# Patient Record
Sex: MALE | Race: ASIAN | NOT HISPANIC OR LATINO | Employment: UNEMPLOYED | URBAN - METROPOLITAN AREA
[De-identification: names, ages, dates, MRNs, and addresses within clinical notes are randomized per-mention and may not be internally consistent; named-entity substitution may affect disease eponyms.]

---

## 2017-03-25 ENCOUNTER — COMMUNICATION - HEALTHEAST (OUTPATIENT)
Dept: FAMILY MEDICINE | Facility: CLINIC | Age: 32
End: 2017-03-25

## 2017-03-25 DIAGNOSIS — E78.5 HYPERLIPIDEMIA: ICD-10-CM

## 2017-03-25 DIAGNOSIS — I10 ESSENTIAL HYPERTENSION WITH GOAL BLOOD PRESSURE LESS THAN 140/90: ICD-10-CM

## 2017-03-25 DIAGNOSIS — Z79.4 TYPE 2 DIABETES MELLITUS WITH HYPERGLYCEMIA, WITH LONG-TERM CURRENT USE OF INSULIN (H): ICD-10-CM

## 2017-03-25 DIAGNOSIS — G47.00 INSOMNIA: ICD-10-CM

## 2017-03-25 DIAGNOSIS — I63.9 CEREBROVASCULAR ACCIDENT (CVA), UNSPECIFIED MECHANISM (H): ICD-10-CM

## 2017-03-25 DIAGNOSIS — M54.16 LUMBAR RADICULOPATHY: ICD-10-CM

## 2017-03-25 DIAGNOSIS — E11.65 TYPE 2 DIABETES MELLITUS WITH HYPERGLYCEMIA, WITH LONG-TERM CURRENT USE OF INSULIN (H): ICD-10-CM

## 2017-03-25 DIAGNOSIS — F33.2 SEVERE EPISODE OF RECURRENT MAJOR DEPRESSIVE DISORDER (H): ICD-10-CM

## 2017-06-19 ENCOUNTER — OFFICE VISIT - HEALTHEAST (OUTPATIENT)
Dept: NURSING | Facility: CLINIC | Age: 32
End: 2017-06-19

## 2017-06-19 ENCOUNTER — OFFICE VISIT - HEALTHEAST (OUTPATIENT)
Dept: FAMILY MEDICINE | Facility: CLINIC | Age: 32
End: 2017-06-19

## 2017-06-19 ENCOUNTER — AMBULATORY - HEALTHEAST (OUTPATIENT)
Dept: FAMILY MEDICINE | Facility: CLINIC | Age: 32
End: 2017-06-19

## 2017-06-19 DIAGNOSIS — E11.65 TYPE 2 DIABETES MELLITUS WITH HYPERGLYCEMIA, WITH LONG-TERM CURRENT USE OF INSULIN (H): ICD-10-CM

## 2017-06-19 DIAGNOSIS — E78.5 HYPERLIPIDEMIA, UNSPECIFIED HYPERLIPIDEMIA TYPE: ICD-10-CM

## 2017-06-19 DIAGNOSIS — G47.00 INSOMNIA, UNSPECIFIED TYPE: ICD-10-CM

## 2017-06-19 DIAGNOSIS — R80.9 TYPE 2 DIABETES MELLITUS WITH MICROALBUMINURIA, WITH LONG-TERM CURRENT USE OF INSULIN (H): ICD-10-CM

## 2017-06-19 DIAGNOSIS — I10 ESSENTIAL HYPERTENSION WITH GOAL BLOOD PRESSURE LESS THAN 140/90: ICD-10-CM

## 2017-06-19 DIAGNOSIS — R80.9 TYPE 2 DIABETES MELLITUS WITH MICROALBUMINURIA (H): ICD-10-CM

## 2017-06-19 DIAGNOSIS — I67.89 ACUTE, BUT ILL-DEFINED, CEREBROVASCULAR DISEASE: ICD-10-CM

## 2017-06-19 DIAGNOSIS — R80.9 PROTEINURIA: ICD-10-CM

## 2017-06-19 DIAGNOSIS — E55.9 VITAMIN D DEFICIENCY: ICD-10-CM

## 2017-06-19 DIAGNOSIS — E11.29 TYPE 2 DIABETES MELLITUS WITH MICROALBUMINURIA, WITH LONG-TERM CURRENT USE OF INSULIN (H): ICD-10-CM

## 2017-06-19 DIAGNOSIS — F33.2 SEVERE EPISODE OF RECURRENT MAJOR DEPRESSIVE DISORDER, WITHOUT PSYCHOTIC FEATURES (H): ICD-10-CM

## 2017-06-19 DIAGNOSIS — E78.5 HYPERLIPIDEMIA: ICD-10-CM

## 2017-06-19 DIAGNOSIS — Z79.4 TYPE 2 DIABETES MELLITUS WITH HYPERGLYCEMIA, WITH LONG-TERM CURRENT USE OF INSULIN (H): ICD-10-CM

## 2017-06-19 DIAGNOSIS — I61.9 STROKE DUE TO INTRACEREBRAL HEMORRHAGE (H): ICD-10-CM

## 2017-06-19 DIAGNOSIS — E78.1 HYPERTRIGLYCERIDEMIA: ICD-10-CM

## 2017-06-19 DIAGNOSIS — I63.9 CEREBROVASCULAR ACCIDENT (CVA), UNSPECIFIED MECHANISM (H): ICD-10-CM

## 2017-06-19 DIAGNOSIS — F33.2 SEVERE EPISODE OF RECURRENT MAJOR DEPRESSIVE DISORDER (H): ICD-10-CM

## 2017-06-19 DIAGNOSIS — Z79.4 TYPE 2 DIABETES MELLITUS WITH MICROALBUMINURIA, WITH LONG-TERM CURRENT USE OF INSULIN (H): ICD-10-CM

## 2017-06-19 DIAGNOSIS — E11.29 TYPE 2 DIABETES MELLITUS WITH MICROALBUMINURIA (H): ICD-10-CM

## 2017-06-19 DIAGNOSIS — B36.0 TINEA VERSICOLOR: ICD-10-CM

## 2017-06-19 DIAGNOSIS — E11.9 DIABETES (H): ICD-10-CM

## 2017-06-19 DIAGNOSIS — G81.90 HEMIPLEGIA (H): ICD-10-CM

## 2017-06-19 DIAGNOSIS — M54.16 LUMBAR RADICULOPATHY: ICD-10-CM

## 2017-06-19 LAB
CHOLEST SERPL-MCNC: 240 MG/DL
FASTING STATUS PATIENT QL REPORTED: NO
HBA1C MFR BLD: 10 % (ref 3.5–6)
HDLC SERPL-MCNC: 36 MG/DL
LDLC SERPL CALC-MCNC: 127 MG/DL
LDLC SERPL CALC-MCNC: ABNORMAL MG/DL
TRIGL SERPL-MCNC: 506 MG/DL

## 2017-06-19 ASSESSMENT — MIFFLIN-ST. JEOR: SCORE: 1495.08

## 2017-06-20 ENCOUNTER — COMMUNICATION - HEALTHEAST (OUTPATIENT)
Dept: FAMILY MEDICINE | Facility: CLINIC | Age: 32
End: 2017-06-20

## 2017-06-21 ENCOUNTER — COMMUNICATION - HEALTHEAST (OUTPATIENT)
Dept: FAMILY MEDICINE | Facility: CLINIC | Age: 32
End: 2017-06-21

## 2017-07-12 ENCOUNTER — OFFICE VISIT - HEALTHEAST (OUTPATIENT)
Dept: NURSING | Facility: CLINIC | Age: 32
End: 2017-07-12

## 2017-07-12 DIAGNOSIS — E78.5 HYPERLIPIDEMIA, UNSPECIFIED HYPERLIPIDEMIA TYPE: ICD-10-CM

## 2017-07-12 DIAGNOSIS — Z79.4 TYPE 2 DIABETES MELLITUS WITH HYPERGLYCEMIA, WITH LONG-TERM CURRENT USE OF INSULIN (H): ICD-10-CM

## 2017-07-12 DIAGNOSIS — I67.89 ACUTE, BUT ILL-DEFINED, CEREBROVASCULAR DISEASE: ICD-10-CM

## 2017-07-12 DIAGNOSIS — M54.2 NECK PAIN: ICD-10-CM

## 2017-07-12 DIAGNOSIS — F33.2 SEVERE EPISODE OF RECURRENT MAJOR DEPRESSIVE DISORDER, WITHOUT PSYCHOTIC FEATURES (H): ICD-10-CM

## 2017-07-12 DIAGNOSIS — E11.65 TYPE 2 DIABETES MELLITUS WITH HYPERGLYCEMIA, WITH LONG-TERM CURRENT USE OF INSULIN (H): ICD-10-CM

## 2017-07-26 ENCOUNTER — COMMUNICATION - HEALTHEAST (OUTPATIENT)
Dept: FAMILY MEDICINE | Facility: CLINIC | Age: 32
End: 2017-07-26

## 2017-07-26 ENCOUNTER — COMMUNICATION - HEALTHEAST (OUTPATIENT)
Dept: NURSING | Facility: CLINIC | Age: 32
End: 2017-07-26

## 2017-07-26 DIAGNOSIS — I10 ESSENTIAL HYPERTENSION WITH GOAL BLOOD PRESSURE LESS THAN 140/90: ICD-10-CM

## 2017-07-26 DIAGNOSIS — M54.16 LUMBAR RADICULOPATHY: ICD-10-CM

## 2017-07-26 DIAGNOSIS — I63.9 CEREBROVASCULAR ACCIDENT (CVA), UNSPECIFIED MECHANISM (H): ICD-10-CM

## 2017-07-31 ENCOUNTER — OFFICE VISIT - HEALTHEAST (OUTPATIENT)
Dept: NURSING | Facility: CLINIC | Age: 32
End: 2017-07-31

## 2017-07-31 DIAGNOSIS — F33.2 SEVERE EPISODE OF RECURRENT MAJOR DEPRESSIVE DISORDER, WITHOUT PSYCHOTIC FEATURES (H): ICD-10-CM

## 2017-07-31 DIAGNOSIS — E78.5 HYPERLIPIDEMIA, UNSPECIFIED HYPERLIPIDEMIA TYPE: ICD-10-CM

## 2017-07-31 DIAGNOSIS — F33.2 SEVERE EPISODE OF RECURRENT MAJOR DEPRESSIVE DISORDER (H): ICD-10-CM

## 2017-07-31 DIAGNOSIS — E11.65 TYPE 2 DIABETES MELLITUS WITH HYPERGLYCEMIA, WITH LONG-TERM CURRENT USE OF INSULIN (H): ICD-10-CM

## 2017-07-31 DIAGNOSIS — E55.9 VITAMIN D DEFICIENCY: ICD-10-CM

## 2017-07-31 DIAGNOSIS — E11.29 TYPE 2 DIABETES MELLITUS WITH MICROALBUMINURIA, WITH LONG-TERM CURRENT USE OF INSULIN (H): ICD-10-CM

## 2017-07-31 DIAGNOSIS — R80.9 TYPE 2 DIABETES MELLITUS WITH MICROALBUMINURIA, WITH LONG-TERM CURRENT USE OF INSULIN (H): ICD-10-CM

## 2017-07-31 DIAGNOSIS — Z79.4 TYPE 2 DIABETES MELLITUS WITH MICROALBUMINURIA, WITH LONG-TERM CURRENT USE OF INSULIN (H): ICD-10-CM

## 2017-07-31 DIAGNOSIS — Z79.4 TYPE 2 DIABETES MELLITUS WITH HYPERGLYCEMIA, WITH LONG-TERM CURRENT USE OF INSULIN (H): ICD-10-CM

## 2017-07-31 DIAGNOSIS — M54.16 LUMBAR RADICULOPATHY: ICD-10-CM

## 2017-08-09 ENCOUNTER — COMMUNICATION - HEALTHEAST (OUTPATIENT)
Dept: FAMILY MEDICINE | Facility: CLINIC | Age: 32
End: 2017-08-09

## 2017-08-11 ENCOUNTER — COMMUNICATION - HEALTHEAST (OUTPATIENT)
Dept: FAMILY MEDICINE | Facility: CLINIC | Age: 32
End: 2017-08-11

## 2017-08-13 ENCOUNTER — RECORDS - HEALTHEAST (OUTPATIENT)
Dept: ADMINISTRATIVE | Facility: OTHER | Age: 32
End: 2017-08-13

## 2017-08-28 ENCOUNTER — OFFICE VISIT - HEALTHEAST (OUTPATIENT)
Dept: NURSING | Facility: CLINIC | Age: 32
End: 2017-08-28

## 2017-08-28 DIAGNOSIS — G51.0 RIGHT-SIDED BELL'S PALSY: ICD-10-CM

## 2017-08-28 DIAGNOSIS — R80.9 TYPE 2 DIABETES MELLITUS WITH MICROALBUMINURIA, WITH LONG-TERM CURRENT USE OF INSULIN (H): ICD-10-CM

## 2017-08-28 DIAGNOSIS — Z79.4 TYPE 2 DIABETES MELLITUS WITH HYPERGLYCEMIA, WITH LONG-TERM CURRENT USE OF INSULIN (H): ICD-10-CM

## 2017-08-28 DIAGNOSIS — E11.29 TYPE 2 DIABETES MELLITUS WITH MICROALBUMINURIA (H): ICD-10-CM

## 2017-08-28 DIAGNOSIS — E11.29 TYPE 2 DIABETES MELLITUS WITH MICROALBUMINURIA, WITH LONG-TERM CURRENT USE OF INSULIN (H): ICD-10-CM

## 2017-08-28 DIAGNOSIS — E11.9 DIABETES (H): ICD-10-CM

## 2017-08-28 DIAGNOSIS — E11.65 TYPE 2 DIABETES MELLITUS WITH HYPERGLYCEMIA, WITH LONG-TERM CURRENT USE OF INSULIN (H): ICD-10-CM

## 2017-08-28 DIAGNOSIS — Z79.4 TYPE 2 DIABETES MELLITUS WITH MICROALBUMINURIA, WITH LONG-TERM CURRENT USE OF INSULIN (H): ICD-10-CM

## 2017-08-28 DIAGNOSIS — G51.0 BELL'S PALSY: ICD-10-CM

## 2017-08-28 DIAGNOSIS — R80.9 TYPE 2 DIABETES MELLITUS WITH MICROALBUMINURIA (H): ICD-10-CM

## 2017-08-30 ENCOUNTER — COMMUNICATION - HEALTHEAST (OUTPATIENT)
Dept: FAMILY MEDICINE | Facility: CLINIC | Age: 32
End: 2017-08-30

## 2017-08-31 ENCOUNTER — COMMUNICATION - HEALTHEAST (OUTPATIENT)
Dept: FAMILY MEDICINE | Facility: CLINIC | Age: 32
End: 2017-08-31

## 2017-08-31 ENCOUNTER — OFFICE VISIT - HEALTHEAST (OUTPATIENT)
Dept: FAMILY MEDICINE | Facility: CLINIC | Age: 32
End: 2017-08-31

## 2017-08-31 DIAGNOSIS — G51.0 RIGHT-SIDED BELL'S PALSY: ICD-10-CM

## 2017-08-31 ASSESSMENT — MIFFLIN-ST. JEOR: SCORE: 1502.79

## 2018-01-22 ENCOUNTER — OFFICE VISIT - HEALTHEAST (OUTPATIENT)
Dept: FAMILY MEDICINE | Facility: CLINIC | Age: 33
End: 2018-01-22

## 2018-01-22 ENCOUNTER — AMBULATORY - HEALTHEAST (OUTPATIENT)
Dept: FAMILY MEDICINE | Facility: CLINIC | Age: 33
End: 2018-01-22

## 2018-01-22 DIAGNOSIS — E55.9 VITAMIN D DEFICIENCY: ICD-10-CM

## 2018-01-22 DIAGNOSIS — Z79.4 TYPE 2 DIABETES MELLITUS WITH HYPERGLYCEMIA, WITH LONG-TERM CURRENT USE OF INSULIN (H): ICD-10-CM

## 2018-01-22 DIAGNOSIS — E11.65 TYPE 2 DIABETES MELLITUS WITH HYPERGLYCEMIA, WITH LONG-TERM CURRENT USE OF INSULIN (H): ICD-10-CM

## 2018-01-22 DIAGNOSIS — I65.21 STENOSIS OF RIGHT CAROTID ARTERY: ICD-10-CM

## 2018-01-22 DIAGNOSIS — I10 ESSENTIAL HYPERTENSION WITH GOAL BLOOD PRESSURE LESS THAN 140/90: ICD-10-CM

## 2018-01-22 DIAGNOSIS — E78.1 HYPERTRIGLYCERIDEMIA: ICD-10-CM

## 2018-01-22 DIAGNOSIS — E66.3 OVERWEIGHT: ICD-10-CM

## 2018-01-22 DIAGNOSIS — E78.5 HYPERLIPIDEMIA, UNSPECIFIED HYPERLIPIDEMIA TYPE: ICD-10-CM

## 2018-01-22 DIAGNOSIS — Z91.199 NON COMPLIANCE WITH MEDICAL TREATMENT: ICD-10-CM

## 2018-01-22 DIAGNOSIS — F33.2 SEVERE EPISODE OF RECURRENT MAJOR DEPRESSIVE DISORDER, WITHOUT PSYCHOTIC FEATURES (H): ICD-10-CM

## 2018-01-22 LAB
ALBUMIN UR-MCNC: NEGATIVE MG/DL
APPEARANCE UR: CLEAR
BASOPHILS # BLD AUTO: 0 THOU/UL (ref 0–0.2)
BASOPHILS NFR BLD AUTO: 1 % (ref 0–2)
BILIRUB UR QL STRIP: NEGATIVE
COLOR UR AUTO: YELLOW
EOSINOPHIL # BLD AUTO: 0.2 THOU/UL (ref 0–0.4)
EOSINOPHIL NFR BLD AUTO: 2 % (ref 0–6)
ERYTHROCYTE [DISTWIDTH] IN BLOOD BY AUTOMATED COUNT: 11.3 % (ref 11–14.5)
GLUCOSE UR STRIP-MCNC: ABNORMAL MG/DL
HBA1C MFR BLD: 9.2 % (ref 3.5–6)
HCT VFR BLD AUTO: 42.4 % (ref 40–54)
HGB BLD-MCNC: 14.4 G/DL (ref 14–18)
HGB UR QL STRIP: NEGATIVE
KETONES UR STRIP-MCNC: NEGATIVE MG/DL
LEUKOCYTE ESTERASE UR QL STRIP: NEGATIVE
LYMPHOCYTES # BLD AUTO: 2.2 THOU/UL (ref 0.8–4.4)
LYMPHOCYTES NFR BLD AUTO: 31 % (ref 20–40)
MCH RBC QN AUTO: 30.3 PG (ref 27–34)
MCHC RBC AUTO-ENTMCNC: 33.9 G/DL (ref 32–36)
MCV RBC AUTO: 89 FL (ref 80–100)
MONOCYTES # BLD AUTO: 0.4 THOU/UL (ref 0–0.9)
MONOCYTES NFR BLD AUTO: 6 % (ref 2–10)
NEUTROPHILS # BLD AUTO: 4.2 THOU/UL (ref 2–7.7)
NEUTROPHILS NFR BLD AUTO: 60 % (ref 50–70)
NITRATE UR QL: NEGATIVE
PH UR STRIP: 7 [PH] (ref 5–8)
PLATELET # BLD AUTO: 274 THOU/UL (ref 140–440)
PMV BLD AUTO: 6.8 FL (ref 7–10)
RBC # BLD AUTO: 4.74 MILL/UL (ref 4.4–6.2)
SP GR UR STRIP: 1.01 (ref 1–1.03)
UROBILINOGEN UR STRIP-ACNC: ABNORMAL
WBC: 7 THOU/UL (ref 4–11)

## 2018-01-22 ASSESSMENT — MIFFLIN-ST. JEOR: SCORE: 1484.65

## 2018-01-23 LAB
ALBUMIN SERPL-MCNC: 3.8 G/DL (ref 3.5–5)
ALP SERPL-CCNC: 72 U/L (ref 45–120)
ALT SERPL W P-5'-P-CCNC: 18 U/L (ref 0–45)
ANION GAP SERPL CALCULATED.3IONS-SCNC: 9 MMOL/L (ref 5–18)
AST SERPL W P-5'-P-CCNC: 10 U/L (ref 0–40)
BILIRUB SERPL-MCNC: 0.4 MG/DL (ref 0–1)
BUN SERPL-MCNC: 10 MG/DL (ref 8–22)
CALCIUM SERPL-MCNC: 9.3 MG/DL (ref 8.5–10.5)
CHLORIDE BLD-SCNC: 104 MMOL/L (ref 98–107)
CHOLEST SERPL-MCNC: 223 MG/DL
CO2 SERPL-SCNC: 30 MMOL/L (ref 22–31)
CREAT SERPL-MCNC: 0.89 MG/DL (ref 0.7–1.3)
CREAT UR-MCNC: 73.2 MG/DL
FASTING STATUS PATIENT QL REPORTED: NO
GFR SERPL CREATININE-BSD FRML MDRD: >60 ML/MIN/1.73M2
GLUCOSE BLD-MCNC: 292 MG/DL (ref 70–125)
HDLC SERPL-MCNC: 37 MG/DL
LDLC SERPL CALC-MCNC: 119 MG/DL
MICROALBUMIN UR-MCNC: 3.33 MG/DL (ref 0–1.99)
MICROALBUMIN/CREAT UR: 45.5 MG/G
POTASSIUM BLD-SCNC: 4 MMOL/L (ref 3.5–5)
PROT SERPL-MCNC: 7 G/DL (ref 6–8)
SODIUM SERPL-SCNC: 143 MMOL/L (ref 136–145)
TRIGL SERPL-MCNC: 336 MG/DL
TSH SERPL DL<=0.005 MIU/L-ACNC: 0.82 UIU/ML (ref 0.3–5)

## 2018-01-24 ENCOUNTER — COMMUNICATION - HEALTHEAST (OUTPATIENT)
Dept: FAMILY MEDICINE | Facility: CLINIC | Age: 33
End: 2018-01-24

## 2018-01-24 LAB — 25(OH)D3 SERPL-MCNC: 7.7 NG/ML (ref 30–80)

## 2018-01-25 ENCOUNTER — COMMUNICATION - HEALTHEAST (OUTPATIENT)
Dept: FAMILY MEDICINE | Facility: CLINIC | Age: 33
End: 2018-01-25

## 2018-02-08 ENCOUNTER — AMBULATORY - HEALTHEAST (OUTPATIENT)
Dept: NURSING | Facility: CLINIC | Age: 33
End: 2018-02-08

## 2018-02-08 DIAGNOSIS — Z71.89 COMPLEX CARE COORDINATION: ICD-10-CM

## 2018-02-12 ENCOUNTER — OFFICE VISIT - HEALTHEAST (OUTPATIENT)
Dept: NURSING | Facility: CLINIC | Age: 33
End: 2018-02-12

## 2018-02-12 ENCOUNTER — COMMUNICATION - HEALTHEAST (OUTPATIENT)
Dept: NURSING | Facility: CLINIC | Age: 33
End: 2018-02-12

## 2018-02-12 DIAGNOSIS — E55.9 VITAMIN D DEFICIENCY: ICD-10-CM

## 2018-02-12 DIAGNOSIS — Z79.4 TYPE 2 DIABETES MELLITUS WITH HYPERGLYCEMIA, WITH LONG-TERM CURRENT USE OF INSULIN (H): ICD-10-CM

## 2018-02-12 DIAGNOSIS — E11.65 TYPE 2 DIABETES MELLITUS WITH HYPERGLYCEMIA, WITH LONG-TERM CURRENT USE OF INSULIN (H): ICD-10-CM

## 2018-02-12 DIAGNOSIS — Z86.19 HISTORY OF HEPATITIS B: ICD-10-CM

## 2018-02-14 ENCOUNTER — RECORDS - HEALTHEAST (OUTPATIENT)
Dept: ADMINISTRATIVE | Facility: OTHER | Age: 33
End: 2018-02-14

## 2018-02-16 ENCOUNTER — RECORDS - HEALTHEAST (OUTPATIENT)
Dept: ADMINISTRATIVE | Facility: OTHER | Age: 33
End: 2018-02-16

## 2018-02-19 ENCOUNTER — COMMUNICATION - HEALTHEAST (OUTPATIENT)
Dept: NURSING | Facility: CLINIC | Age: 33
End: 2018-02-19

## 2018-03-02 ENCOUNTER — RECORDS - HEALTHEAST (OUTPATIENT)
Dept: ADMINISTRATIVE | Facility: OTHER | Age: 33
End: 2018-03-02

## 2018-03-13 ENCOUNTER — OFFICE VISIT - HEALTHEAST (OUTPATIENT)
Dept: NURSING | Facility: CLINIC | Age: 33
End: 2018-03-13

## 2018-03-13 DIAGNOSIS — E11.65 TYPE 2 DIABETES MELLITUS WITH HYPERGLYCEMIA, WITH LONG-TERM CURRENT USE OF INSULIN (H): ICD-10-CM

## 2018-03-13 DIAGNOSIS — Z79.4 TYPE 2 DIABETES MELLITUS WITH HYPERGLYCEMIA, WITH LONG-TERM CURRENT USE OF INSULIN (H): ICD-10-CM

## 2018-03-13 DIAGNOSIS — E55.9 VITAMIN D DEFICIENCY: ICD-10-CM

## 2018-04-24 ENCOUNTER — OFFICE VISIT - HEALTHEAST (OUTPATIENT)
Dept: PHARMACY | Facility: CLINIC | Age: 33
End: 2018-04-24

## 2018-04-24 ENCOUNTER — AMBULATORY - HEALTHEAST (OUTPATIENT)
Dept: LAB | Facility: CLINIC | Age: 33
End: 2018-04-24

## 2018-04-24 ENCOUNTER — AMBULATORY - HEALTHEAST (OUTPATIENT)
Dept: PHARMACY | Facility: CLINIC | Age: 33
End: 2018-04-24

## 2018-04-24 DIAGNOSIS — E11.65 TYPE 2 DIABETES MELLITUS WITH HYPERGLYCEMIA, WITH LONG-TERM CURRENT USE OF INSULIN (H): ICD-10-CM

## 2018-04-24 DIAGNOSIS — E78.1 HYPERTRIGLYCERIDEMIA: ICD-10-CM

## 2018-04-24 DIAGNOSIS — Z79.4 TYPE 2 DIABETES MELLITUS WITH HYPERGLYCEMIA, WITH LONG-TERM CURRENT USE OF INSULIN (H): ICD-10-CM

## 2018-04-24 DIAGNOSIS — E55.9 VITAMIN D DEFICIENCY DISEASE: ICD-10-CM

## 2018-04-24 DIAGNOSIS — F33.2 SEVERE EPISODE OF RECURRENT MAJOR DEPRESSIVE DISORDER, WITHOUT PSYCHOTIC FEATURES (H): ICD-10-CM

## 2018-04-24 DIAGNOSIS — E78.2 MIXED HYPERLIPIDEMIA: ICD-10-CM

## 2018-04-24 DIAGNOSIS — B19.10 HEPATITIS B: ICD-10-CM

## 2018-04-24 DIAGNOSIS — I61.9 STROKE DUE TO INTRACEREBRAL HEMORRHAGE (H): ICD-10-CM

## 2018-04-24 DIAGNOSIS — I10 ESSENTIAL HYPERTENSION WITH GOAL BLOOD PRESSURE LESS THAN 140/90: ICD-10-CM

## 2018-04-24 DIAGNOSIS — M54.16 LUMBAR RADICULOPATHY: ICD-10-CM

## 2018-04-24 LAB
ALBUMIN SERPL-MCNC: 4 G/DL (ref 3.5–5)
ALP SERPL-CCNC: 84 U/L (ref 45–120)
ALT SERPL W P-5'-P-CCNC: 20 U/L (ref 0–45)
ANION GAP SERPL CALCULATED.3IONS-SCNC: 12 MMOL/L (ref 5–18)
AST SERPL W P-5'-P-CCNC: 13 U/L (ref 0–40)
BILIRUB SERPL-MCNC: 1.1 MG/DL (ref 0–1)
BUN SERPL-MCNC: 12 MG/DL (ref 8–22)
CALCIUM SERPL-MCNC: 9.9 MG/DL (ref 8.5–10.5)
CHLORIDE BLD-SCNC: 103 MMOL/L (ref 98–107)
CHOLEST SERPL-MCNC: 261 MG/DL
CO2 SERPL-SCNC: 25 MMOL/L (ref 22–31)
CREAT SERPL-MCNC: 0.85 MG/DL (ref 0.7–1.3)
CREAT UR-MCNC: 378.4 MG/DL
FASTING STATUS PATIENT QL REPORTED: NO
GFR SERPL CREATININE-BSD FRML MDRD: >60 ML/MIN/1.73M2
GLUCOSE BLD-MCNC: 205 MG/DL (ref 70–125)
HBA1C MFR BLD: 10 % (ref 3.5–6)
HDLC SERPL-MCNC: 37 MG/DL
LDLC SERPL CALC-MCNC: 146 MG/DL
MICROALBUMIN UR-MCNC: 37.8 MG/DL (ref 0–1.99)
MICROALBUMIN/CREAT UR: 99.9 MG/G
POTASSIUM BLD-SCNC: 4.6 MMOL/L (ref 3.5–5)
PROT SERPL-MCNC: 7.6 G/DL (ref 6–8)
SODIUM SERPL-SCNC: 140 MMOL/L (ref 136–145)
TRIGL SERPL-MCNC: 389 MG/DL

## 2018-04-25 LAB
25(OH)D3 SERPL-MCNC: 17 NG/ML (ref 30–80)
HBV SURFACE AG SERPL QL IA: NEGATIVE
HEPATITIS B SURFACE ANTIBODY LHE- HISTORICAL: POSITIVE

## 2018-05-30 ENCOUNTER — OFFICE VISIT - HEALTHEAST (OUTPATIENT)
Dept: PHARMACY | Facility: CLINIC | Age: 33
End: 2018-05-30

## 2018-05-30 DIAGNOSIS — M54.16 LUMBAR RADICULOPATHY: ICD-10-CM

## 2018-05-30 DIAGNOSIS — Z79.4 TYPE 2 DIABETES MELLITUS WITH HYPERGLYCEMIA, WITH LONG-TERM CURRENT USE OF INSULIN (H): ICD-10-CM

## 2018-05-30 DIAGNOSIS — E55.9 VITAMIN D DEFICIENCY: ICD-10-CM

## 2018-05-30 DIAGNOSIS — E11.65 TYPE 2 DIABETES MELLITUS WITH HYPERGLYCEMIA, WITH LONG-TERM CURRENT USE OF INSULIN (H): ICD-10-CM

## 2018-05-30 DIAGNOSIS — I61.9 STROKE DUE TO INTRACEREBRAL HEMORRHAGE (H): ICD-10-CM

## 2018-05-30 DIAGNOSIS — F33.2 SEVERE EPISODE OF RECURRENT MAJOR DEPRESSIVE DISORDER, WITHOUT PSYCHOTIC FEATURES (H): ICD-10-CM

## 2018-05-30 DIAGNOSIS — B36.0 TINEA VERSICOLOR: ICD-10-CM

## 2018-06-27 ENCOUNTER — OFFICE VISIT - HEALTHEAST (OUTPATIENT)
Dept: FAMILY MEDICINE | Facility: CLINIC | Age: 33
End: 2018-06-27

## 2018-06-27 DIAGNOSIS — G81.90 HEMIPLEGIA (H): ICD-10-CM

## 2018-06-27 DIAGNOSIS — E11.65 TYPE 2 DIABETES MELLITUS WITH HYPERGLYCEMIA, WITH LONG-TERM CURRENT USE OF INSULIN (H): ICD-10-CM

## 2018-06-27 DIAGNOSIS — E78.5 HYPERLIPIDEMIA, UNSPECIFIED HYPERLIPIDEMIA TYPE: ICD-10-CM

## 2018-06-27 DIAGNOSIS — I10 ESSENTIAL HYPERTENSION WITH GOAL BLOOD PRESSURE LESS THAN 140/90: ICD-10-CM

## 2018-06-27 DIAGNOSIS — Z79.4 TYPE 2 DIABETES MELLITUS WITH HYPERGLYCEMIA, WITH LONG-TERM CURRENT USE OF INSULIN (H): ICD-10-CM

## 2018-06-27 ASSESSMENT — MIFFLIN-ST. JEOR: SCORE: 1466.5

## 2018-06-28 ENCOUNTER — COMMUNICATION - HEALTHEAST (OUTPATIENT)
Dept: FAMILY MEDICINE | Facility: CLINIC | Age: 33
End: 2018-06-28

## 2018-07-02 ENCOUNTER — OFFICE VISIT - HEALTHEAST (OUTPATIENT)
Dept: PHARMACY | Facility: CLINIC | Age: 33
End: 2018-07-02

## 2018-07-02 DIAGNOSIS — E11.65 TYPE 2 DIABETES MELLITUS WITH HYPERGLYCEMIA, WITH LONG-TERM CURRENT USE OF INSULIN (H): ICD-10-CM

## 2018-07-02 DIAGNOSIS — F33.2 SEVERE EPISODE OF RECURRENT MAJOR DEPRESSIVE DISORDER, WITHOUT PSYCHOTIC FEATURES (H): ICD-10-CM

## 2018-07-02 DIAGNOSIS — Z79.4 TYPE 2 DIABETES MELLITUS WITH HYPERGLYCEMIA, WITH LONG-TERM CURRENT USE OF INSULIN (H): ICD-10-CM

## 2018-08-02 ENCOUNTER — AMBULATORY - HEALTHEAST (OUTPATIENT)
Dept: PHARMACY | Facility: CLINIC | Age: 33
End: 2018-08-02

## 2018-08-02 DIAGNOSIS — Z79.4 TYPE 2 DIABETES MELLITUS WITH HYPERGLYCEMIA, WITH LONG-TERM CURRENT USE OF INSULIN (H): ICD-10-CM

## 2018-08-02 DIAGNOSIS — E11.65 TYPE 2 DIABETES MELLITUS WITH HYPERGLYCEMIA, WITH LONG-TERM CURRENT USE OF INSULIN (H): ICD-10-CM

## 2018-08-02 DIAGNOSIS — E55.9 VITAMIN D DEFICIENCY DISEASE: ICD-10-CM

## 2018-08-03 ENCOUNTER — AMBULATORY - HEALTHEAST (OUTPATIENT)
Dept: LAB | Facility: CLINIC | Age: 33
End: 2018-08-03

## 2018-08-03 ENCOUNTER — OFFICE VISIT - HEALTHEAST (OUTPATIENT)
Dept: PHARMACY | Facility: CLINIC | Age: 33
End: 2018-08-03

## 2018-08-03 DIAGNOSIS — Z79.4 TYPE 2 DIABETES MELLITUS WITH HYPERGLYCEMIA, WITH LONG-TERM CURRENT USE OF INSULIN (H): ICD-10-CM

## 2018-08-03 DIAGNOSIS — E55.9 VITAMIN D DEFICIENCY DISEASE: ICD-10-CM

## 2018-08-03 DIAGNOSIS — E11.65 TYPE 2 DIABETES MELLITUS WITH HYPERGLYCEMIA, WITH LONG-TERM CURRENT USE OF INSULIN (H): ICD-10-CM

## 2018-08-03 DIAGNOSIS — E78.1 HYPERTRIGLYCERIDEMIA: ICD-10-CM

## 2018-08-03 DIAGNOSIS — I10 ESSENTIAL HYPERTENSION WITH GOAL BLOOD PRESSURE LESS THAN 140/90: ICD-10-CM

## 2018-08-03 DIAGNOSIS — F33.2 SEVERE EPISODE OF RECURRENT MAJOR DEPRESSIVE DISORDER, WITHOUT PSYCHOTIC FEATURES (H): ICD-10-CM

## 2018-08-03 LAB
ALBUMIN SERPL-MCNC: 4.6 G/DL (ref 3.5–5)
ALP SERPL-CCNC: 86 U/L (ref 45–120)
ALT SERPL W P-5'-P-CCNC: 30 U/L (ref 0–45)
ANION GAP SERPL CALCULATED.3IONS-SCNC: 12 MMOL/L (ref 5–18)
AST SERPL W P-5'-P-CCNC: 15 U/L (ref 0–40)
BILIRUB SERPL-MCNC: 1.2 MG/DL (ref 0–1)
BUN SERPL-MCNC: 11 MG/DL (ref 8–22)
CALCIUM SERPL-MCNC: 10.3 MG/DL (ref 8.5–10.5)
CHLORIDE BLD-SCNC: 100 MMOL/L (ref 98–107)
CHOLEST SERPL-MCNC: 269 MG/DL
CO2 SERPL-SCNC: 27 MMOL/L (ref 22–31)
CREAT SERPL-MCNC: 0.83 MG/DL (ref 0.7–1.3)
CREAT UR-MCNC: 108 MG/DL
FASTING STATUS PATIENT QL REPORTED: NO
GFR SERPL CREATININE-BSD FRML MDRD: >60 ML/MIN/1.73M2
GLUCOSE BLD-MCNC: 152 MG/DL (ref 70–125)
HBA1C MFR BLD: 8 % (ref 3.5–6)
HDLC SERPL-MCNC: 42 MG/DL
LDLC SERPL CALC-MCNC: 155 MG/DL
LDLC SERPL CALC-MCNC: ABNORMAL MG/DL
MICROALBUMIN UR-MCNC: 79.94 MG/DL (ref 0–1.99)
MICROALBUMIN/CREAT UR: 740.2 MG/G
POTASSIUM BLD-SCNC: 4.7 MMOL/L (ref 3.5–5)
PROT SERPL-MCNC: 8.3 G/DL (ref 6–8)
SODIUM SERPL-SCNC: 139 MMOL/L (ref 136–145)
TRIGL SERPL-MCNC: 428 MG/DL

## 2018-08-06 LAB — 25(OH)D3 SERPL-MCNC: 21.2 NG/ML (ref 30–80)

## 2018-08-07 ENCOUNTER — AMBULATORY - HEALTHEAST (OUTPATIENT)
Dept: PHARMACY | Facility: CLINIC | Age: 33
End: 2018-08-07

## 2018-08-07 DIAGNOSIS — E55.9 VITAMIN D DEFICIENCY: ICD-10-CM

## 2018-09-15 ENCOUNTER — COMMUNICATION - HEALTHEAST (OUTPATIENT)
Dept: NURSING | Facility: CLINIC | Age: 33
End: 2018-09-15

## 2018-09-15 DIAGNOSIS — I10 ESSENTIAL HYPERTENSION WITH GOAL BLOOD PRESSURE LESS THAN 140/90: ICD-10-CM

## 2018-09-21 ENCOUNTER — OFFICE VISIT - HEALTHEAST (OUTPATIENT)
Dept: PHARMACY | Facility: CLINIC | Age: 33
End: 2018-09-21

## 2018-09-21 DIAGNOSIS — E55.9 VITAMIN D DEFICIENCY: ICD-10-CM

## 2018-09-21 DIAGNOSIS — E78.1 HYPERTRIGLYCERIDEMIA: ICD-10-CM

## 2018-09-21 DIAGNOSIS — E11.65 TYPE 2 DIABETES MELLITUS WITH HYPERGLYCEMIA, WITH LONG-TERM CURRENT USE OF INSULIN (H): ICD-10-CM

## 2018-09-21 DIAGNOSIS — I10 ESSENTIAL HYPERTENSION WITH GOAL BLOOD PRESSURE LESS THAN 140/90: ICD-10-CM

## 2018-09-21 DIAGNOSIS — F33.2 SEVERE EPISODE OF RECURRENT MAJOR DEPRESSIVE DISORDER, WITHOUT PSYCHOTIC FEATURES (H): ICD-10-CM

## 2018-09-21 DIAGNOSIS — Z79.4 TYPE 2 DIABETES MELLITUS WITH HYPERGLYCEMIA, WITH LONG-TERM CURRENT USE OF INSULIN (H): ICD-10-CM

## 2018-10-19 ENCOUNTER — OFFICE VISIT - HEALTHEAST (OUTPATIENT)
Dept: PHARMACY | Facility: CLINIC | Age: 33
End: 2018-10-19

## 2018-10-19 ENCOUNTER — AMBULATORY - HEALTHEAST (OUTPATIENT)
Dept: LAB | Facility: CLINIC | Age: 33
End: 2018-10-19

## 2018-10-19 DIAGNOSIS — Z79.4 TYPE 2 DIABETES MELLITUS WITH HYPERGLYCEMIA, WITH LONG-TERM CURRENT USE OF INSULIN (H): ICD-10-CM

## 2018-10-19 DIAGNOSIS — E11.65 TYPE 2 DIABETES MELLITUS WITH HYPERGLYCEMIA, WITH LONG-TERM CURRENT USE OF INSULIN (H): ICD-10-CM

## 2018-10-19 DIAGNOSIS — E55.9 VITAMIN D DEFICIENCY: ICD-10-CM

## 2018-10-19 DIAGNOSIS — E78.2 MIXED HYPERLIPIDEMIA: ICD-10-CM

## 2018-10-19 LAB
ANION GAP SERPL CALCULATED.3IONS-SCNC: 13 MMOL/L (ref 5–18)
BUN SERPL-MCNC: 13 MG/DL (ref 8–22)
CALCIUM SERPL-MCNC: 10.4 MG/DL (ref 8.5–10.5)
CHLORIDE BLD-SCNC: 102 MMOL/L (ref 98–107)
CO2 SERPL-SCNC: 27 MMOL/L (ref 22–31)
CREAT SERPL-MCNC: 0.91 MG/DL (ref 0.7–1.3)
GFR SERPL CREATININE-BSD FRML MDRD: >60 ML/MIN/1.73M2
GLUCOSE BLD-MCNC: 163 MG/DL (ref 70–125)
POTASSIUM BLD-SCNC: 4.4 MMOL/L (ref 3.5–5)
SODIUM SERPL-SCNC: 142 MMOL/L (ref 136–145)

## 2018-11-04 ENCOUNTER — COMMUNICATION - HEALTHEAST (OUTPATIENT)
Dept: FAMILY MEDICINE | Facility: CLINIC | Age: 33
End: 2018-11-04

## 2018-11-04 DIAGNOSIS — E11.65 TYPE 2 DIABETES MELLITUS WITH HYPERGLYCEMIA, WITH LONG-TERM CURRENT USE OF INSULIN (H): ICD-10-CM

## 2018-11-04 DIAGNOSIS — Z79.4 TYPE 2 DIABETES MELLITUS WITH HYPERGLYCEMIA, WITH LONG-TERM CURRENT USE OF INSULIN (H): ICD-10-CM

## 2018-11-27 ENCOUNTER — AMBULATORY - HEALTHEAST (OUTPATIENT)
Dept: PHARMACY | Facility: CLINIC | Age: 33
End: 2018-11-27

## 2018-11-27 DIAGNOSIS — E55.9 VITAMIN D DEFICIENCY DISEASE: ICD-10-CM

## 2018-11-27 DIAGNOSIS — Z79.4 TYPE 2 DIABETES MELLITUS WITH HYPERGLYCEMIA, WITH LONG-TERM CURRENT USE OF INSULIN (H): ICD-10-CM

## 2018-11-27 DIAGNOSIS — E78.1 HYPERTRIGLYCERIDEMIA: ICD-10-CM

## 2018-11-27 DIAGNOSIS — E11.65 TYPE 2 DIABETES MELLITUS WITH HYPERGLYCEMIA, WITH LONG-TERM CURRENT USE OF INSULIN (H): ICD-10-CM

## 2018-11-28 ENCOUNTER — AMBULATORY - HEALTHEAST (OUTPATIENT)
Dept: LAB | Facility: CLINIC | Age: 33
End: 2018-11-28

## 2018-11-28 ENCOUNTER — RECORDS - HEALTHEAST (OUTPATIENT)
Dept: ADMINISTRATIVE | Facility: OTHER | Age: 33
End: 2018-11-28

## 2018-11-28 ENCOUNTER — COMMUNICATION - HEALTHEAST (OUTPATIENT)
Dept: PHARMACY | Facility: CLINIC | Age: 33
End: 2018-11-28

## 2018-11-28 ENCOUNTER — OFFICE VISIT - HEALTHEAST (OUTPATIENT)
Dept: PHARMACY | Facility: CLINIC | Age: 33
End: 2018-11-28

## 2018-11-28 DIAGNOSIS — M54.16 LUMBAR RADICULOPATHY: ICD-10-CM

## 2018-11-28 DIAGNOSIS — E11.9 DIABETES (H): ICD-10-CM

## 2018-11-28 DIAGNOSIS — E11.65 TYPE 2 DIABETES MELLITUS WITH HYPERGLYCEMIA, WITH LONG-TERM CURRENT USE OF INSULIN (H): ICD-10-CM

## 2018-11-28 DIAGNOSIS — E55.9 VITAMIN D DEFICIENCY DISEASE: ICD-10-CM

## 2018-11-28 DIAGNOSIS — F33.2 SEVERE EPISODE OF RECURRENT MAJOR DEPRESSIVE DISORDER, WITHOUT PSYCHOTIC FEATURES (H): ICD-10-CM

## 2018-11-28 DIAGNOSIS — Z79.4 TYPE 2 DIABETES MELLITUS WITH HYPERGLYCEMIA, WITH LONG-TERM CURRENT USE OF INSULIN (H): ICD-10-CM

## 2018-11-28 DIAGNOSIS — I10 ESSENTIAL HYPERTENSION WITH GOAL BLOOD PRESSURE LESS THAN 140/90: ICD-10-CM

## 2018-11-28 LAB
ALBUMIN SERPL-MCNC: 4.5 G/DL (ref 3.5–5)
ALP SERPL-CCNC: 61 U/L (ref 45–120)
ALT SERPL W P-5'-P-CCNC: 27 U/L (ref 0–45)
ANION GAP SERPL CALCULATED.3IONS-SCNC: 9 MMOL/L (ref 5–18)
AST SERPL W P-5'-P-CCNC: 19 U/L (ref 0–40)
BILIRUB SERPL-MCNC: 0.5 MG/DL (ref 0–1)
BUN SERPL-MCNC: 18 MG/DL (ref 8–22)
CALCIUM SERPL-MCNC: 10.6 MG/DL (ref 8.5–10.5)
CHLORIDE BLD-SCNC: 102 MMOL/L (ref 98–107)
CO2 SERPL-SCNC: 30 MMOL/L (ref 22–31)
CREAT SERPL-MCNC: 0.91 MG/DL (ref 0.7–1.3)
GFR SERPL CREATININE-BSD FRML MDRD: >60 ML/MIN/1.73M2
GLUCOSE BLD-MCNC: 153 MG/DL (ref 70–125)
HBA1C MFR BLD: 9.1 % (ref 3.5–6)
POTASSIUM BLD-SCNC: 4.6 MMOL/L (ref 3.5–5)
PROT SERPL-MCNC: 7.9 G/DL (ref 6–8)
SODIUM SERPL-SCNC: 141 MMOL/L (ref 136–145)

## 2018-11-29 ENCOUNTER — COMMUNICATION - HEALTHEAST (OUTPATIENT)
Dept: FAMILY MEDICINE | Facility: CLINIC | Age: 33
End: 2018-11-29

## 2018-11-29 DIAGNOSIS — E55.9 VITAMIN D DEFICIENCY: ICD-10-CM

## 2018-11-29 LAB — 25(OH)D3 SERPL-MCNC: 19.4 NG/ML (ref 30–80)

## 2018-12-28 ENCOUNTER — OFFICE VISIT - HEALTHEAST (OUTPATIENT)
Dept: PHARMACY | Facility: CLINIC | Age: 33
End: 2018-12-28

## 2018-12-28 DIAGNOSIS — Z79.4 TYPE 2 DIABETES MELLITUS WITH HYPERGLYCEMIA, WITH LONG-TERM CURRENT USE OF INSULIN (H): ICD-10-CM

## 2018-12-28 DIAGNOSIS — E55.9 VITAMIN D DEFICIENCY: ICD-10-CM

## 2018-12-28 DIAGNOSIS — F33.2 SEVERE EPISODE OF RECURRENT MAJOR DEPRESSIVE DISORDER, WITHOUT PSYCHOTIC FEATURES (H): ICD-10-CM

## 2018-12-28 DIAGNOSIS — E78.2 MIXED HYPERLIPIDEMIA: ICD-10-CM

## 2018-12-28 DIAGNOSIS — E11.65 TYPE 2 DIABETES MELLITUS WITH HYPERGLYCEMIA, WITH LONG-TERM CURRENT USE OF INSULIN (H): ICD-10-CM

## 2018-12-28 DIAGNOSIS — M54.16 LUMBAR RADICULOPATHY: ICD-10-CM

## 2019-01-30 ENCOUNTER — COMMUNICATION - HEALTHEAST (OUTPATIENT)
Dept: FAMILY MEDICINE | Facility: CLINIC | Age: 34
End: 2019-01-30

## 2019-01-30 DIAGNOSIS — E78.1 HYPERTRIGLYCERIDEMIA: ICD-10-CM

## 2019-03-04 ENCOUNTER — AMBULATORY - HEALTHEAST (OUTPATIENT)
Dept: PHARMACY | Facility: CLINIC | Age: 34
End: 2019-03-04

## 2019-03-04 DIAGNOSIS — E55.9 VITAMIN D DEFICIENCY: ICD-10-CM

## 2019-03-04 DIAGNOSIS — Z79.4 TYPE 2 DIABETES MELLITUS WITH HYPERGLYCEMIA, WITH LONG-TERM CURRENT USE OF INSULIN (H): ICD-10-CM

## 2019-03-04 DIAGNOSIS — E11.65 TYPE 2 DIABETES MELLITUS WITH HYPERGLYCEMIA, WITH LONG-TERM CURRENT USE OF INSULIN (H): ICD-10-CM

## 2019-03-05 ENCOUNTER — AMBULATORY - HEALTHEAST (OUTPATIENT)
Dept: LAB | Facility: CLINIC | Age: 34
End: 2019-03-05

## 2019-03-05 ENCOUNTER — OFFICE VISIT - HEALTHEAST (OUTPATIENT)
Dept: PHARMACY | Facility: CLINIC | Age: 34
End: 2019-03-05

## 2019-03-05 DIAGNOSIS — E11.65 TYPE 2 DIABETES MELLITUS WITH HYPERGLYCEMIA, WITH LONG-TERM CURRENT USE OF INSULIN (H): ICD-10-CM

## 2019-03-05 DIAGNOSIS — E55.9 VITAMIN D DEFICIENCY: ICD-10-CM

## 2019-03-05 DIAGNOSIS — Z79.4 TYPE 2 DIABETES MELLITUS WITH HYPERGLYCEMIA, WITH LONG-TERM CURRENT USE OF INSULIN (H): ICD-10-CM

## 2019-03-05 DIAGNOSIS — M54.16 LUMBAR RADICULOPATHY: ICD-10-CM

## 2019-03-05 DIAGNOSIS — Z86.73 HISTORY OF ISCHEMIC STROKE WITHOUT RESIDUAL DEFICITS: ICD-10-CM

## 2019-03-05 DIAGNOSIS — F33.2 SEVERE EPISODE OF RECURRENT MAJOR DEPRESSIVE DISORDER, WITHOUT PSYCHOTIC FEATURES (H): ICD-10-CM

## 2019-03-05 DIAGNOSIS — E78.1 HYPERTRIGLYCERIDEMIA: ICD-10-CM

## 2019-03-05 LAB — HBA1C MFR BLD: 6.9 % (ref 3.5–6)

## 2019-03-06 ENCOUNTER — COMMUNICATION - HEALTHEAST (OUTPATIENT)
Dept: FAMILY MEDICINE | Facility: CLINIC | Age: 34
End: 2019-03-06

## 2019-03-06 LAB — 25(OH)D3 SERPL-MCNC: 33.9 NG/ML (ref 30–80)

## 2019-03-08 ENCOUNTER — COMMUNICATION - HEALTHEAST (OUTPATIENT)
Dept: FAMILY MEDICINE | Facility: CLINIC | Age: 34
End: 2019-03-08

## 2019-04-02 ENCOUNTER — COMMUNICATION - HEALTHEAST (OUTPATIENT)
Dept: FAMILY MEDICINE | Facility: CLINIC | Age: 34
End: 2019-04-02

## 2019-04-02 DIAGNOSIS — Z79.4 TYPE 2 DIABETES MELLITUS WITH HYPERGLYCEMIA, WITH LONG-TERM CURRENT USE OF INSULIN (H): ICD-10-CM

## 2019-04-02 DIAGNOSIS — E11.65 TYPE 2 DIABETES MELLITUS WITH HYPERGLYCEMIA, WITH LONG-TERM CURRENT USE OF INSULIN (H): ICD-10-CM

## 2019-06-03 ENCOUNTER — RECORDS - HEALTHEAST (OUTPATIENT)
Dept: HEALTH INFORMATION MANAGEMENT | Facility: CLINIC | Age: 34
End: 2019-06-03

## 2019-06-12 ENCOUNTER — COMMUNICATION - HEALTHEAST (OUTPATIENT)
Dept: FAMILY MEDICINE | Facility: CLINIC | Age: 34
End: 2019-06-12

## 2019-07-18 ENCOUNTER — COMMUNICATION - HEALTHEAST (OUTPATIENT)
Dept: FAMILY MEDICINE | Facility: CLINIC | Age: 34
End: 2019-07-18

## 2019-07-18 DIAGNOSIS — E11.65 TYPE 2 DIABETES MELLITUS WITH HYPERGLYCEMIA, WITH LONG-TERM CURRENT USE OF INSULIN (H): ICD-10-CM

## 2019-07-18 DIAGNOSIS — E78.1 HYPERTRIGLYCERIDEMIA: ICD-10-CM

## 2019-07-18 DIAGNOSIS — Z79.4 TYPE 2 DIABETES MELLITUS WITH HYPERGLYCEMIA, WITH LONG-TERM CURRENT USE OF INSULIN (H): ICD-10-CM

## 2019-07-29 ENCOUNTER — AMBULATORY - HEALTHEAST (OUTPATIENT)
Dept: FAMILY MEDICINE | Facility: CLINIC | Age: 34
End: 2019-07-29

## 2019-07-29 ENCOUNTER — OFFICE VISIT - HEALTHEAST (OUTPATIENT)
Dept: FAMILY MEDICINE | Facility: CLINIC | Age: 34
End: 2019-07-29

## 2019-07-29 ENCOUNTER — HOSPITAL ENCOUNTER (OUTPATIENT)
Dept: LAB | Age: 34
Setting detail: SPECIMEN
Discharge: HOME | End: 2019-07-29

## 2019-07-29 DIAGNOSIS — Z79.4 TYPE 2 DIABETES MELLITUS WITH HYPERGLYCEMIA, WITH LONG-TERM CURRENT USE OF INSULIN (H): ICD-10-CM

## 2019-07-29 DIAGNOSIS — M54.16 LUMBAR RADICULOPATHY: ICD-10-CM

## 2019-07-29 DIAGNOSIS — E11.65 TYPE 2 DIABETES MELLITUS WITH HYPERGLYCEMIA, WITH LONG-TERM CURRENT USE OF INSULIN (H): ICD-10-CM

## 2019-07-29 DIAGNOSIS — I10 ESSENTIAL HYPERTENSION WITH GOAL BLOOD PRESSURE LESS THAN 140/90: ICD-10-CM

## 2019-07-29 DIAGNOSIS — B36.0 TINEA VERSICOLOR: ICD-10-CM

## 2019-07-29 DIAGNOSIS — I69.352: ICD-10-CM

## 2019-07-29 DIAGNOSIS — I61.9 STROKE DUE TO INTRACEREBRAL HEMORRHAGE (H): ICD-10-CM

## 2019-07-29 DIAGNOSIS — E78.1 HYPERTRIGLYCERIDEMIA: ICD-10-CM

## 2019-07-29 DIAGNOSIS — E78.5 HYPERLIPIDEMIA, UNSPECIFIED HYPERLIPIDEMIA TYPE: ICD-10-CM

## 2019-07-29 DIAGNOSIS — E55.9 VITAMIN D DEFICIENCY: ICD-10-CM

## 2019-07-29 LAB
ALBUMIN SERPL-MCNC: 4.3 G/DL (ref 3.5–5)
ALP SERPL-CCNC: 65 U/L (ref 45–120)
ALT SERPL W P-5'-P-CCNC: 29 U/L (ref 0–45)
ANION GAP SERPL CALCULATED.3IONS-SCNC: 9 MMOL/L (ref 5–18)
AST SERPL W P-5'-P-CCNC: 17 U/L (ref 0–40)
BILIRUB SERPL-MCNC: 0.7 MG/DL (ref 0–1)
BUN SERPL-MCNC: 14 MG/DL (ref 8–22)
CALCIUM SERPL-MCNC: 10 MG/DL (ref 8.5–10.5)
CHLORIDE BLD-SCNC: 104 MMOL/L (ref 98–107)
CHOLEST SERPL-MCNC: 263 MG/DL
CO2 SERPL-SCNC: 27 MMOL/L (ref 22–31)
CREAT SERPL-MCNC: 0.84 MG/DL (ref 0.7–1.3)
FASTING STATUS PATIENT QL REPORTED: ABNORMAL
GFR SERPL CREATININE-BSD FRML MDRD: >60 ML/MIN/1.73M2
GLUCOSE BLD-MCNC: 205 MG/DL (ref 70–125)
HBA1C MFR BLD: 8.9 % (ref 3.5–6)
HDLC SERPL-MCNC: 36 MG/DL
LDLC SERPL CALC-MCNC: 142 MG/DL
LDLC SERPL CALC-MCNC: ABNORMAL MG/DL
POTASSIUM BLD-SCNC: 4.1 MMOL/L (ref 3.5–5)
PROT SERPL-MCNC: 7.3 G/DL (ref 6–8)
SODIUM SERPL-SCNC: 140 MMOL/L (ref 136–145)
TRIGL SERPL-MCNC: 461 MG/DL

## 2019-07-29 ASSESSMENT — MIFFLIN-ST. JEOR: SCORE: 1516.4

## 2019-08-05 ENCOUNTER — COMMUNICATION - HEALTHEAST (OUTPATIENT)
Dept: FAMILY MEDICINE | Facility: CLINIC | Age: 34
End: 2019-08-05

## 2019-08-29 ENCOUNTER — COMMUNICATION - HEALTHEAST (OUTPATIENT)
Dept: FAMILY MEDICINE | Facility: CLINIC | Age: 34
End: 2019-08-29

## 2019-08-29 DIAGNOSIS — E11.65 TYPE 2 DIABETES MELLITUS WITH HYPERGLYCEMIA, WITH LONG-TERM CURRENT USE OF INSULIN (H): ICD-10-CM

## 2019-08-29 DIAGNOSIS — Z79.4 TYPE 2 DIABETES MELLITUS WITH HYPERGLYCEMIA, WITH LONG-TERM CURRENT USE OF INSULIN (H): ICD-10-CM

## 2019-10-04 ENCOUNTER — OFFICE VISIT - HEALTHEAST (OUTPATIENT)
Dept: PHARMACY | Facility: CLINIC | Age: 34
End: 2019-10-04

## 2019-10-04 DIAGNOSIS — Z86.73 HISTORY OF ISCHEMIC STROKE WITHOUT RESIDUAL DEFICITS: ICD-10-CM

## 2019-10-04 DIAGNOSIS — E55.9 VITAMIN D DEFICIENCY DISEASE: ICD-10-CM

## 2019-10-04 DIAGNOSIS — I61.9 STROKE DUE TO INTRACEREBRAL HEMORRHAGE (H): ICD-10-CM

## 2019-10-04 DIAGNOSIS — E11.65 TYPE 2 DIABETES MELLITUS WITH HYPERGLYCEMIA, WITH LONG-TERM CURRENT USE OF INSULIN (H): ICD-10-CM

## 2019-10-04 DIAGNOSIS — I10 ESSENTIAL HYPERTENSION WITH GOAL BLOOD PRESSURE LESS THAN 140/90: ICD-10-CM

## 2019-10-04 DIAGNOSIS — E55.9 VITAMIN D DEFICIENCY: ICD-10-CM

## 2019-10-04 DIAGNOSIS — F33.2 SEVERE EPISODE OF RECURRENT MAJOR DEPRESSIVE DISORDER, WITHOUT PSYCHOTIC FEATURES (H): ICD-10-CM

## 2019-10-04 DIAGNOSIS — G47.00 INSOMNIA, UNSPECIFIED TYPE: ICD-10-CM

## 2019-10-04 DIAGNOSIS — Z79.4 TYPE 2 DIABETES MELLITUS WITH HYPERGLYCEMIA, WITH LONG-TERM CURRENT USE OF INSULIN (H): ICD-10-CM

## 2019-10-04 DIAGNOSIS — E78.1 HYPERTRIGLYCERIDEMIA: ICD-10-CM

## 2019-10-31 ENCOUNTER — AMBULATORY - HEALTHEAST (OUTPATIENT)
Dept: PHARMACY | Facility: CLINIC | Age: 34
End: 2019-10-31

## 2019-10-31 DIAGNOSIS — E11.65 TYPE 2 DIABETES MELLITUS WITH HYPERGLYCEMIA, WITH LONG-TERM CURRENT USE OF INSULIN (H): ICD-10-CM

## 2019-10-31 DIAGNOSIS — Z79.4 TYPE 2 DIABETES MELLITUS WITH HYPERGLYCEMIA, WITH LONG-TERM CURRENT USE OF INSULIN (H): ICD-10-CM

## 2019-11-01 ENCOUNTER — OFFICE VISIT - HEALTHEAST (OUTPATIENT)
Dept: PHARMACY | Facility: CLINIC | Age: 34
End: 2019-11-01

## 2019-11-01 ENCOUNTER — AMBULATORY - HEALTHEAST (OUTPATIENT)
Dept: LAB | Facility: CLINIC | Age: 34
End: 2019-11-01

## 2019-11-01 DIAGNOSIS — E78.1 HYPERTRIGLYCERIDEMIA: ICD-10-CM

## 2019-11-01 DIAGNOSIS — Z00.00 HEALTHCARE MAINTENANCE: ICD-10-CM

## 2019-11-01 DIAGNOSIS — E11.65 TYPE 2 DIABETES MELLITUS WITH HYPERGLYCEMIA, WITH LONG-TERM CURRENT USE OF INSULIN (H): ICD-10-CM

## 2019-11-01 DIAGNOSIS — E55.9 VITAMIN D DEFICIENCY: ICD-10-CM

## 2019-11-01 DIAGNOSIS — Z00.00 ROUTINE GENERAL MEDICAL EXAMINATION AT A HEALTH CARE FACILITY: ICD-10-CM

## 2019-11-01 DIAGNOSIS — G47.00 INSOMNIA, UNSPECIFIED TYPE: ICD-10-CM

## 2019-11-01 DIAGNOSIS — I10 ESSENTIAL HYPERTENSION WITH GOAL BLOOD PRESSURE LESS THAN 140/90: ICD-10-CM

## 2019-11-01 DIAGNOSIS — Z79.4 TYPE 2 DIABETES MELLITUS WITH HYPERGLYCEMIA, WITH LONG-TERM CURRENT USE OF INSULIN (H): ICD-10-CM

## 2019-11-01 DIAGNOSIS — F33.2 SEVERE EPISODE OF RECURRENT MAJOR DEPRESSIVE DISORDER, WITHOUT PSYCHOTIC FEATURES (H): ICD-10-CM

## 2019-11-01 DIAGNOSIS — I61.9 STROKE DUE TO INTRACEREBRAL HEMORRHAGE (H): ICD-10-CM

## 2019-11-01 LAB
CREAT UR-MCNC: 61.5 MG/DL
HBA1C MFR BLD: 8.9 % (ref 3.5–6)
MICROALBUMIN UR-MCNC: 13.82 MG/DL (ref 0–1.99)
MICROALBUMIN/CREAT UR: 224.7 MG/G

## 2020-01-07 ENCOUNTER — COMMUNICATION - HEALTHEAST (OUTPATIENT)
Dept: PHARMACY | Facility: CLINIC | Age: 35
End: 2020-01-07

## 2020-01-07 DIAGNOSIS — Z79.4 TYPE 2 DIABETES MELLITUS WITH HYPERGLYCEMIA, WITH LONG-TERM CURRENT USE OF INSULIN (H): ICD-10-CM

## 2020-01-07 DIAGNOSIS — E11.65 TYPE 2 DIABETES MELLITUS WITH HYPERGLYCEMIA, WITH LONG-TERM CURRENT USE OF INSULIN (H): ICD-10-CM

## 2020-01-17 ENCOUNTER — COMMUNICATION - HEALTHEAST (OUTPATIENT)
Dept: FAMILY MEDICINE | Facility: CLINIC | Age: 35
End: 2020-01-17

## 2020-01-17 DIAGNOSIS — E11.65 TYPE 2 DIABETES MELLITUS WITH HYPERGLYCEMIA, WITH LONG-TERM CURRENT USE OF INSULIN (H): ICD-10-CM

## 2020-01-17 DIAGNOSIS — Z79.4 TYPE 2 DIABETES MELLITUS WITH HYPERGLYCEMIA, WITH LONG-TERM CURRENT USE OF INSULIN (H): ICD-10-CM

## 2020-02-07 ENCOUNTER — AMBULATORY - HEALTHEAST (OUTPATIENT)
Dept: PHARMACY | Facility: CLINIC | Age: 35
End: 2020-02-07

## 2020-02-07 ENCOUNTER — OFFICE VISIT - HEALTHEAST (OUTPATIENT)
Dept: PHARMACY | Facility: CLINIC | Age: 35
End: 2020-02-07

## 2020-02-07 ENCOUNTER — AMBULATORY - HEALTHEAST (OUTPATIENT)
Dept: LAB | Facility: CLINIC | Age: 35
End: 2020-02-07

## 2020-02-07 DIAGNOSIS — E11.65 TYPE 2 DIABETES MELLITUS WITH HYPERGLYCEMIA, WITH LONG-TERM CURRENT USE OF INSULIN (H): ICD-10-CM

## 2020-02-07 DIAGNOSIS — F33.2 SEVERE EPISODE OF RECURRENT MAJOR DEPRESSIVE DISORDER, WITHOUT PSYCHOTIC FEATURES (H): ICD-10-CM

## 2020-02-07 DIAGNOSIS — I61.9 STROKE DUE TO INTRACEREBRAL HEMORRHAGE (H): ICD-10-CM

## 2020-02-07 DIAGNOSIS — Z79.4 TYPE 2 DIABETES MELLITUS WITH HYPERGLYCEMIA, WITH LONG-TERM CURRENT USE OF INSULIN (H): ICD-10-CM

## 2020-02-07 DIAGNOSIS — E78.1 HYPERTRIGLYCERIDEMIA: ICD-10-CM

## 2020-02-07 DIAGNOSIS — B36.0 TINEA VERSICOLOR: ICD-10-CM

## 2020-02-07 DIAGNOSIS — I10 ESSENTIAL HYPERTENSION WITH GOAL BLOOD PRESSURE LESS THAN 140/90: ICD-10-CM

## 2020-02-07 DIAGNOSIS — E55.9 VITAMIN D DEFICIENCY: ICD-10-CM

## 2020-02-07 LAB — HBA1C MFR BLD: 8.2 % (ref 3.5–6)

## 2020-02-07 RX ORDER — ERGOCALCIFEROL 1.25 MG/1
50000 CAPSULE ORAL WEEKLY
Qty: 12 CAPSULE | Refills: 0 | Status: SHIPPED | OUTPATIENT
Start: 2020-02-07 | End: 2024-08-29

## 2020-02-28 ENCOUNTER — COMMUNICATION - HEALTHEAST (OUTPATIENT)
Dept: FAMILY MEDICINE | Facility: CLINIC | Age: 35
End: 2020-02-28

## 2020-03-06 ENCOUNTER — COMMUNICATION - HEALTHEAST (OUTPATIENT)
Dept: FAMILY MEDICINE | Facility: CLINIC | Age: 35
End: 2020-03-06

## 2020-03-06 DIAGNOSIS — I61.9 STROKE DUE TO INTRACEREBRAL HEMORRHAGE (H): ICD-10-CM

## 2020-03-06 DIAGNOSIS — Z79.4 TYPE 2 DIABETES MELLITUS WITH HYPERGLYCEMIA, WITH LONG-TERM CURRENT USE OF INSULIN (H): ICD-10-CM

## 2020-03-06 DIAGNOSIS — E11.65 TYPE 2 DIABETES MELLITUS WITH HYPERGLYCEMIA, WITH LONG-TERM CURRENT USE OF INSULIN (H): ICD-10-CM

## 2020-06-04 ENCOUNTER — COMMUNICATION - HEALTHEAST (OUTPATIENT)
Dept: FAMILY MEDICINE | Facility: CLINIC | Age: 35
End: 2020-06-04

## 2020-06-04 DIAGNOSIS — E11.65 TYPE 2 DIABETES MELLITUS WITH HYPERGLYCEMIA, WITH LONG-TERM CURRENT USE OF INSULIN (H): ICD-10-CM

## 2020-06-04 DIAGNOSIS — Z79.4 TYPE 2 DIABETES MELLITUS WITH HYPERGLYCEMIA, WITH LONG-TERM CURRENT USE OF INSULIN (H): ICD-10-CM

## 2020-06-24 ENCOUNTER — COMMUNICATION - HEALTHEAST (OUTPATIENT)
Dept: FAMILY MEDICINE | Facility: CLINIC | Age: 35
End: 2020-06-24

## 2020-06-24 DIAGNOSIS — Z79.4 TYPE 2 DIABETES MELLITUS WITH HYPERGLYCEMIA, WITH LONG-TERM CURRENT USE OF INSULIN (H): ICD-10-CM

## 2020-06-24 DIAGNOSIS — F33.2 SEVERE EPISODE OF RECURRENT MAJOR DEPRESSIVE DISORDER, WITHOUT PSYCHOTIC FEATURES (H): ICD-10-CM

## 2020-06-24 DIAGNOSIS — I10 ESSENTIAL HYPERTENSION WITH GOAL BLOOD PRESSURE LESS THAN 140/90: ICD-10-CM

## 2020-06-24 DIAGNOSIS — E11.65 TYPE 2 DIABETES MELLITUS WITH HYPERGLYCEMIA, WITH LONG-TERM CURRENT USE OF INSULIN (H): ICD-10-CM

## 2020-07-10 ENCOUNTER — COMMUNICATION - HEALTHEAST (OUTPATIENT)
Dept: FAMILY MEDICINE | Facility: CLINIC | Age: 35
End: 2020-07-10

## 2020-09-23 ENCOUNTER — OFFICE VISIT - HEALTHEAST (OUTPATIENT)
Dept: FAMILY MEDICINE | Facility: CLINIC | Age: 35
End: 2020-09-23

## 2020-09-23 ENCOUNTER — COMMUNICATION - HEALTHEAST (OUTPATIENT)
Dept: FAMILY MEDICINE | Facility: CLINIC | Age: 35
End: 2020-09-23

## 2020-09-23 DIAGNOSIS — Z79.4 TYPE 2 DIABETES MELLITUS WITH HYPERGLYCEMIA, WITH LONG-TERM CURRENT USE OF INSULIN (H): ICD-10-CM

## 2020-09-23 DIAGNOSIS — E78.2 MIXED HYPERLIPIDEMIA: ICD-10-CM

## 2020-09-23 DIAGNOSIS — F33.2 SEVERE EPISODE OF RECURRENT MAJOR DEPRESSIVE DISORDER, WITHOUT PSYCHOTIC FEATURES (H): ICD-10-CM

## 2020-09-23 DIAGNOSIS — E11.65 TYPE 2 DIABETES MELLITUS WITH HYPERGLYCEMIA, WITH LONG-TERM CURRENT USE OF INSULIN (H): ICD-10-CM

## 2020-09-23 DIAGNOSIS — I10 ESSENTIAL HYPERTENSION WITH GOAL BLOOD PRESSURE LESS THAN 140/90: ICD-10-CM

## 2020-09-23 LAB
ALBUMIN SERPL-MCNC: 4.4 G/DL (ref 3.5–5)
ALP SERPL-CCNC: 69 U/L (ref 45–120)
ALT SERPL W P-5'-P-CCNC: 31 U/L (ref 0–45)
ANION GAP SERPL CALCULATED.3IONS-SCNC: 11 MMOL/L (ref 5–18)
AST SERPL W P-5'-P-CCNC: 14 U/L (ref 0–40)
BILIRUB SERPL-MCNC: 0.4 MG/DL (ref 0–1)
BUN SERPL-MCNC: 21 MG/DL (ref 8–22)
CALCIUM SERPL-MCNC: 10 MG/DL (ref 8.5–10.5)
CHLORIDE BLD-SCNC: 102 MMOL/L (ref 98–107)
CHOLEST SERPL-MCNC: 319 MG/DL
CO2 SERPL-SCNC: 26 MMOL/L (ref 22–31)
CREAT SERPL-MCNC: 1.15 MG/DL (ref 0.7–1.3)
CREAT UR-MCNC: 134.8 MG/DL
FASTING STATUS PATIENT QL REPORTED: NO
GFR SERPL CREATININE-BSD FRML MDRD: >60 ML/MIN/1.73M2
GLUCOSE BLD-MCNC: 268 MG/DL (ref 70–125)
HBA1C MFR BLD: 11 %
HDLC SERPL-MCNC: 39 MG/DL
LDLC SERPL CALC-MCNC: 142 MG/DL
LDLC SERPL CALC-MCNC: ABNORMAL MG/DL
MICROALBUMIN UR-MCNC: 35.24 MG/DL (ref 0–1.99)
MICROALBUMIN/CREAT UR: 261.4 MG/G
POTASSIUM BLD-SCNC: 4.2 MMOL/L (ref 3.5–5)
PROT SERPL-MCNC: 7.9 G/DL (ref 6–8)
SODIUM SERPL-SCNC: 139 MMOL/L (ref 136–145)
TRIGL SERPL-MCNC: 916 MG/DL

## 2020-09-23 RX ORDER — DULAGLUTIDE 1.5 MG/.5ML
1.5 INJECTION, SOLUTION SUBCUTANEOUS
Qty: 6 ML | Refills: 6 | Status: SHIPPED | OUTPATIENT
Start: 2020-09-23

## 2020-09-23 RX ORDER — AMMONIUM LACTATE 12 G/100G
LOTION TOPICAL
Status: SHIPPED | COMMUNITY
Start: 2020-02-14 | End: 2024-07-09

## 2020-09-23 RX ORDER — INSULIN GLARGINE 100 [IU]/ML
37 INJECTION, SOLUTION SUBCUTANEOUS DAILY
Qty: 45 ML | Refills: 3 | Status: SHIPPED | OUTPATIENT
Start: 2020-09-23 | End: 2024-08-29

## 2020-09-23 ASSESSMENT — PATIENT HEALTH QUESTIONNAIRE - PHQ9: SUM OF ALL RESPONSES TO PHQ QUESTIONS 1-9: 3

## 2020-09-25 ENCOUNTER — COMMUNICATION - HEALTHEAST (OUTPATIENT)
Dept: FAMILY MEDICINE | Facility: CLINIC | Age: 35
End: 2020-09-25

## 2020-10-26 ENCOUNTER — OFFICE VISIT - HEALTHEAST (OUTPATIENT)
Dept: FAMILY MEDICINE | Facility: CLINIC | Age: 35
End: 2020-10-26

## 2020-10-26 DIAGNOSIS — Z79.4 TYPE 2 DIABETES MELLITUS WITH HYPERGLYCEMIA, WITH LONG-TERM CURRENT USE OF INSULIN (H): ICD-10-CM

## 2020-10-26 DIAGNOSIS — E11.65 TYPE 2 DIABETES MELLITUS WITH HYPERGLYCEMIA, WITH LONG-TERM CURRENT USE OF INSULIN (H): ICD-10-CM

## 2020-10-26 DIAGNOSIS — B36.0 TINEA VERSICOLOR: ICD-10-CM

## 2020-10-26 RX ORDER — SELENIUM SULFIDE 2.5 MG/100ML
LOTION TOPICAL
Qty: 120 ML | Refills: 1 | Status: SHIPPED | OUTPATIENT
Start: 2020-10-26 | End: 2024-07-09

## 2020-10-26 ASSESSMENT — MIFFLIN-ST. JEOR: SCORE: 1512.99

## 2020-10-29 ENCOUNTER — COMMUNICATION - HEALTHEAST (OUTPATIENT)
Dept: PHARMACY | Facility: CLINIC | Age: 35
End: 2020-10-29

## 2020-10-29 DIAGNOSIS — Z79.4 TYPE 2 DIABETES MELLITUS WITH HYPERGLYCEMIA, WITH LONG-TERM CURRENT USE OF INSULIN (H): ICD-10-CM

## 2020-10-29 DIAGNOSIS — E11.65 TYPE 2 DIABETES MELLITUS WITH HYPERGLYCEMIA, WITH LONG-TERM CURRENT USE OF INSULIN (H): ICD-10-CM

## 2020-11-10 ENCOUNTER — OFFICE VISIT - HEALTHEAST (OUTPATIENT)
Dept: FAMILY MEDICINE | Facility: CLINIC | Age: 35
End: 2020-11-10

## 2020-11-10 DIAGNOSIS — Z79.4 TYPE 2 DIABETES MELLITUS WITH HYPERGLYCEMIA, WITH LONG-TERM CURRENT USE OF INSULIN (H): ICD-10-CM

## 2020-11-10 DIAGNOSIS — E11.65 TYPE 2 DIABETES MELLITUS WITH HYPERGLYCEMIA, WITH LONG-TERM CURRENT USE OF INSULIN (H): ICD-10-CM

## 2020-12-08 ENCOUNTER — OFFICE VISIT - HEALTHEAST (OUTPATIENT)
Dept: FAMILY MEDICINE | Facility: CLINIC | Age: 35
End: 2020-12-08

## 2020-12-08 DIAGNOSIS — Z79.4 TYPE 2 DIABETES MELLITUS WITH HYPERGLYCEMIA, WITH LONG-TERM CURRENT USE OF INSULIN (H): ICD-10-CM

## 2020-12-08 DIAGNOSIS — Z11.4 SCREENING FOR HIV WITHOUT PRESENCE OF RISK FACTORS: ICD-10-CM

## 2020-12-08 DIAGNOSIS — E11.65 TYPE 2 DIABETES MELLITUS WITH HYPERGLYCEMIA, WITH LONG-TERM CURRENT USE OF INSULIN (H): ICD-10-CM

## 2020-12-08 DIAGNOSIS — Z11.59 ENCOUNTER FOR HCV SCREENING TEST FOR LOW RISK PATIENT: ICD-10-CM

## 2020-12-08 LAB
HBA1C MFR BLD: 7.2 %
HIV 1+2 AB+HIV1 P24 AG SERPL QL IA: NEGATIVE

## 2020-12-08 ASSESSMENT — MIFFLIN-ST. JEOR: SCORE: 1502.79

## 2020-12-09 LAB — HCV AB SERPL QL IA: NEGATIVE

## 2021-04-12 ENCOUNTER — OFFICE VISIT - HEALTHEAST (OUTPATIENT)
Dept: FAMILY MEDICINE | Facility: CLINIC | Age: 36
End: 2021-04-12

## 2021-04-12 DIAGNOSIS — I10 ESSENTIAL HYPERTENSION WITH GOAL BLOOD PRESSURE LESS THAN 140/90: ICD-10-CM

## 2021-04-12 DIAGNOSIS — Z79.4 TYPE 2 DIABETES MELLITUS WITH HYPERGLYCEMIA, WITH LONG-TERM CURRENT USE OF INSULIN (H): ICD-10-CM

## 2021-04-12 DIAGNOSIS — S00.521D BLISTER OF LIP WITH INFECTION, SUBSEQUENT ENCOUNTER: ICD-10-CM

## 2021-04-12 DIAGNOSIS — E11.65 TYPE 2 DIABETES MELLITUS WITH HYPERGLYCEMIA, WITH LONG-TERM CURRENT USE OF INSULIN (H): ICD-10-CM

## 2021-04-12 DIAGNOSIS — L08.9 BLISTER OF LIP WITH INFECTION, SUBSEQUENT ENCOUNTER: ICD-10-CM

## 2021-04-12 DIAGNOSIS — I61.9 STROKE DUE TO INTRACEREBRAL HEMORRHAGE (H): ICD-10-CM

## 2021-04-12 LAB — HBA1C MFR BLD: 8.1 %

## 2021-04-12 RX ORDER — GLUCOSAMINE HCL/CHONDROITIN SU 500-400 MG
CAPSULE ORAL
Qty: 70 STRIP | Refills: 12 | Status: SHIPPED | OUTPATIENT
Start: 2021-04-12 | End: 2024-07-09

## 2021-04-12 RX ORDER — ATORVASTATIN CALCIUM 80 MG/1
TABLET, FILM COATED ORAL
Qty: 30 TABLET | Refills: 6 | Status: SHIPPED | OUTPATIENT
Start: 2021-04-12 | End: 2024-07-09

## 2021-04-12 RX ORDER — LISINOPRIL 20 MG/1
TABLET ORAL
Qty: 30 TABLET | Refills: 6 | Status: SHIPPED | OUTPATIENT
Start: 2021-04-12 | End: 2024-07-09

## 2021-04-12 ASSESSMENT — PATIENT HEALTH QUESTIONNAIRE - PHQ9: SUM OF ALL RESPONSES TO PHQ QUESTIONS 1-9: 3

## 2021-04-13 LAB
ALBUMIN SERPL-MCNC: 4.6 G/DL (ref 3.5–5)
ALP SERPL-CCNC: 68 U/L (ref 45–120)
ALT SERPL W P-5'-P-CCNC: 28 U/L (ref 0–45)
ANION GAP SERPL CALCULATED.3IONS-SCNC: 11 MMOL/L (ref 5–18)
AST SERPL W P-5'-P-CCNC: 15 U/L (ref 0–40)
BILIRUB SERPL-MCNC: 0.5 MG/DL (ref 0–1)
BUN SERPL-MCNC: 18 MG/DL (ref 8–22)
CALCIUM SERPL-MCNC: 9.7 MG/DL (ref 8.5–10.5)
CHLORIDE BLD-SCNC: 103 MMOL/L (ref 98–107)
CO2 SERPL-SCNC: 25 MMOL/L (ref 22–31)
CREAT SERPL-MCNC: 0.85 MG/DL (ref 0.7–1.3)
GFR SERPL CREATININE-BSD FRML MDRD: >60 ML/MIN/1.73M2
GLUCOSE BLD-MCNC: 168 MG/DL (ref 70–125)
POTASSIUM BLD-SCNC: 4 MMOL/L (ref 3.5–5)
PROT SERPL-MCNC: 8.4 G/DL (ref 6–8)
SODIUM SERPL-SCNC: 139 MMOL/L (ref 136–145)

## 2021-04-14 ENCOUNTER — COMMUNICATION - HEALTHEAST (OUTPATIENT)
Dept: FAMILY MEDICINE | Facility: CLINIC | Age: 36
End: 2021-04-14

## 2021-05-27 ASSESSMENT — PATIENT HEALTH QUESTIONNAIRE - PHQ9
SUM OF ALL RESPONSES TO PHQ QUESTIONS 1-9: 3
SUM OF ALL RESPONSES TO PHQ QUESTIONS 1-9: 3

## 2021-05-27 NOTE — TELEPHONE ENCOUNTER
RN cannot approve Refill Request    RN can NOT refill this medication Protocol failed and NO refill given.       Paula Chowdary, [facility] Triage/Med Refill 4/3/2019    Requested Prescriptions   Pending Prescriptions Disp Refills     TRULICITY 1.5 mg/0.5 mL PnIj [Pharmacy Med Name: TRULICITY 1.5 MG/0.5 ML PEN] 2 Syringe 2     Sig: INJECT 1.5 MG UNDER THE SKIN EVERY 7 DAYS.    Insulin/GLP-1 Refill Protocol Failed - 4/2/2019  9:12 AM       Failed - Visit with PCP or prescribing provider visit in last 6 months    Last office visit with prescriber/PCP: Visit date not found OR same dept: 6/27/2018 Lisseth Ho MD OR same specialty: 6/27/2018 Lisseth Ho MD Last physical: Visit date not found Last MTM visit: Visit date not found     Next appt within 3 mo: Visit date not found  Next physical within 3 mo: Visit date not found  Prescriber OR PCP: Lisseth Ho MD  Last diagnosis associated with med order: 1. Type 2 diabetes mellitus with hyperglycemia, with long-term current use of insulin (H)  - TRULICITY 1.5 mg/0.5 mL PnIj [Pharmacy Med Name: TRULICITY 1.5 MG/0.5 ML PEN]; INJECT 1.5 MG UNDER THE SKIN EVERY 7 DAYS.  Dispense: 2 Syringe; Refill: 2    If protocol passes may refill for 6 months if within 3 months of last provider visit (or a total of 9 months).             Passed - A1C in last 6 months    Hemoglobin A1c   Date Value Ref Range Status   03/05/2019 6.9 (H) 3.5 - 6.0 % Final              Passed - Microalbumin in last year    Microalbumin, Random Urine   Date Value Ref Range Status   08/03/2018 79.94 (H) 0.00 - 1.99 mg/dL Final                 Passed - Blood pressure in last year    BP Readings from Last 1 Encounters:   09/21/18 110/80            Passed - Creatinine done in last year    Creatinine   Date Value Ref Range Status   11/28/2018 0.91 0.70 - 1.30 mg/dL Final

## 2021-05-29 NOTE — TELEPHONE ENCOUNTER
Spoke w/patient through  Services, relayed message per Dr. Ho. Told patient form will be at  for signatures and to follow up with Dr. Ho for diabetic check. Patient verbalized understanding.

## 2021-05-29 NOTE — TELEPHONE ENCOUNTER
Patient dropped off form for Vic to complete. It is a  evaluation and he wants this form completed by tomorrow. Patient will pick it up when it is completed. You will need language line- please arabella him at [phone].

## 2021-05-30 NOTE — TELEPHONE ENCOUNTER
RN cannot approve Refill Request    RN can NOT refill this medication Protocol failed and NO refill given. Last office visit: 6/27/2018 Lisseth Ho MD Last Physical: Visit date not found Last MTM visit: Visit date not found Last visit same specialty: 6/27/2018 Lisseth Ho MD.  Next visit within 3 mo: Visit date not found  Next physical within 3 mo: Visit date not found      Trice Dale, [facility] Triage/Med Refill 7/18/2019    Requested Prescriptions   Pending Prescriptions Disp Refills     fenofibrate (TRIGLIDE) 160 MG tablet [Pharmacy Med Name: FENOFIBRATE 160 MG TABLET] 30 tablet 2     Sig: TAKE 1 TABLET BY MOUTH EVERY DAY       Fenofibrate Refill Protocol Failed - 7/18/2019  1:44 AM        Failed - Renal status in last 6 months     Creatinine   Date Value Ref Range Status   11/28/2018 0.91 0.70 - 1.30 mg/dL Final             Failed - PCP or prescribing provider visit in past 12 months       Last office visit with prescriber/PCP: 6/27/2018 Lisseth Ho MD OR same dept: Visit date not found OR same specialty: 6/27/2018 Lisseth Ho MD  Last physical: Visit date not found Last MTM visit: Visit date not found   Next visit within 3 mo: Visit date not found  Next physical within 3 mo: Visit date not found  Prescriber OR PCP: Lisseth Ho MD  Last diagnosis associated with med order: 1. Hypertriglyceridemia  - fenofibrate (TRIGLIDE) 160 MG tablet [Pharmacy Med Name: FENOFIBRATE 160 MG TABLET]; TAKE 1 TABLET BY MOUTH EVERY DAY  Dispense: 30 tablet; Refill: 2    2. Type 2 diabetes mellitus with hyperglycemia, with long-term current use of insulin (H)  - metFORMIN (GLUCOPHAGE-XR) 500 MG 24 hr tablet [Pharmacy Med Name: METFORMIN  MG TABLET]; Take 4 tablets (2,000 mg total) by mouth daily.  Dispense: 120 tablet; Refill: 2    If protocol passes may refill for 12 months if within 3 months of last provider visit (or a total of 15 months).              Passed - Fasting lipid cascade in last 12 months     Cholesterol   Date Value Ref Range Status   08/03/2018 269 (H) <=199 mg/dL Final     Triglycerides   Date Value Ref Range Status   08/03/2018 428 (H) <=149 mg/dL Final     HDL Cholesterol   Date Value Ref Range Status   08/03/2018 42 >=40 mg/dL Final     LDL Calculated   Date Value Ref Range Status   08/03/2018  <=129 mg/dL Final     Comment:     Invalid, Triglycerides >400     Patient Fasting > 8hrs?   Date Value Ref Range Status   08/03/2018 No  Final             Passed - AST or ALT in last 12 months     AST   Date Value Ref Range Status   11/28/2018 19 0 - 40 U/L Final     ALT   Date Value Ref Range Status   11/28/2018 27 0 - 45 U/L Final               metFORMIN (GLUCOPHAGE-XR) 500 MG 24 hr tablet [Pharmacy Med Name: METFORMIN  MG TABLET] 120 tablet 2     Sig: TAKE 4 TABLETS (2,000 MG TOTAL) BY MOUTH DAILY.       Metformin Refill Protocol Failed - 7/18/2019  1:44 AM        Failed - Visit with PCP or prescribing provider visit in last 6 months or next 3 months     Last office visit with prescriber/PCP: Visit date not found OR same dept: Visit date not found OR same specialty: 6/27/2018 Lisseth Ho MD Last physical: Visit date not found Last MT visit: Visit date not found         Next appt within 3 mo: Visit date not found  Next physical within 3 mo: Visit date not found  Prescriber OR PCP: Lisseth Ho MD  Last diagnosis associated with med order: 1. Hypertriglyceridemia  - fenofibrate (TRIGLIDE) 160 MG tablet [Pharmacy Med Name: FENOFIBRATE 160 MG TABLET]; TAKE 1 TABLET BY MOUTH EVERY DAY  Dispense: 30 tablet; Refill: 2    2. Type 2 diabetes mellitus with hyperglycemia, with long-term current use of insulin (H)  - metFORMIN (GLUCOPHAGE-XR) 500 MG 24 hr tablet [Pharmacy Med Name: METFORMIN  MG TABLET]; Take 4 tablets (2,000 mg total) by mouth daily.  Dispense: 120 tablet; Refill: 2     If protocol passes may  refill for 12 months if within 3 months of last provider visit (or a total of 15 months).           Passed - Blood pressure in last 12 months     BP Readings from Last 1 Encounters:   09/21/18 110/80             Passed - LFT or AST or ALT in last 12 months     Albumin   Date Value Ref Range Status   11/28/2018 4.5 3.5 - 5.0 g/dL Final     Bilirubin, Total   Date Value Ref Range Status   11/28/2018 0.5 0.0 - 1.0 mg/dL Final     Bilirubin, Direct   Date Value Ref Range Status   08/04/2016 0.2 <=0.5 mg/dL Final     Alkaline Phosphatase   Date Value Ref Range Status   11/28/2018 61 45 - 120 U/L Final     AST   Date Value Ref Range Status   11/28/2018 19 0 - 40 U/L Final     ALT   Date Value Ref Range Status   11/28/2018 27 0 - 45 U/L Final     Protein, Total   Date Value Ref Range Status   11/28/2018 7.9 6.0 - 8.0 g/dL Final                Passed - GFR or Serum Creatinine in last 6 months     GFR MDRD Non Af Amer   Date Value Ref Range Status   11/28/2018 >60 >60 mL/min/1.73m2 Final     GFR MDRD Af Amer   Date Value Ref Range Status   11/28/2018 >60 >60 mL/min/1.73m2 Final             Passed - A1C in last 6 months     Hemoglobin A1c   Date Value Ref Range Status   03/05/2019 6.9 (H) 3.5 - 6.0 % Final               Passed - Microalbumin in last year      Microalbumin, Random Urine   Date Value Ref Range Status   08/03/2018 79.94 (H) 0.00 - 1.99 mg/dL Final

## 2021-05-30 NOTE — PROGRESS NOTES
"OFFICE VISIT - FAMILY MEDICINE     ASSESSMENT AND PLAN     1. Type 2 diabetes mellitus with hyperglycemia, with long-term current use of insulin (H)  Glycosylated Hemoglobin A1c    Comprehensive Metabolic Panel    Lipid Cascade    aspirin 81 MG EC tablet    metFORMIN (GLUCOPHAGE-XR) 500 MG 24 hr tablet    dulaglutide (TRULICITY) 1.5 mg/0.5 mL PnIj    atorvastatin (LIPITOR) 80 MG tablet    blood glucose test strips    generic lancets (FINGERSTIX LANCETS)    pen needle, diabetic (BD ULTRA-FINE NEHAL PEN NEEDLE) 32 gauge x 5/32\" Ndle    Custom LDL Cholesterol, Direct   2. Stroke due to intracerebral hemorrhage, secondary to carotid artery dissection  aspirin 81 MG EC tablet   3. Lumbar radiculopathy  diclofenac sodium (VOLTAREN) 1 % Gel   4. Vitamin D deficiency  ergocalciferol (VITAMIN D2) 50,000 unit capsule   5. Essential hypertension with goal blood pressure less than 140/90  lisinopril (PRINIVIL,ZESTRIL) 20 MG tablet   6. Tinea versicolor  selenium sulfide (SELSUN) 2.5 % lotion   7. Hyperlipidemia, unspecified hyperlipidemia type     8. Hemiplegia of left dominant side as late effect of cerebral infarction, unspecified hemiplegia type (H)     Diabetes type 2, hypertension, hyperlipidemia, currently uncontrolled, but patient was not taking his medication for the past 2 months because of lack of insurance, he does get a new insurance and new prescription was sent to his pharmacy, will have him follow-up with in-house pharmacist for a recheck in about a month.  Adjustment could be made at that time.  Stressed the importance of compliance with therapy, bring medication for reconciliation at the next visit.    CHIEF COMPLAINT   Follow-up (medication - diabetes)    MORIAH Muñoz is a 34 y.o. male.  Established at [facility] 9/2014.  Previously at [facility].  Past history of stroke with left hemiparesis, type 2 diabetes mellitus, hypertension, obesity and severe recurrent major depression followed by " "counselor.  Follow-up on chronic medical issues, unfortunately did not have insurance for the past 3 months, was not taking his medication.  He does get a new insurance and would like to resume diabetes type 2 not adequately controlled, A1c went up from 6.9% last March to 8.9% today, but again patient was not taking his medication for the past 2 months.  Denies any excessive polyuria polydipsia.  Is taking Lantus 35 units daily,  Unfortunately did not bring his medication to reconcile today, he is not sure if he is taking Lipitor or fenofibrate or both.  He does have hyperlipidemia, hypertriglyceridemia.  He does follow with a psychiatrist for depression and insomnia and anxiety.        Review of Systems As per HPI, otherwise negative.    OBJECTIVE   /80 (Patient Site: Left Arm, Patient Position: Sitting, Cuff Size: Adult Regular)   Pulse 64   Ht 5' 2\" (1.575 m)   Wt 157 lb (71.2 kg)   SpO2 98%   BMI 28.72 kg/m    Physical Exam   Constitutional: He is oriented to person, place, and time. He appears well-developed and well-nourished.   HENT:   Head: Normocephalic and atraumatic.   Neck: Normal range of motion. Neck supple. No JVD present. No tracheal deviation present. No thyromegaly present.   Cardiovascular: Normal rate, regular rhythm, normal heart sounds and intact distal pulses. Exam reveals no gallop and no friction rub.   No murmur heard.  Pulmonary/Chest: Effort normal and breath sounds normal. No respiratory distress. He has no wheezes. He has no rales.   Musculoskeletal: He exhibits no edema or tenderness.   Lymphadenopathy:     He has no cervical adenopathy.   Neurological: He is alert and oriented to person, place, and time. Coordination normal.   Chronic left hemiparesis.   Psychiatric: He has a normal mood and affect. Judgment and thought content normal.       PFSH     Family History   Problem Relation Age of Onset     No Medical Problems Mother      No Medical Problems Father      No Medical " Problems Sister      No Medical Problems Brother      No Medical Problems Maternal Grandmother      No Medical Problems Maternal Grandfather      No Medical Problems Paternal Grandmother      No Medical Problems Paternal Grandfather      Social History     Socioeconomic History     Marital status: Single     Spouse name: Not on file     Number of children: Not on file     Years of education: Not on file     Highest education level: Not on file   Occupational History     Not on file   Social Needs     Financial resource strain: Not on file     Food insecurity:     Worry: Not on file     Inability: Not on file     Transportation needs:     Medical: Not on file     Non-medical: Not on file   Tobacco Use     Smoking status: Never Smoker     Smokeless tobacco: Never Used   Substance and Sexual Activity     Alcohol use: No     Drug use: No     Sexual activity: Not on file   Lifestyle     Physical activity:     Days per week: Not on file     Minutes per session: Not on file     Stress: Not on file   Relationships     Social connections:     Talks on phone: Not on file     Gets together: Not on file     Attends Holiness service: Not on file     Active member of club or organization: Not on file     Attends meetings of clubs or organizations: Not on file     Relationship status: Not on file     Intimate partner violence:     Fear of current or ex partner: Not on file     Emotionally abused: Not on file     Physically abused: Not on file     Forced sexual activity: Not on file   Other Topics Concern     Not on file   Social History Narrative     Not on file     Relevant history was reviewed with the patient today, unless noted in HPI, nothing is pertinent for this visit.  [facility]     Patient Active Problem List    Diagnosis Date Noted     Hemiplegia of left dominant side as late effect of cerebral infarction, unspecified hemiplegia type (H) 07/29/2019     Non compliance with medical treatment 01/23/2018     Stenosis of right  carotid artery 08/26/2017     History of right carotid artery dissection 08/26/2017     Vitamin D deficiency disease 08/06/2016     Hypertriglyceridemia 08/06/2016     Failure to attend appointment 08/10/2015     Overview Note:     8/10/2015 (Dr. Grijalva)       Insomnia 04/27/2015     Lumbar radiculopathy 04/27/2015     Low back pain 04/27/2015     Neck pain 04/27/2015     Tinea versicolor 04/27/2015     Stroke due to intracerebral hemorrhage, secondary to carotid artery dissection 04/27/2015     Overview Note:     Right cerebrovascular accident secondary to a right internal carotid artery dissection.  Surgical procedure recommended by Neurologist declined.       Mixed hyperlipidemia      Severe episode of recurrent major depressive disorder, without psychotic features (H)      Overview Note:     Replacement Utility updated for latest IMO load       Blurry Vision      Essential hypertension with goal blood pressure less than 140/90      Overview Note:     Replacement Utility updated for latest IMO load       Fatigue      Hemiparesis Of Left Side      Overview Note:     Replacement Utility updated for latest IMO load       History of ischemic stroke without residual deficits      Type 2 diabetes mellitus with hyperglycemia, with long-term current use of insulin (H)      Overview Note:     Diagnosed at time of stroke, 2013.       Vitamin D Deficiency      Overview Note:     Replacement Utility updated for latest IMO load       No past surgical history on file.    RESULTS/CONSULTS (Lab/Rad)     Recent Results (from the past 168 hour(s))   Glycosylated Hemoglobin A1c   Result Value Ref Range    Hemoglobin A1c 8.9 (H) 3.5 - 6.0 %   Comprehensive Metabolic Panel   Result Value Ref Range    Sodium 140 136 - 145 mmol/L    Potassium 4.1 3.5 - 5.0 mmol/L    Chloride 104 98 - 107 mmol/L    CO2 27 22 - 31 mmol/L    Anion Gap, Calculation 9 5 - 18 mmol/L    Glucose 205 (H) 70 - 125 mg/dL    BUN 14 8 - 22 mg/dL    Creatinine  0.84 0.70 - 1.30 mg/dL    GFR MDRD Af Amer >60 >60 mL/min/1.73m2    GFR MDRD Non Af Amer >60 >60 mL/min/1.73m2    Bilirubin, Total 0.7 0.0 - 1.0 mg/dL    Calcium 10.0 8.5 - 10.5 mg/dL    Protein, Total 7.3 6.0 - 8.0 g/dL    Albumin 4.3 3.5 - 5.0 g/dL    Alkaline Phosphatase 65 45 - 120 U/L    AST 17 0 - 40 U/L    ALT 29 0 - 45 U/L   Lipid Cascade   Result Value Ref Range    Cholesterol 263 (H) <=199 mg/dL    Triglycerides 461 (H) <=149 mg/dL    HDL Cholesterol 36 (L) >=40 mg/dL    LDL Calculated  <=129 mg/dL    Patient Fasting > 8hrs? Unknown    Custom LDL Cholesterol, Direct   Result Value Ref Range    Direct  (H) <=129 mg/dl     No results found.  MEDICATIONS     Current Outpatient Medications on File Prior to Visit   Medication Sig Dispense Refill     blood glucose meter (ONETOUCH ULTRA2) Use to check blood sugars twice daily. OneTouch Ultra machine to go with OneTouch Ultra strips. 1 each 0     fenofibrate (TRIGLIDE) 160 MG tablet TAKE 1 TABLET BY MOUTH EVERY DAY 30 tablet 7     insulin glargine (LANTUS; BASAGLAR) 100 unit/mL (3 mL) pen Inject 37 Units under the skin at bedtime. 15 mL 5     sertraline (ZOLOFT) 50 MG tablet Take 1 tablet (50 mg total) by mouth daily. 30 tablet 2     traZODone (DESYREL) 50 MG tablet TAKE 1 TABLET BY MOUTH EVERY DAY AT BEDTIME AS NEEDED  4     No current facility-administered medications on file prior to visit.        HEALTH MAINTENANCE / SCREENING   PHQ-2 Total Score: 4 (7/29/2019 10:00 AM)  , PHQ-9 Total Score: 10 (7/29/2019 10:00 AM)  ,HAI 7 Total Score: 5 (7/29/2019 10:00 AM)    Immunization History   Administered Date(s) Administered     Influenza,seasonal quad, PF 09/23/2014     Tdap 10/05/2016     Health Maintenance   Topic     PREVENTIVE CARE VISIT      HIV SCREENING      DIABETES FOOT EXAM      DIABETES OPHTHALMOLOGY EXAM      DIABETES URINE MICROALBUMIN      DEPRESSION FOLLOW UP      DIABETES HEMOGLOBIN A1C      DIABETES FOLLOW-UP      ADVANCE CARE PLANNING       TDAP ADULT ONE TIME DOSE      TD 18+ HE      INFLUENZA VACCINE RULE BASED      I have had an Advance Directives discussion with the patient.  The following are part of a depression follow up plan for the patient:  coping support assessment, coping support management, emotional support assessment and emotional support education  The following high BMI interventions were performed this visit: encouragement to exercise and weight loss from baseline weight  Lisseth Ho MD  Family Medicine, [facility], [place]     This note was dictated using a voice recognition software.  Any grammatical or context distortion are unintentional and inherent to the software.

## 2021-05-30 NOTE — TELEPHONE ENCOUNTER
Spoke with patient through  Services. Relayed message per MD. Patient asked us to call his  Key through [employer] to setup the appointment, it depends on her schedule @[phone].

## 2021-05-30 NOTE — TELEPHONE ENCOUNTER
Spoke with Key w/[employer]- Services, relayed message below. Appointment scheduled 7/29/19 @9:40am, Key will call the patient with appointment date/time.

## 2021-05-31 VITALS — BODY MASS INDEX: 28.16 KG/M2 | HEIGHT: 62 IN | WEIGHT: 153 LBS

## 2021-05-31 VITALS — WEIGHT: 150 LBS | HEIGHT: 62 IN | BODY MASS INDEX: 27.6 KG/M2

## 2021-05-31 VITALS — HEIGHT: 62 IN | BODY MASS INDEX: 28.34 KG/M2 | WEIGHT: 154 LBS

## 2021-05-31 NOTE — TELEPHONE ENCOUNTER
RN cannot approve Refill Request    RN can NOT refill this medication Protocol failed and NO refill given.    Paula Chowdary, [facility] Triage/Med Refill 8/30/2019    Requested Prescriptions   Pending Prescriptions Disp Refills     BASAGLAR KWIKPEN U-100 INSULIN 100 unit/mL (3 mL) pen [Pharmacy Med Name: BASAGLAR 100 UNIT/ML KWIKPEN]  5     Sig: INJECT 40 UNITS UNDER THE SKIN AT BEDTIME       Insulin/GLP-1 Refill Protocol Failed - 8/29/2019 10:34 AM        Failed - Microalbumin in last year     Microalbumin, Random Urine   Date Value Ref Range Status   08/03/2018 79.94 (H) 0.00 - 1.99 mg/dL Final                  Passed - Visit with PCP or prescribing provider visit in last 6 months     Last office visit with prescriber/PCP: 7/29/2019 OR same dept: 7/29/2019 Lisseth Ho MD OR same specialty: 7/29/2019 Lisseth Ho MD Last physical: Visit date not found Last MTM visit: Visit date not found     Next appt within 3 mo: Visit date not found  Next physical within 3 mo: Visit date not found  Prescriber OR PCP: Lisseth Ho MD  Last diagnosis associated with med order: 1. Type 2 diabetes mellitus with hyperglycemia, with long-term current use of insulin (H)  - BASAGLAR KWIKPEN U-100 INSULIN 100 unit/mL (3 mL) pen [Pharmacy Med Name: BASAGLAR 100 UNIT/ML KWIKPEN]; INJECT 40 UNITS UNDER THE SKIN AT BEDTIME; Refill: 5    If protocol passes may refill for 6 months if within 3 months of last provider visit (or a total of 9 months).              Passed - A1C in last 6 months     Hemoglobin A1c   Date Value Ref Range Status   07/29/2019 8.9 (H) 3.5 - 6.0 % Final               Passed - Blood pressure in last year     BP Readings from Last 1 Encounters:   07/29/19 120/80             Passed - Creatinine done in last year     Creatinine   Date Value Ref Range Status   07/29/2019 0.84 0.70 - 1.30 mg/dL Final

## 2021-06-01 VITALS — WEIGHT: 140 LBS | BODY MASS INDEX: 25.61 KG/M2

## 2021-06-01 VITALS — WEIGHT: 146 LBS | BODY MASS INDEX: 26.87 KG/M2 | HEIGHT: 62 IN

## 2021-06-01 VITALS — WEIGHT: 146 LBS | BODY MASS INDEX: 26.7 KG/M2

## 2021-06-01 VITALS — BODY MASS INDEX: 26.34 KG/M2 | WEIGHT: 144 LBS

## 2021-06-01 VITALS — BODY MASS INDEX: 26.89 KG/M2 | WEIGHT: 147 LBS

## 2021-06-02 VITALS — WEIGHT: 147 LBS | BODY MASS INDEX: 26.89 KG/M2

## 2021-06-02 VITALS — WEIGHT: 152 LBS | BODY MASS INDEX: 27.8 KG/M2

## 2021-06-02 VITALS — BODY MASS INDEX: 27.62 KG/M2 | WEIGHT: 151 LBS

## 2021-06-02 NOTE — PROGRESS NOTES
MTM Follow Up Encounter  Assessment & Plan                                                     Medication Adherence/Access: Very poor adherence. Reviewed the consequences of not taking his medications. He cannot read English, therefore suggested switching to a different pharmacy that can write directions in Hmong. Will have them write indications as well. I wrote and conor on the labels so he has an understanding of what he is taking.     Diabetes:  Patient is not taking. Reviewed the risks of uncontrolled diabetes. Unable to assess, but likely not controlled. Will have him restart medications at previous doses, but recommended starting metformin at 500 mg and increasing by 500 mg weekly until at 2000 mg. He is interested in CGM therefore will send prescription for FreeStyle Jazmyne. Recommended restarting lisinopril since he has a history of albuminuria.   PLAN:   1. Restart metformin, Trulicity, Lantus, and lisinopril   2. Rx for FreeStyle Jazmyne sent     Hyperlipidemia: Patient is not taking. Patient's cholesterol is very high. Will have him start with taking atorvastatin and if trigs remain >500 mg/dL despite adherence, will then restart fenofibrate.   PLAN:   1. Restart atorvastatin 80 mg daily     Stroke History: Patient is not taking. Reviewed the importance of statin and aspirin since he has had a stroke.   PLAN  1. Restart atorvastatin 80 mg and aspirin 81 mg daily     Mood/Insomnia: Patient is not taking. Recommended restarting sertraline per psych recommendation. He reports sleeping ok, but will use trazodone PRN.   PLAN:   1. Restart sertraline 50 mg daily  2. Use trazodone 50 mg HS PRN     Vitamin D Deficiency: Patient is not taking. Reviewed that he has had low Vitamin D levels in the past. Will have him restart at once weekly and recheck a Vitamin D level in the future.   PLAN:   1. Restart Vitamin D 50,000 IU at once weekly.     Follow Up  1 month    Subjective & Objective                                                        Sanchez Muñoz is a 34 y.o. male coming in for a follow up visit for Medication Therapy Management. He was referred to me from Lisseth Ho MD. A [facility]  joins us today.     Chief Complaint: No issues, feels fine.     Medication Adherence/Access: Did not have insurance for 2 months -- does not remember when he lost his insurance, did not have medications at the time and continues to not take them. Reports he feels normal. Brings his medications with him today but they are ful bottles filled from July.    No transportation, so  picks up his medications for him. She usually gets a text or call when ready. He does not read English.      Diabetes:  Pt is not taking any medication. Should be taking metformin 2 gram daily, Trulicity 1.5 mg weekly, Lantus 37 units at bedtime. Reports he would like to see what happens without the medications.   Denies hyperglycemia symptoms.   Last A1c = 8.9% 7/29/19, previously 6.9% on 3/5/19  SMBG: did not bring glucometer today. Reports he lost his glucometer. Would be interested in CGM.   ACEi/ARB:  Not taking Lisinopril 20 mg daily. Last microalbuminuria 8/3/18  Has been going to the gym a lot.      Hyperlipidemia: Pt is not taking any medication. Should be taking Atorvastatin 80 mg daily + Fenofibrate 160 mg daily.   Last lipids checked 7/29/19     Stroke History: Pt is not taking any medication. Brings aspirin 81 mg daily and atorvastatin with him today. Hx stroke 2013. Residual hemiparesis.     Mood/Insomnia: Pt is not taking any medication. Brings sertraline 50 mg daily and trazodone 50 mg HS. Follows with [facility]. Reports he was seen last week and he told NP that he was not taking sertraline -- was told to restart. Reports he is sleeping ok and he would like to take trazodone PRN. Therapist every 3 months.      Vitamin D Deficiency: Not taking Vitamin D 50,000 twice weekly. Brings with him today.   Vitamin D, Total (25-Hydroxy)    Date Value Ref Range Status   03/05/2019 33.9 30.0 - 80.0 ng/mL Final       PMH: reviewed in EPIC   Allergies/ADRs: reviewed in EPIC   Alcohol: reviewed in EPIC  Tobacco:   Social History     Tobacco Use   Smoking Status Never Smoker   Smokeless Tobacco Never Used     Today's Vitals:   Vitals:    10/04/19 1356   BP: 112/80   Weight: 156 lb (70.8 kg)     ----------------    Much or all of the text in this note was generated through the use of Dragon Dictate voice-to-text software. Errors in spelling or words which seem out of context are unintentional. Sound alike errors, in particular, may have escaped editing.    The patient declined an after visit summary    I spent 30 minutes with this patient today;   All changes were made via collaborative practice agreement with Lisseth Ho MD. A copy of the visit note was provided to the patient's provider.     Gabby Gonsalez, Pharm.D., BCACP  Medication Therapy Management Pharmacist  [facility]     Current Outpatient Medications   Medication Sig Dispense Refill     aspirin 81 MG EC tablet Take 1 tablet (81 mg total) by mouth daily. 90 tablet 3     atorvastatin (LIPITOR) 80 MG tablet Take 1 tablet (80 mg total) by mouth daily. 90 tablet 3     BASAGLAR KWIKPEN U-100 INSULIN 100 unit/mL (3 mL) pen INJECT 40 UNITS UNDER THE SKIN AT BEDTIME 15 adj dose pen 5     blood glucose meter (ONETOUCH ULTRA2) Use to check blood sugars twice daily. OneTouch Ultra machine to go with OneTouch Ultra strips. 1 each 0     blood glucose test strips Use 1 each As Directed as needed. Dispense brand per patient's insurance at pharmacy discretion. 200 strip prn     diclofenac sodium (VOLTAREN) 1 % Gel Use dosing card to apply 2 grams topically four times daily as needed 100 g 2     dulaglutide (TRULICITY) 1.5 mg/0.5 mL PnIj Inject 1.5 mg under the skin every 7 days. 2 Syringe 2     ergocalciferol (VITAMIN D2) 50,000 unit capsule Take 1 capsule (50,000 Units total)  "by mouth 2 (two) times a week. 8 capsule 11     fenofibrate (TRIGLIDE) 160 MG tablet Take 1 tablet (160 mg total) by mouth daily. 30 tablet 7     generic lancets (FINGERSTIX LANCETS) Dispense brand per patient's insurance at pharmacy discretion. 200 each prn     insulin glargine (LANTUS; BASAGLAR) 100 unit/mL (3 mL) pen Inject 37 Units under the skin at bedtime. 15 mL 5     lisinopril (PRINIVIL,ZESTRIL) 20 MG tablet Take 1 tablet (20 mg total) by mouth daily. 90 tablet 3     metFORMIN (GLUCOPHAGE-XR) 500 MG 24 hr tablet Take 4 tablets (2,000 mg total) by mouth daily. 360 tablet 2     pen needle, diabetic (BD ULTRA-FINE NEHAL PEN NEEDLE) 32 gauge x 5/32\" Ndle Use one pen needle daily to inject Lantus (insulin) and Victoza 100 each 3     selenium sulfide (SELSUN) 2.5 % lotion Leave on trunk and arms 10 min, then wash off.  Repeat 3-5 times per wek. 120 mL 1     sertraline (ZOLOFT) 50 MG tablet Take 1 tablet (50 mg total) by mouth daily. 30 tablet 2     traZODone (DESYREL) 50 MG tablet TAKE 1 TABLET BY MOUTH EVERY DAY AT BEDTIME AS NEEDED  4     No current facility-administered medications for this visit.                            "

## 2021-06-02 NOTE — PROGRESS NOTES
MTM Follow Up Encounter  Assessment & Plan                                                       Medication Adherence/Access: Patient still has not started the medications we restarted last month. Recommended that he not wait for [facility] to call him -- go to pharmacy on Monday (not open on weekends) and  medications. I called [facility] and told them to get everything ready. Resent for 90 days since he will be in [place]. Will have Xavi add indication on bottle and directions in [facility].   PLAN:   1.  all medications on Monday and restart     Type 2 Diabetes:  control uncertain. A1C was not goal of <7% and similar to three months ago. FBG unknown if at goal  mg/dL. Encouraged him to  and restart medications, including FreeStyle Jazmyne. He will have someone at home who can read English put it on him. Encouraged his continued exercise. Checking microalbuminuria today as well since >1 year.   PLAN:   1. A1c and microalbuminuria today     Hyperlipidemia: See stroke hx.       Stroke History: Patient is not currently taking. Would be indicated due to stroke hx. Will have him take atorvastatin regularly and if trigs >500, then will restart fenofibrate. Encouraged him to restart both.      Mood/Insomnia: Is currently not taking. Encouraged him to restart and will monitor changes in mood and sleep at follow up.      Vitamin D Deficiency: Last Vitamin D level was normal, but then patient was off Vitamin D weekly again. He has been off since he did not  his medications. Encouraged him to restart twice weekly and will monitor level when he is regularly taking.     Follow Up  2 months (patient will be on [place] for 1 month)    Subjective & Objective                                                       Sanchez Muñoz is a 34 y.o. male coming in for a follow up visit for Medication Therapy Management. He was referred to me from Lisseth Ho MD. A [facility]  joins us today, [facility].     Chief Complaint:  Is going to be in [facility] from 11/20-12/20.     Medication Adherence/Access: Did not have insurance for 2 months -- does not remember when he lost his insurance, did not have medications at the time and continues to take the old medications. Feels normal. Brings his medications with him today but they are from July.  At last MTM appt, we switched his pharmacy to [facility] but since he did not receive a phone call from them, he did not  his medications. No transportation. He does not read English but reads Hmong, which is why it was sent to [facility].      Type 2 Diabetes:  Should be taking metformin 2 gram daily, Trulicity 1.5 mg weekly, Lantus 37 units at bedtime. He claims to be taking Lantus.   Denies hyperglycemia symptoms.   Last A1c = 8.9% today, previously 8.9% 7/29/19  SMBG: did not bring glucometer today.   Would be interested in CGM but he did not  Jazmyne that was prescribed at last MTM appt.   ACEi/ARB:  Not taking Lisinopril 20 mg daily. Last microalbuminuria 8/3/18  Has been going to the gym a lot.      Hyperlipidemia: Pt should be taking Atorvastatin 80 mg daily. At last MTM appt, held Fenofibrate 160 mg daily and will restart in the future if trigs >500 in the future despite well controlled BG.   Last lipids checked 7/29/19     Stroke History: Pt brings aspirin 81 mg daily and atorvastatin with him today. Hx stroke 2013. Residual hemiparesis.     Mood/Insomnia: Pt brings sertraline 50 mg daily and trazodone 50 mg HS. Follows with Bong Bauer. Therapist every 3 months.      Vitamin D Deficiency: Pt brings Vitamin D 50,000 twice weekly with him today.         Vitamin D, Total (25-Hydroxy)   Date Value Ref Range Status   03/05/2019 33.9 30.0 - 80.0 ng/mL Final       PMH: reviewed in EPIC   Allergies/ADRs: reviewed in EPIC   Alcohol: reviewed in EPIC  Tobacco:   Social History     Tobacco Use   Smoking Status Never Smoker   Smokeless Tobacco Never Used     Today's Vitals: There were no vitals filed for  this visit.  ----------------    The patient declined an after visit summary    I spent 30 minutes with this patient today;   All changes were made via collaborative practice agreement with Lisseth Ho MD. A copy of the visit note was provided to the patient's provider.     Gabby Gonsalez Pharm.D., [facility]  Medication Therapy Management Pharmacist  [facility] and [facility]     Current Outpatient Medications   Medication Sig Dispense Refill     aspirin 81 MG EC tablet Take 1 tablet (81 mg total) by mouth daily. To prevent stroke 30 tablet 0     atorvastatin (LIPITOR) 80 MG tablet Take 1 tablet (80 mg total) by mouth daily. For cholesterol 30 tablet 0     blood glucose meter (ONETOUCH ULTRA2) Use to check blood sugars twice daily. OneTouch Ultra machine to go with OneTouch Ultra strips. 1 each 0     blood glucose test strips Use 1 each As Directed as needed. Dispense brand per patient's insurance at pharmacy discretion. 200 strip prn     diclofenac sodium (VOLTAREN) 1 % Gel Use dosing card to apply 2 grams topically four times daily as needed 100 g 2     dulaglutide (TRULICITY) 1.5 mg/0.5 mL PnIj Inject 1.5 mg under the skin every 7 days. For blood sugars 2 mL 0     ergocalciferol (VITAMIN D2) 50,000 unit capsule Take 1 capsule (50,000 Units total) by mouth once a week. For low Vitamin D 8 capsule 11     flash glucose scanning reader (FREESTYLE HENRIQUE 14 DAY READER) Misc Use 1 each As Directed continuous as needed. 1 each 0     flash glucose sensor (FREESTYLE HENRIQUE 14 DAY SENSOR) Kit Use 1 each As Directed continuous as needed. 21 kit 11     generic lancets (FINGERSTIX LANCETS) Dispense brand per patient's insurance at pharmacy discretion. 200 each prn     insulin glargine (BASAGLAR KWIKPEN) 100 unit/mL (3 mL) pen Inject 37 Units under the skin daily. Please write directions in Hmong 15 mL 0     lisinopril (PRINIVIL,ZESTRIL) 20 MG tablet Take 1 tablet (20 mg total) by mouth daily. For kidneys 30  "tablet 0     metFORMIN (GLUCOPHAGE-XR) 500 MG 24 hr tablet Take 4 tablets (2,000 mg total) by mouth daily. For diabetes 120 tablet 0     pen needle, diabetic (BD ULTRA-FINE NEHAL PEN NEEDLE) 32 gauge x 5/32\" Ndle Use one pen needle daily to inject Lantus (insulin) and Victoza 100 each 3     selenium sulfide (SELSUN) 2.5 % lotion Leave on trunk and arms 10 min, then wash off.  Repeat 3-5 times per wek. 120 mL 1     sertraline (ZOLOFT) 50 MG tablet Take 1 tablet (50 mg total) by mouth daily. For mood 30 tablet 0     traZODone (DESYREL) 50 MG tablet Take 1 tablet (50 mg total) by mouth at bedtime as needed for sleep. Please write directions in Hmong 30 tablet 0     No current facility-administered medications for this visit.                              "

## 2021-06-03 VITALS — HEIGHT: 62 IN | WEIGHT: 157 LBS | BODY MASS INDEX: 28.89 KG/M2

## 2021-06-03 VITALS — BODY MASS INDEX: 28.53 KG/M2 | WEIGHT: 156 LBS | SYSTOLIC BLOOD PRESSURE: 112 MMHG | DIASTOLIC BLOOD PRESSURE: 80 MMHG

## 2021-06-04 VITALS — BODY MASS INDEX: 27.8 KG/M2 | WEIGHT: 152 LBS

## 2021-06-05 ENCOUNTER — RECORDS - HEALTHEAST (OUTPATIENT)
Dept: MRI IMAGING | Facility: CLINIC | Age: 36
End: 2021-06-05

## 2021-06-05 VITALS
WEIGHT: 158 LBS | DIASTOLIC BLOOD PRESSURE: 88 MMHG | HEART RATE: 94 BPM | HEIGHT: 62 IN | BODY MASS INDEX: 29.08 KG/M2 | SYSTOLIC BLOOD PRESSURE: 129 MMHG

## 2021-06-05 VITALS
DIASTOLIC BLOOD PRESSURE: 91 MMHG | WEIGHT: 157 LBS | OXYGEN SATURATION: 99 % | HEART RATE: 93 BPM | RESPIRATION RATE: 14 BRPM | SYSTOLIC BLOOD PRESSURE: 133 MMHG | BODY MASS INDEX: 29.18 KG/M2

## 2021-06-05 VITALS
WEIGHT: 154 LBS | SYSTOLIC BLOOD PRESSURE: 133 MMHG | RESPIRATION RATE: 16 BRPM | TEMPERATURE: 97.7 F | HEIGHT: 62 IN | BODY MASS INDEX: 28.34 KG/M2 | DIASTOLIC BLOOD PRESSURE: 84 MMHG | HEART RATE: 75 BPM

## 2021-06-05 VITALS
TEMPERATURE: 98.1 F | BODY MASS INDEX: 28.53 KG/M2 | SYSTOLIC BLOOD PRESSURE: 138 MMHG | WEIGHT: 156 LBS | OXYGEN SATURATION: 98 % | HEART RATE: 82 BPM | DIASTOLIC BLOOD PRESSURE: 88 MMHG

## 2021-06-05 VITALS
BODY MASS INDEX: 28.17 KG/M2 | DIASTOLIC BLOOD PRESSURE: 90 MMHG | SYSTOLIC BLOOD PRESSURE: 150 MMHG | HEART RATE: 69 BPM | WEIGHT: 154 LBS | RESPIRATION RATE: 16 BRPM

## 2021-06-05 DIAGNOSIS — I67.89 ACUTE ILL-DEFINED CEREBROVASCULAR DISEASE: ICD-10-CM

## 2021-06-05 NOTE — TELEPHONE ENCOUNTER
I called the pharmacy and they didn't have the patient's Medicare information on file to bill his part B plan (instead of part D) and I don't see it readily available in his chart.     Could someone contact the patient to get his Medicare card info please? We would need his Medicare ID number. Thank you!

## 2021-06-05 NOTE — TELEPHONE ENCOUNTER
Sanchez was here with Key  for follow up. Did not bring any medications or blood sugars. Was in [place] and reports only bringing Trulicity with him. Reports now that he is back he is on his medications again. Notes no issues or concerns.     Called pharmacy to ensure refills:   Aspirin Trulicity Vitamin D lisinopril last picked up yesterday   atorvastatin metformin trazodone and basaglar last picked up Nov for 3 months     [facility]ine [facility] -- will reverse claim so patient can get at [facility]    Jazmyne will need to get at [facility] due to part B billing. [facility] does not do part B billing. Will send to [facility] and call tomorrow to ensure coverage.     Gabby Gonsalez, Pharm.D., BCACP  Medication Therapy Management Pharmacist  [facility] and [facility]

## 2021-06-05 NOTE — TELEPHONE ENCOUNTER
Drug Change Request  Who is calling?:  Pharmacy  What is the current medication?:  flash glucose scanning reader ([employer] HENRIQUE 14 DAY READER) Misc  What alternative is being requested?: no identified alternative  Why the request to change?:  Insurance does not cover this device  [facility]ed [facility]  Okay to leave a detailed message?:  Yes

## 2021-06-05 NOTE — TELEPHONE ENCOUNTER
Patient has [employer] for Part D insurance, but we need his Medicare ID number for Part [place] billing. [place] and found [place] ID: [id number].     I have tried calling the pharmacy to provide this information, but have been unable to reach anyone due to long hold times. Can someone assist in trying to contact the pharmacy ([phone]) and provide the patient's Medicare ID please? I will also forward to Gabby when she is back in clinic tomorrow.

## 2021-06-05 NOTE — TELEPHONE ENCOUNTER
Spoke to [facility]. They are working on the Part B billing and need approval from patient to continue. It is likely however that it wont be covered since patient is only on one injection/day.

## 2021-06-05 NOTE — PROGRESS NOTES
MTM Initial Encounter  Assessment & Plan                                                     Type 2 Diabetes:  Some improvement and control uncertain. A1C not at goal of <7%, but slightly improved. FBG unknown if at goal  mg/dL. Looking past at last appointments, he has not brought in BG for over a year. Provided him with OneTouch Verio Flex machine today -- he recognized it and did not want me to review how to use. Jazmyne not covered but will try again in April. Encouraged him to continue exercise and diet changes, he has lost weight. Will not adjust medications until I see BG values.   PLAN:   1. A1c today.   2. Start checking blood sugars. Bring to follow up       Hyperlipidemia: Will have him take atorvastatin regularly and if trigs >500, then will restart fenofibrate.     Stroke History: Aspirin and statin indicated due to stroke hx. Will refill today to ensure adherence.      Mood/Insomnia: Stable. Recommended to continue current regimen and follow up with psych. Will have him stay off trazodone at this time.      Vitamin D Deficiency: Last Vitamin D level WNL. Will refill Vitamin D for him today to ensure adherence.     Follow Up  1 month    Subjective & Objective                                                       Sanchez Muñoz is a 34 y.o. male coming in for a follow up visit for Medication Therapy Management. He was referred to me from Lisseth Ho MD. A [employer]  joins us today.     Chief Complaint: Follow up.     Medication Adherence/Access: Brought his pill medications today.  picks up his medications for him. He does not read English but reads [place]    Type 2 Diabetes:  Should be taking metformin 2 gram daily, Trulicity 1.5 mg weekly, Lantus 37 units at bedtime. He claims to be taking all three and confirms doses.   Denies hypoglycemia symptoms.   Last A1c = 8.2% today, previously 8.9% 11/1/19  SMBG: did not bring glucometer today. Reports he has not checked blood  sugars in a very long time.   freeStyle Jazmyne was not covered.   ACEi/ARB:  Taking Lisinopril 20 mg daily. Brings with him today.  Last microalbuminuria 11/1/19  Has been going to the gym a lot and eating less.      Hyperlipidemia: Pt should be taking Atorvastatin 80 mg daily - does not have with him. At last MTM appt, held Fenofibrate 160 mg daily and will restart in the future if trigs >500 in the future despite well controlled BG.   Last lipids checked 7/29/19     Stroke History: Pt brings aspirin 81 mg daily. Does not have atorvastatin 80 mg with him today. Hx stroke 2013. Residual hemiparesis.     Mood/Insomnia: Pt brings sertraline 50 mg daily with him today. Denies issues. Does not have trazodone 50 mg HS -- reports that he is sleeping fine and would like to try without. Follows with Bong Bauer. Therapist every 3 months.      Vitamin D Deficiency: Pt brings empty bottle of Vitamin D 50,000 once weekly with him today.   Vitamin D, Total (25-Hydroxy)   Date Value Ref Range Status   03/05/2019 33.9 30.0 - 80.0 ng/mL Final       PMH: reviewed in EPIC   Allergies/ADRs: reviewed in EPIC   Alcohol: reviewed in Epic   Tobacco:   Social History     Tobacco Use   Smoking Status Never Smoker   Smokeless Tobacco Never Used     Today's Vitals:   Vitals:    02/07/20 0956   Weight: 152 lb (68.9 kg)     ----------------    The patient declined an after visit summary    I spent 30 minutes with this patient today;   All changes were made via collaborative practice agreement with Lisseth Ho MD. A copy of the visit note was provided to the patient's provider.     Gabby Gonsalez, Pharm.D., BCACP  Medication Therapy Management Pharmacist  [facility] and [facility]     Current Outpatient Medications   Medication Sig Dispense Refill     aspirin 81 MG EC tablet Take 1 tablet (81 mg total) by mouth daily. To prevent stroke 90 tablet 0     atorvastatin (LIPITOR) 80 MG tablet Take 1 tablet (80 mg total) by mouth  "daily. For cholesterol 90 tablet 0     blood glucose meter (ONETOUCH ULTRA2) Use to check blood sugars twice daily. OneTouch Ultra machine to go with OneTouch Ultra strips. 1 each 0     blood glucose test strips Use 1 each As Directed as needed. Dispense brand per patient's insurance at pharmacy discretion. 200 strip prn     diclofenac sodium (VOLTAREN) 1 % Gel Use dosing card to apply 2 grams topically four times daily as needed 100 g 2     dulaglutide (TRULICITY) 1.5 mg/0.5 mL PnIj Inject 1.5 mg under the skin every 7 days. For blood sugars 6 mL 0     ergocalciferol (VITAMIN D2) 50,000 unit capsule Take 1 capsule (50,000 Units total) by mouth once a week. For low Vitamin D 12 capsule 0     flash glucose scanning reader (FREESTYLE HENRIQUE 14 DAY READER) Misc Use 1 each As Directed continuous as needed. 1 each 0     flash glucose sensor (FREESTYLE HENRIQUE 14 DAY SENSOR) Kit Use 1 each As Directed continuous as needed. Change every 14 days 2 kit 11     generic lancets (FINGERSTIX LANCETS) Dispense brand per patient's insurance at pharmacy discretion. 200 each prn     insulin glargine (BASAGLAR KWIKPEN) 100 unit/mL (3 mL) pen Inject 37 Units under the skin daily. Please write directions in Hmong 45 mL 0     lisinopril (PRINIVIL,ZESTRIL) 20 MG tablet Take 1 tablet (20 mg total) by mouth daily. For kidneys 90 tablet 0     metFORMIN (GLUCOPHAGE-XR) 500 MG 24 hr tablet Take 4 tablets (2,000 mg total) by mouth daily. For diabetes 360 tablet 0     pen needle, diabetic (BD ULTRA-FINE NEHAL PEN NEEDLE) 32 gauge x 5/32\" Ndle Use one pen needle daily to inject Lantus (insulin) 100 each 3     selenium sulfide (SELSUN) 2.5 % lotion Leave on trunk and arms 10 min, then wash off.  Repeat 3-5 times per wek. 120 mL 1     sertraline (ZOLOFT) 50 MG tablet Take 1 tablet (50 mg total) by mouth daily. For mood 90 tablet 0     traZODone (DESYREL) 50 MG tablet Take 1 tablet (50 mg total) by mouth at bedtime as needed for sleep. Please write " directions in [facility] 90 tablet 0     No current facility-administered medications for this visit.

## 2021-06-06 NOTE — TELEPHONE ENCOUNTER
Refill Approved    Rx renewed per Medication Renewal Policy. Medication was last renewed on 2/7/20.    Paula Chowdary, [facility] Triage/Med Refill 3/9/2020     Requested Prescriptions   Pending Prescriptions Disp Refills     aspirin 81 mg chewable tablet [Pharmacy Med Name: ASPIRIN LOW DOSE 81 MG CHEW 81 TAB] 90 tablet 0     Sig: TAKE 1 TABLET BY MOUTH DAILY FOR STROKE PREVENTION // IB HNUB NOJ 1 LUB PAB KOM NTSHAV KHIAV ZOO       Aspirin/Dipyridamole Refill Protocol Passed - 3/6/2020  2:05 PM        Passed - PCP or prescribing provider visit in past 12 months       Last office visit with prescriber/PCP: 7/29/2019 Lisseth Ho MD OR same dept: 7/29/2019 Lisseth Ho MD OR same specialty: 7/29/2019 Lisseth Ho MD  Last physical: Visit date not found Last MTM visit: Visit date not found    Next appt within 3 mo: Visit date not found Next physical within 3 mo: Visit date not found  Prescriber OR PCP: Lisseth Ho MD  Last diagnosis associated with med order: 1. Type 2 diabetes mellitus with hyperglycemia, with long-term current use of insulin (H)  - aspirin 81 mg chewable tablet [Pharmacy Med Name: ASPIRIN LOW DOSE 81 MG CHEW 81 TAB]; TAKE 1 TABLET BY MOUTH DAILY FOR STROKE PREVENTION // IB HNUB NOJ 1 LUB PAB KOM NTSHAV KHIAV ZOO  Dispense: 90 tablet; Refill: 0    2. Stroke due to intracerebral hemorrhage, secondary to carotid artery dissection  - aspirin 81 mg chewable tablet [Pharmacy Med Name: ASPIRIN LOW DOSE 81 MG CHEW 81 TAB]; TAKE 1 TABLET BY MOUTH DAILY FOR STROKE PREVENTION // IB HNUB NOJ 1 LUB PAB KOM NTSHAV KHIAV ZOO  Dispense: 90 tablet; Refill: 0    If protocol passes may refill for 6 months if within 3 months of last provider visit (or a total of 9 months).

## 2021-06-06 NOTE — TELEPHONE ENCOUNTER
Name of form/paperwork: Other:  CMN     Date form received by clinic:    02/11/2020, 02/20/202      When is the form needed by? STAT    Patient Notified form requests are processed in 3-5 business days: Yes  (If patient needs for sooner, please note that in this message)- please expedite this request due to delay.    Okay to leave a detailed message: Yes     Please fax: Stat  Fax: [phone]    CMN  Office Notes  Prescriptions- Should read, To use with checking blood sugars per  directions, along with diagnosis.

## 2021-06-06 NOTE — TELEPHONE ENCOUNTER
Forms Request  Name of form/paperwork: Other:  certificate of medical necessity and clinical notes, medicare compliant prescription  Have you been seen for this request: Yes:  02/07/2020  Do we have the form: Yes- 02/11/2020 02/20/2020  When is form needed by: asap  How would you like the form returned: Fax:  [phone]  Patient Notified form requests are processed in 3-5 business days: Yes    Okay to leave a detailed message? Yes

## 2021-06-06 NOTE — TELEPHONE ENCOUNTER
Patient likely wont be covered since he is not on meal time insulin. Will discuss with patient at follow up next week.

## 2021-06-08 NOTE — TELEPHONE ENCOUNTER
Rec'd fax from [facility] requesting refill: Atorvastatin Calcium 80 mg tablets. Qty:90 refill:1

## 2021-06-09 NOTE — TELEPHONE ENCOUNTER
Spoke with patients Sister in-law in regards to Sanchez Muñoz . He is currently out of town and his family will tell him to reschedule at a later time.    JESSY Garcia

## 2021-06-09 NOTE — TELEPHONE ENCOUNTER
Refill Approved    Rx renewed per Medication Renewal Policy. Medication was last renewed on 6/5/20.2/7/20.    Paula Chowdary, [facility] Triage/Med Refill 6/24/2020     Requested Prescriptions   Pending Prescriptions Disp Refills     sertraline (ZOLOFT) 50 MG tablet [Pharmacy Med Name: SERTRALINE HCL 50 MG TABS 50 TAB] 30 tablet 0     Sig: TAKE 1 TABLET DAILY FOR DEPRESSION // 1 HNUB NOJ 1 LUB PAB [facility]       SSRI Refill Protocol  Passed - 6/24/2020  2:07 PM        Passed - PCP or prescribing provider visit in last year     Last office visit with prescriber/PCP: 7/29/2019 Lisseth Ho MD OR same dept: 7/29/2019 Lisseth Ho MD OR same specialty: 7/29/2019 Lisseth Ho MD  Last physical: Visit date not found Last MTM visit: Visit date not found   Next visit within 3 mo: Visit date not found  Next physical within 3 mo: Visit date not found  Prescriber OR PCP: Lisseth Ho MD  Last diagnosis associated with med order: 1. Severe episode of recurrent major depressive disorder, without psychotic features (H)  - sertraline (ZOLOFT) 50 MG tablet [Pharmacy Med Name: SERTRALINE HCL 50 MG TABS 50 TAB]; TAKE 1 TABLET DAILY FOR DEPRESSION // 1 HNUB NOJ 1 LUB [facility]AB [facility]  Dispense: 30 tablet; Refill: 0    2. Essential hypertension with goal blood pressure less than 140/90  - lisinopriL (PRINIVIL,ZESTRIL) 20 MG tablet [Pharmacy Med Name: LISINOPRIL 20 MG TABS 20 TAB]; TAKE 1 TABLET DAILY FOR BLOOD PRESSURE OR KIDNEY // 1 HNUB NOJ 1 LUB PAB CHASE NT[facility] SIAB LO LUB RAUM.  Dispense: 30 tablet; Refill: 0    3. Type 2 diabetes mellitus with hyperglycemia, with long-term current use of insulin (H)  - metFORMIN (GLUCOPHAGE-XR) 500 MG 24 hr tablet [Pharmacy Med Name: METFORMIN HCL  MG TB2 500 TAB]; TAKE 4 TABLET DAILY FOR DIABETES // 1 HNUB NOJ 4 LUB PAB CHASE NT[facility]AV QAB ZIB  Dispense: 360 tablet; Refill: 0  - atorvastatin (LIPITOR) 80 MG tablet [Pharmacy Med Name:  ATORVASTATIN 80 MG TABLET 80 TAB]; TAKE 1 TABLET BY MOUTH DAILY FOR CHOLESTEROL // IB HNUB NOJ 1 LUB [facility] NTSV MUAJ ROJ  Dispense: 90 tablet; Refill: 0    If protocol passes may refill for 12 months if within 3 months of last provider visit (or a total of 15 months).                lisinopriL (PRINIVIL,ZESTRIL) 20 MG tablet [Pharmacy Med Name: LISINOPRIL 20 MG TABS 20 TAB] 30 tablet 0     Sig: TAKE 1 TABLET DAILY FOR BLOOD PRESSURE OR KIDNEY // 1 HNUB NOJ 1 [facility] SIAB LO LUB RAUM.       Ace Inhibitors Refill Protocol Passed - 6/24/2020  2:07 PM        Passed - PCP or prescribing provider visit in past 12 months       Last office visit with prescriber/PCP: 7/29/2019 Lisseth Ho MD OR same dept: 7/29/2019 Lisseth Ho MD OR same specialty: 7/29/2019 Lisseth Ho MD  Last physical: Visit date not found Last MTM visit: Visit date not found   Next visit within 3 mo: Visit date not found  Next physical within 3 mo: Visit date not found  Prescriber OR PCP: Lisseth Ho MD  Last diagnosis associated with med order: 1. Severe episode of recurrent major depressive disorder, without psychotic features (H)  - sertraline (ZOLOFT) 50 MG tablet [Pharmacy Med Name: SERTRALINE HCL 50 MG TABS 50 TAB]; TAKE 1 TABLET DAILY FOR DEPRESSION // 1 HNUB NOJ 1 [facility]  Dispense: 30 tablet; Refill: 0    2. Essential hypertension with goal blood pressure less than 140/90  - lisinopriL (PRINIVIL,ZESTRIL) 20 MG tablet [Pharmacy Med Name: LISINOPRIL 20 MG TABS 20 TAB]; TAKE 1 TABLET DAILY FOR BLOOD PRESSURE OR KIDNEY // [facility].  Dispense: 30 tablet; Refill: 0    3. Type 2 diabetes mellitus with hyperglycemia, with long-term current use of insulin (H)  - metFORMIN (GLUCOPHAGE-XR) 500 MG 24 hr tablet [Pharmacy Med Name: METFORMIN HCL  MG TB2 500 TAB]; TAKE 4 TABLET DAILY FOR DIABETES // 1 HNUB NOJ 4 [facility] RAU NTSHAV  QAB ZIB  Dispense: 360 tablet; Refill: 0  - atorvastatin (LIPITOR) 80 MG tablet [Pharmacy Med Name: ATORVASTATIN 80 MG TABLET 80 TAB]; TAKE 1 TABLET BY MOUTH DAILY FOR CHOLESTEROL // IB HNUB NOJ 1 LUB [facility] NTSHAV MUAJ ROJ  Dispense: 90 tablet; Refill: 0    If protocol passes may refill for 12 months if within 3 months of last provider visit (or a total of 15 months).             Passed - Serum Potassium in past 12 months     Lab Results   Component Value Date    Potassium 4.1 07/29/2019             Passed - Blood pressure filed in past 12 months     BP Readings from Last 1 Encounters:   10/04/19 112/80             Passed - Serum Creatinine in past 12 months     Creatinine   Date Value Ref Range Status   07/29/2019 0.84 0.70 - 1.30 mg/dL Final                metFORMIN (GLUCOPHAGE-XR) 500 MG 24 hr tablet [Pharmacy Med Name: METFORMIN HCL  MG TB2 500 TAB] 360 tablet 0     Sig: TAKE 4 TABLET DAILY FOR DIABETES // 1 HNUB NOJ 4 LUB [facility] NTSV SALUD ZIB       Metformin Refill Protocol Failed - 6/24/2020  2:07 PM        Failed - Visit with PCP or prescribing provider visit in last 6 months or next 3 months     Last office visit with prescriber/PCP: Visit date not found OR same dept: 7/29/2019 Lisseth Ho MD OR same specialty: 7/29/2019 Lisseth Ho MD Last physical: Visit date not found Last MTM visit: Visit date not found         Next appt within 3 mo: Visit date not found  Next physical within 3 mo: Visit date not found  Prescriber OR PCP: Lisseth Ho MD  Last diagnosis associated with med order: 1. Severe episode of recurrent major depressive disorder, without psychotic features (H)  - sertraline (ZOLOFT) 50 MG tablet [Pharmacy Med Name: SERTRALINE HCL 50 MG TABS 50 TAB]; TAKE 1 TABLET DAILY FOR DEPRESSION // 1 HNUB NOJ 1 LUB \[facility]\"" CHASE [facility]  Dispense: 30 tablet; Refill: 0    2. Essential hypertension with goal blood pressure less than 140/90  - lisinopriL  (PRINIVIL,ZESTRIL) 20 MG tablet [Pharmacy Med Name: LISINOPRIL 20 MG TABS 20 TAB]; TAKE 1 TABLET DAILY FOR BLOOD PRESSURE OR KIDNEY // 1 [facility] NOJ [facility] RA[facility].  Dispense: 30 tablet; Refill: 0    3. Type 2 diabetes mellitus with hyperglycemia, with long-term current use of insulin (H)  - metFORMIN (GLUCOPHAGE-XR) 500 MG 24 hr tablet [Pharmacy Med Name: METFORMIN HCL  MG TB2 500 TAB]; TAKE 4 TABLET DAILY FOR DIABETES // 1 HNUB NOJ 4 [facility]  Dispense: 360 tablet; Refill: 0  - atorvastatin (LIPITOR) 80 MG tablet [Pharmacy Med Name: ATORVASTATIN 80 MG TABLET 80 TAB]; TAKE 1 TABLET BY MOUTH DAILY FOR CHOLESTEROL // IB HNUB NOJ 1 [facility] MUA[facility] ROJ  Dispense: 90 tablet; Refill: 0     If protocol passes may refill for 12 months if within 3 months of last provider visit (or a total of 15 months).           Passed - Blood pressure in last 12 months     BP Readings from Last 1 Encounters:   10/04/19 112/80             Passed - LFT or AST or ALT in last 12 months     Albumin   Date Value Ref Range Status   07/29/2019 4.3 3.5 - 5.0 g/dL Final     Bilirubin, Total   Date Value Ref Range Status   07/29/2019 0.7 0.0 - 1.0 mg/dL Final     Bilirubin, Direct   Date Value Ref Range Status   08/04/2016 0.2 <=0.5 mg/dL Final     Alkaline Phosphatase   Date Value Ref Range Status   07/29/2019 65 45 - 120 U/L Final     AST   Date Value Ref Range Status   07/29/2019 17 0 - 40 U/L Final     ALT   Date Value Ref Range Status   07/29/2019 29 0 - 45 U/L Final     Protein, Total   Date Value Ref Range Status   07/29/2019 7.3 6.0 - 8.0 g/dL Final                Passed - GFR or Serum Creatinine in last 6 months     GFR MDRD Non Af Amer   Date Value Ref Range Status   07/29/2019 >60 >60 mL/min/1.73m2 Final     GFR MDRD Af Amer   Date Value Ref Range Status   07/29/2019 >60 >60 mL/min/1.73m2 Final             Passed - A1C in last 6 months     Hemoglobin A1c   Date Value Ref Range Status    02/07/2020 8.2 (H) 3.5 - 6.0 % Final               Passed - Microalbumin in last year      Microalbumin, Random Urine   Date Value Ref Range Status   11/01/2019 13.82 (H) 0.00 - 1.99 mg/dL Final                     atorvastatin (LIPITOR) 80 MG tablet [Pharmacy Med Name: ATORVASTATIN 80 MG TABLET 80 TAB] 90 tablet 0     Sig: TAKE 1 TABLET BY MOUTH DAILY FOR CHOLESTEROL // IB HNUB NOJ 1 [facility]J       Statins Refill Protocol (Hmg CoA Reductase Inhibitors) Passed - 6/24/2020  2:07 PM        Passed - PCP or prescribing provider visit in past 12 months      Last office visit with prescriber/PCP: 7/29/2019 Lisseth Ho MD OR same dept: 7/29/2019 Lisseth Ho MD OR same specialty: 7/29/2019 Lisseth Ho MD  Last physical: Visit date not found Last MTM visit: Visit date not found   Next visit within 3 mo: Visit date not found  Next physical within 3 mo: Visit date not found  Prescriber OR PCP: Lisseth Ho MD  Last diagnosis associated with med order: 1. Severe episode of recurrent major depressive disorder, without psychotic features (H)  - sertraline (ZOLOFT) 50 MG tablet [Pharmacy Med Name: SERTRALINE HCL 50 MG TABS 50 TAB]; TAKE 1 TABLET DAILY FOR DEPRESSION // 1 HNUB NOJ 1 LUB [facility]ASE [facility]  Dispense: 30 tablet; Refill: 0    2. Essential hypertension with goal blood pressure less than 140/90  - lisinopriL (PRINIVIL,ZESTRIL) 20 MG tablet [Pharmacy Med Name: LISINOPRIL 20 MG TABS 20 TAB]; TAKE 1 TABLET DAILY FOR BLOOD PRESSURE OR KIDNEY // 1 HNUB NOJ 1 LUB PAB CHASE [facility] SIAB LO LUB RAUM.  Dispense: 30 tablet; Refill: 0    3. Type 2 diabetes mellitus with hyperglycemia, with long-term current use of insulin (H)  - metFORMIN (GLUCOPHAGE-XR) 500 MG 24 hr tablet [Pharmacy Med Name: METFORMIN HCL  MG TB2 500 TAB]; TAKE 4 TABLET DAILY FOR DIABETES // 1 HNUB NOJ 4 LUB [facility]AB [facility]  Dispense: 360 tablet; Refill: 0  - atorvastatin  (LIPITOR) 80 MG tablet [Pharmacy Med Name: ATORVASTATIN 80 MG TABLET 80 TAB]; TAKE 1 TABLET BY MOUTH DAILY FOR CHOLESTEROL // IB HNUB NOJ 1 LUB PAB CHASE NTSHAV MUAJ ROJ  Dispense: 90 tablet; Refill: 0    If protocol passes may refill for 12 months if within 3 months of last provider visit (or a total of 15 months).

## 2021-06-09 NOTE — TELEPHONE ENCOUNTER
RN cannot approve Refill Request    RN can NOT refill this medication Protocol failed and NO refill given.      Paula Chowdary, [facility] Triage/Med Refill 6/24/2020    Requested Prescriptions   Pending Prescriptions Disp Refills     metFORMIN (GLUCOPHAGE-XR) 500 MG 24 hr tablet [Pharmacy Med Name: METFORMIN HCL  MG TB2 500 TAB] 360 tablet 0     Sig: TAKE 4 TABLET DAILY FOR DIABETES // 1 HNUB NOJ 4 LUB [facility] QAB ZIB       Metformin Refill Protocol Failed - 6/24/2020  2:07 PM        Failed - Visit with PCP or prescribing provider visit in last 6 months or next 3 months     Last office visit with prescriber/PCP: Visit date not found OR same dept: 7/29/2019 Lisseth Ho MD OR same specialty: 7/29/2019 Lisseth Ho MD Last physical: Visit date not found Last MTM visit: Visit date not found         Next appt within 3 mo: Visit date not found  Next physical within 3 mo: Visit date not found  Prescriber OR PCP: Lisseth Ho MD  Last diagnosis associated with med order: 1. Severe episode of recurrent major depressive disorder, without psychotic features (H)  - sertraline (ZOLOFT) 50 MG tablet; TAKE 1 TABLET DAILY FOR DEPRESSION // 1 HNUB NOJ 1 LUB [facility]  Dispense: 30 tablet; Refill: 0    2. Essential hypertension with goal blood pressure less than 140/90  - lisinopriL (PRINIVIL,ZESTRIL) 20 MG tablet; TAKE 1 TABLET DAILY FOR BLOOD PRESSURE OR KIDNEY // 1 HNUB NOJ 1 [facility] SIAB  LUB RAUM.  Dispense: 30 tablet; Refill: 0    3. Type 2 diabetes mellitus with hyperglycemia, with long-term current use of insulin (H)  - metFORMIN (GLUCOPHAGE-XR) 500 MG 24 hr tablet [Pharmacy Med Name: METFORMIN HCL  MG TB2 500 TAB]; TAKE 4 TABLET DAILY FOR DIABETES // 1 HNUB NOJ 4 LUB [facility] QAB ZIB  Dispense: 360 tablet; Refill: 0  - atorvastatin (LIPITOR) 80 MG tablet; TAKE 1 TABLET BY MOUTH DAILY FOR CHOLESTEROL // IB HNUB NOJ 1 [facility]  MUAJ ROJ  Dispense: 30 tablet; Refill: 0     If protocol passes may refill for 12 months if within 3 months of last provider visit (or a total of 15 months).           Passed - Blood pressure in last 12 months     BP Readings from Last 1 Encounters:   10/04/19 112/80             Passed - LFT or AST or ALT in last 12 months     Albumin   Date Value Ref Range Status   07/29/2019 4.3 3.5 - 5.0 g/dL Final     Bilirubin, Total   Date Value Ref Range Status   07/29/2019 0.7 0.0 - 1.0 mg/dL Final     Bilirubin, Direct   Date Value Ref Range Status   08/04/2016 0.2 <=0.5 mg/dL Final     Alkaline Phosphatase   Date Value Ref Range Status   07/29/2019 65 45 - 120 U/L Final     AST   Date Value Ref Range Status   07/29/2019 17 0 - 40 U/L Final     ALT   Date Value Ref Range Status   07/29/2019 29 0 - 45 U/L Final     Protein, Total   Date Value Ref Range Status   07/29/2019 7.3 6.0 - 8.0 g/dL Final                Passed - GFR or Serum Creatinine in last 6 months     GFR MDRD Non Af Amer   Date Value Ref Range Status   07/29/2019 >60 >60 mL/min/1.73m2 Final     GFR MDRD Af Amer   Date Value Ref Range Status   07/29/2019 >60 >60 mL/min/1.73m2 Final             Passed - A1C in last 6 months     Hemoglobin A1c   Date Value Ref Range Status   02/07/2020 8.2 (H) 3.5 - 6.0 % Final               Passed - Microalbumin in last year      Microalbumin, Random Urine   Date Value Ref Range Status   11/01/2019 13.82 (H) 0.00 - 1.99 mg/dL Final                   Signed Prescriptions Disp Refills    sertraline (ZOLOFT) 50 MG tablet 30 tablet 0     Sig: TAKE 1 TABLET DAILY FOR DEPRESSION // 1 HNUB NOJ 1 LUB PAB [facility]       SSRI Refill Protocol  Passed - 6/24/2020  2:07 PM        Passed - PCP or prescribing provider visit in last year     Last office visit with prescriber/PCP: 7/29/2019 Lisseth Ho MD OR same dept: 7/29/2019 Lisseth Ho MD OR same specialty: 7/29/2019 Lisseth Ho MD  Last  physical: Visit date not found Last MTM visit: Visit date not found   Next visit within 3 mo: Visit date not found  Next physical within 3 mo: Visit date not found  Prescriber OR PCP: Lisseth Ho MD  Last diagnosis associated with med order: 1. Severe episode of recurrent major depressive disorder, without psychotic features (H)  - sertraline (ZOLOFT) 50 MG tablet; TAKE 1 TABLET DAILY FOR DEPRESSION // 1 HNUB NOJ 1 LUB PAB [facility]  Dispense: 30 tablet; Refill: 0    2. Essential hypertension with goal blood pressure less than 140/90  - lisinopriL (PRINIVIL,ZESTRIL) 20 MG tablet; TAKE 1 TABLET DAILY FOR BLOOD PRESSURE OR KIDNEY // 1 HNUB NOJ 1 LUB PAB CHASE [facility] SIAB LO LUB RAUM.  Dispense: 30 tablet; Refill: 0    3. Type 2 diabetes mellitus with hyperglycemia, with long-term current use of insulin (H)  - metFORMIN (GLUCOPHAGE-XR) 500 MG 24 hr tablet [Pharmacy Med Name: METFORMIN HCL  MG TB2 500 TAB]; TAKE 4 TABLET DAILY FOR DIABETES // 1 HNUB NOJ 4 LUB PAB CHASE [facility] QAB ZIB  Dispense: 360 tablet; Refill: 0  - atorvastatin (LIPITOR) 80 MG tablet; TAKE 1 TABLET BY MOUTH DAILY FOR CHOLESTEROL // IB HNUB NOJ 1 LUB PAB CHASE NT[facility]AV MUAJ ROJ  Dispense: 30 tablet; Refill: 0    If protocol passes may refill for 12 months if within 3 months of last provider visit (or a total of 15 months).               lisinopriL (PRINIVIL,ZESTRIL) 20 MG tablet 30 tablet 0     Sig: TAKE 1 TABLET DAILY FOR BLOOD PRESSURE OR KIDNEY // 1 HNUB NOJ 1 LUB PAB CHASE [facility] SIAB LO LUB RAUM.       Ace Inhibitors Refill Protocol Passed - 6/24/2020  2:07 PM        Passed - PCP or prescribing provider visit in past 12 months       Last office visit with prescriber/PCP: 7/29/2019 Lisseth Ho MD OR same dept: 7/29/2019 Lisseth Ho MD OR same specialty: 7/29/2019 Lisseth Ho MD  Last physical: Visit date not found Last MTM visit: Visit date not found   Next visit within 3 mo: Visit date not  found  Next physical within 3 mo: Visit date not found  Prescriber OR PCP: Lisseth Ho MD  Last diagnosis associated with med order: 1. Severe episode of recurrent major depressive disorder, without psychotic features (H)  - sertraline (ZOLOFT) 50 MG tablet; TAKE 1 TABLET DAILY FOR DEPRESSION // 1 HNUB NOJ 1 LUB PAB [facility]  Dispense: 30 tablet; Refill: 0    2. Essential hypertension with goal blood pressure less than 140/90  - lisinopriL (PRINIVIL,ZESTRIL) 20 MG tablet; TAKE 1 TABLET DAILY FOR BLOOD PRESSURE OR KIDNEY // 1 HNUB NOJ 1 LUB PAB CHASE \[facility]\""HAV SIAB LO LUB RAUM.  Dispense: 30 tablet; Refill: 0    3. Type 2 diabetes mellitus with hyperglycemia, with long-term current use of insulin (H)  - metFORMIN (GLUCOPHAGE-XR) 500 MG 24 hr tablet [Pharmacy Med Name: METFORMIN HCL  MG TB2 500 TAB]; TAKE 4 TABLET DAILY FOR DIABETES // 1 [facility]NUB NOJ 4 [facility]  Dispense: 360 tablet; Refill: 0  - atorvastatin (LIPITOR) 80 MG tablet; TAKE 1 TABLET BY MOUTH DAILY FOR CHOLESTEROL // IB HNUB NOJ 1 [facility] ROJ  Dispense: 30 tablet; Refill: 0    If protocol passes may refill for 12 months if within 3 months of last provider visit (or a total of 15 months).             Passed - Serum Potassium in past 12 months     Lab Results   Component Value Date    Potassium 4.1 07/29/2019             Passed - Blood pressure filed in past 12 months     BP Readings from Last 1 Encounters:   10/04/19 112/80             Passed - Serum Creatinine in past 12 months     Creatinine   Date Value Ref Range Status   07/29/2019 0.84 0.70 - 1.30 mg/dL Final               atorvastatin (LIPITOR) 80 MG tablet 30 tablet 0     Sig: TAKE 1 TABLET BY MOUTH DAILY FOR CHOLESTEROL // IB HNUB NOJ 1 [facility]       Statins Refill Protocol (Hmg CoA Reductase Inhibitors) Passed - 6/24/2020  2:07 PM        Passed - PCP or prescribing provider visit in past 12 months      Last office visit with  prescriber/PCP: 7/29/2019 Lisseth Ho MD OR same dept: 7/29/2019 Lisseth Ho MD OR same specialty: 7/29/2019 Lisseth Ho MD  Last physical: Visit date not found Last MTM visit: Visit date not found   Next visit within 3 mo: Visit date not found  Next physical within 3 mo: Visit date not found  Prescriber OR PCP: Lisseth Ho MD  Last diagnosis associated with med order: 1. Severe episode of recurrent major depressive disorder, without psychotic features (H)  - sertraline (ZOLOFT) 50 MG tablet; TAKE 1 TABLET DAILY FOR DEPRESSION // 1 HNUB NOJ 1 LUB PAB [facility]ASE [facility]  Dispense: 30 tablet; Refill: 0    2. Essential hypertension with goal blood pressure less than 140/90  - lisinopriL (PRINIVIL,ZESTRIL) 20 MG tablet; TAKE 1 TABLET DAILY FOR BLOOD PRESSURE OR KIDNEY // 1 HNUB NOJ 1 LUB PAB CHASE NT[facility] SIAB LO LUB RAUM.  Dispense: 30 tablet; Refill: 0    3. Type 2 diabetes mellitus with hyperglycemia, with long-term current use of insulin (H)  - metFORMIN (GLUCOPHAGE-XR) 500 MG 24 hr tablet [Pharmacy Med Name: METFORMIN HCL  MG TB2 500 TAB]; TAKE 4 TABLET DAILY FOR DIABETES // 1 HNUB NOJ 4 LUB PAB CHASE NT[facility]AV QAB ZIB  Dispense: 360 tablet; Refill: 0  - atorvastatin (LIPITOR) 80 MG tablet; TAKE 1 TABLET BY MOUTH DAILY FOR CHOLESTEROL // IB HNUB NOJ 1 LUB PAB CHASE NTSV MUAJ ROJ  Dispense: 30 tablet; Refill: 0    If protocol passes may refill for 12 months if within 3 months of last provider visit (or a total of 15 months).

## 2021-06-11 NOTE — TELEPHONE ENCOUNTER
Pt was told to schedule a 4 month follow up in 1/10/2021, but Dr. Ho schedule is not open in [facility] yet. Contacted pt @ [phone] informed pt clinic will call pt to help schedule appt with Dr. Ho when schedule is open at the end of December.

## 2021-06-11 NOTE — PROGRESS NOTES
OFFICE VISIT - FAMILY MEDICINE     ASSESSMENT AND PLAN     1. Type 2 diabetes mellitus with hyperglycemia, with long-term current use of insulin (H)  atorvastatin (LIPITOR) 80 MG tablet    dulaglutide (TRULICITY) 1.5 mg/0.5 mL PnIj    insulin glargine (BASAGLAR KWIKPEN) 100 unit/mL (3 mL) pen    metFORMIN (GLUCOPHAGE-XR) 500 MG 24 hr tablet    Glycosylated Hemoglobin A1c    Microalbumin, Random Urine    Comprehensive Metabolic Panel    generic lancets (FINGERSTIX LANCETS)   2. Essential hypertension with goal blood pressure less than 140/90  lisinopriL (PRINIVIL,ZESTRIL) 20 MG tablet   3. Severe episode of recurrent major depressive disorder, without psychotic features (H)  sertraline (ZOLOFT) 50 MG tablet   4. Mixed hyperlipidemia  Lipid Cascade    Custom LDL Cholesterol, Direct   Diabetes type 2, hypertension, depression and hyperlipidemia has been uncontrolled, patient ran out of medication about 4 months ago, did not ask for a refill.  A1c is up 11% today, plan is to refill all his medication, have him continue to work on healthy lifestyle changes, have our in-house pharmacist follow with the patient, and he will follow-up with me in 3 months, Department of [employer] form completed valid for 6 months only.    CHIEF COMPLAINT   Paperwork ([place] Dept of Public Safety - Insulin-Treated DM Report); Medication Refill (currently out of all prescribed medication, needs refills.); and Follow-up (medication)    HPI   Sanchez Muñoz is a 35 y.o. male.  Established at [facility] 9/2014.  Previously at [facility].  Past history of stroke with left hemiparesis, type 2 diabetes mellitus, hypertension, obesity and severe recurrent major depression followed by counselor.  Diabetes follow-up, unfortunately patient stopped taking all his medication about 4 months ago, did not call for a refill, stating that he was afraid of going out because of the ongoing COVID-19 breakout.  Has not been checking his blood sugar.   Last A1c about 4 months ago did show improvement at 8%, A1c is coming back today at 11%.Patient denies any excessive urination, denies any excessive thirst.  He does have the [place] Department of vehicle diabetes form to be completed.  He denies any worsening signs of depression.  But has not taken his medication for the past 4 months.      Review of Systems As per HPI, otherwise negative.    OBJECTIVE   /88 (Patient Site: Right Arm, Patient Position: Sitting, Cuff Size: Adult Regular)   Pulse 82   Temp 98.1  F (36.7  C) (Tympanic)   Wt 156 lb (70.8 kg)   SpO2 98%   BMI 28.53 kg/m    Physical Exam   Constitutional: He is oriented to person, place, and time. He appears well-developed and well-nourished.   HENT:   Head: Normocephalic and atraumatic.   Neck: Normal range of motion. Neck supple. No JVD present. No tracheal deviation present. No thyromegaly present.   Cardiovascular: Normal rate, regular rhythm, normal heart sounds and intact distal pulses. Exam reveals no gallop and no friction rub.   No murmur heard.  Pulmonary/Chest: Effort normal and breath sounds normal. No respiratory distress. He has no wheezes. He has no rales.   Musculoskeletal:         General: No tenderness or edema.   Lymphadenopathy:     He has no cervical adenopathy.   Neurological: He is alert and oriented to person, place, and time. Coordination normal.   Psychiatric: He has a normal mood and affect. Judgment and thought content normal.       PFSH     Family History   Problem Relation Age of Onset     No Medical Problems Mother      No Medical Problems Father      No Medical Problems Sister      No Medical Problems Brother      No Medical Problems Maternal Grandmother      No Medical Problems Maternal Grandfather      No Medical Problems Paternal Grandmother      No Medical Problems Paternal Grandfather      Social History     Socioeconomic History     Marital status: Single     Spouse name: Not on file     Number of  children: Not on file     Years of education: Not on file     Highest education level: Not on file   Occupational History     Not on file   Social Needs     Financial resource strain: Not on file     Food insecurity     Worry: Not on file     Inability: Not on file     Transportation needs     Medical: Not on file     Non-medical: Not on file   Tobacco Use     Smoking status: Never Smoker     Smokeless tobacco: Never Used   Substance and Sexual Activity     Alcohol use: No     Drug use: No     Sexual activity: Not on file   Lifestyle     Physical activity     Days per week: Not on file     Minutes per session: Not on file     Stress: Not on file   Relationships     Social connections     Talks on phone: Not on file     Gets together: Not on file     Attends Baptism service: Not on file     Active member of club or organization: Not on file     Attends meetings of clubs or organizations: Not on file     Relationship status: Not on file     Intimate partner violence     Fear of current or ex partner: Not on file     Emotionally abused: Not on file     Physically abused: Not on file     Forced sexual activity: Not on file   Other Topics Concern     Not on file   Social History Narrative     Not on file     Relevant history was reviewed with the patient today, unless noted in HPI, nothing is pertinent for this visit.  [facility]     Patient Active Problem List    Diagnosis Date Noted     Hemiplegia of left dominant side as late effect of cerebral infarction, unspecified hemiplegia type (H) 07/29/2019     Non compliance with medical treatment 01/23/2018     Stenosis of right carotid artery 08/26/2017     History of right carotid artery dissection 08/26/2017     Vitamin D deficiency disease 08/06/2016     Hypertriglyceridemia 08/06/2016     Failure to attend appointment 08/10/2015     Overview Note:     8/10/2015 (Dr. Grijalva)       Insomnia 04/27/2015     Lumbar radiculopathy 04/27/2015     Low back pain 04/27/2015     Neck  pain 04/27/2015     Tinea versicolor 04/27/2015     Stroke due to intracerebral hemorrhage, secondary to carotid artery dissection 04/27/2015     Overview Note:     Right cerebrovascular accident secondary to a right internal carotid artery dissection.  Surgical procedure recommended by Neurologist declined.       Mixed hyperlipidemia      Severe episode of recurrent major depressive disorder, without psychotic features (H)      Overview Note:     Replacement Utility updated for latest IMO load       Blurry Vision      Essential hypertension with goal blood pressure less than 140/90      Overview Note:     Replacement Utility updated for latest IMO load       Fatigue      Hemiparesis Of Left Side      Overview Note:     Replacement Utility updated for latest IMO load       History of ischemic stroke without residual deficits      Type 2 diabetes mellitus with hyperglycemia, with long-term current use of insulin (H)      Overview Note:     Diagnosed at time of stroke, 2013.       Vitamin D Deficiency      Overview Note:     Replacement Utility updated for latest IMO load       No past surgical history on file.    RESULTS/CONSULTS (Lab/Rad)     Recent Results (from the past 168 hour(s))   Glycosylated Hemoglobin A1c   Result Value Ref Range    Hemoglobin A1c 11.0 (H) <=5.6 %   Lipid Cascade   Result Value Ref Range    Cholesterol 319 (H) <=199 mg/dL    Triglycerides 916 (H) <=149 mg/dL    HDL Cholesterol 39 (L) >=40 mg/dL    LDL Calculated      Patient Fasting > 8hrs? No    Comprehensive Metabolic Panel   Result Value Ref Range    Sodium 139 136 - 145 mmol/L    Potassium 4.2 3.5 - 5.0 mmol/L    Chloride 102 98 - 107 mmol/L    CO2 26 22 - 31 mmol/L    Anion Gap, Calculation 11 5 - 18 mmol/L    Glucose 268 (H) 70 - 125 mg/dL    BUN 21 8 - 22 mg/dL    Creatinine 1.15 0.70 - 1.30 mg/dL    GFR MDRD Af Amer >60 >60 mL/min/1.73m2    GFR MDRD Non Af Amer >60 >60 mL/min/1.73m2    Bilirubin, Total 0.4 0.0 - 1.0 mg/dL    Calcium  "10.0 8.5 - 10.5 mg/dL    Protein, Total 7.9 6.0 - 8.0 g/dL    Albumin 4.4 3.5 - 5.0 g/dL    Alkaline Phosphatase 69 45 - 120 U/L    AST 14 0 - 40 U/L    ALT 31 0 - 45 U/L   Custom LDL Cholesterol, Direct   Result Value Ref Range    Direct  (H) <=129 mg/dl   Microalbumin, Random Urine   Result Value Ref Range    Microalbumin, Random Urine 35.24 (H) 0.00 - 1.99 mg/dL    Creatinine, Urine 134.8 mg/dL    Microalbumin/Creatinine Ratio Random Urine 261.4 (H) <=19.9 mg/g     No results found.  MEDICATIONS     Current Outpatient Medications on File Prior to Visit   Medication Sig Dispense Refill     ammonium lactate (LAC-HYDRIN) 12 % lotion        aspirin 81 mg chewable tablet TAKE 1 TABLET BY MOUTH DAILY FOR STROKE PREVENTION // IB HNUB NOJ 1 LUB PAB KOM NTSHAV KHIAV ZOO 90 tablet 0     aspirin 81 MG EC tablet Take 1 tablet (81 mg total) by mouth daily. To prevent stroke 90 tablet 0     blood glucose meter (ONETOUCH ULTRA2) Use to check blood sugars twice daily. OneTouch Ultra machine to go with OneTouch Ultra strips. 1 each 0     blood glucose test strips Use to check blood sugars twice daily. OneTouch Verio. Dispense brand per patient's insurance at pharmacy discretion. 100 strip 11     diclofenac sodium (VOLTAREN) 1 % Gel Use dosing card to apply 2 grams topically four times daily as needed 100 g 2     ergocalciferol (VITAMIN D2) 1,250 mcg (50,000 unit) capsule Take 1 capsule (50,000 Units total) by mouth once a week. For low Vitamin D 12 capsule 0     pen needle, diabetic (BD ULTRA-FINE NEHAL PEN NEEDLE) 32 gauge x 5/32\" Ndle Use one pen needle daily to inject Lantus (insulin) 100 each 3     selenium sulfide (SELSUN) 2.5 % lotion Leave on trunk and arms 10 min, then wash off.  Repeat 3-5 times per wek. 120 mL 1     No current facility-administered medications on file prior to visit.        HEALTH MAINTENANCE / SCREENING   PHQ-2 Total Score: 1 (9/23/2020  1:00 PM)  , PHQ-9 Total Score: 3 (9/23/2020  1:00 PM)  ,No " data recorded  Immunization History   Administered Date(s) Administered     Influenza,seasonal quad, PF 09/23/2014     Tdap 10/05/2016     Health Maintenance   Topic     DEPRESSION ACTION PLAN      HIV SCREENING      MEDICARE ANNUAL WELLNESS VISIT      PNEUMOCOCCAL IMMUNIZATION 19-64 MEDIUM RISK (1 of 1 - PPSV23)     HEPATITIS B VACCINES (2 of 2 - CpG risk 2-dose series)     DIABETIC FOOT EXAM      DIABETIC EYE EXAM      BMP      LIPID      INFLUENZA VACCINE RULE BASED (1)     MICROALBUMIN      A1C      ADVANCE CARE PLANNING      TD 18+ HE      TDAP ADULT ONE TIME DOSE        Lisseth Ho MD  Family Medicine, [facility]     This note was dictated using a voice recognition software.  Any grammatical or context distortion are unintentional and inherent to the software.

## 2021-06-11 NOTE — PROGRESS NOTES
MTM Follow Up Encounter  ASSESSMENT AND PLAN  Per [facility]: Has not refills on medicines since Oct 2016. Only prescription with active refills is Contour Next test strips from Oct 2016. No lancets.   Deactivated all prescriptions of old doses and medications on hold at [facility].   Appears that many prescriptions in the past were sent to [place], which patient no longer goes to.     Type 2 Diabetes:  poorly controlled. A1C not at goal of <7% - worsened since off medications. FBP likely not at goal  mg/dL. Checked blood sugar today 2.5 hours after eating and was 205. Provided him with a Shreya Contour EZ meter today and demonstrated how to use. Will ensure that [facility] gives him the correct strips/lancets. Recommended to check twice daily -- Fasting AM and 2 hours post prandial. Will restart metformin, but reviewed that he will need to titrate up by 1 tablet/week until 2000 mg/day to reduce the risk of GI side effects. Will restart Trulicity but at 0.75 mg dose to enhance tolerability. Will restart Lantus -- per last MTM note, Sanchez was on 30 units.   Will restart lisinopril due to past microalbuminuria. Will recheck BMP at follow up.   PLAN:   1. Restart Lantus 30 units once daily.   2. Restart Trulicity 0.75 mg once weekly.  3. Restart metformin  mg -- 1 tablet x1 week, then 2 tablets (1000 mg) x 1 week, then 3 tablets (1500 mg)  x 1 week, then 4 tablets (2000 mg) x 1 week, then continue   4. Restart lisinopril 10 mg daily  5. BMP at follow up  6. Given Shreya Contour Next EZ meter today.   7. Check blood sugars twice daily    Pain: Uncontrolled. Will start one medication at a time. Will restart gabapentin and titrate up in the future. Can consider restarting tramadol in the future if needed.   PLAN:   1. Restart gabapentin 300 mg TID    Insomnia: Uncontrolled. Likely due to pain. Rather than restarting trazodone today, will treat underlying pain and restart gabapentin. Can consider restarting trazodone in the future.      Stroke Syndrome: Will restart aspirin 81 mg today due to past stroke.   PLAN:   1. Restart aspirin 81 mg     Hyperlipidemia: Patient should be on a high-intensity statinper 2013 ACC/AHA Cholesterol Guidelines due to stroke history. Will restart at 80 mg atorvastatin once daily instead of 40 mg to offer more trig and LDL lowering. Will recheck his lipids since he was not fasting today.  Recommend stopping Lovaza since it may be increasing his LDL. Can consider adding fenofibrate in the future if fasting trigs >500  PLAN:   1. Start atorvastatin 80 mg daily.   2. Stop Lovaza.   3. Recheck lipids in 6-8 weeks     Depression: Uncontrolled. Will restart sertraline, but since he has been off for over a month, will restart at starting dose and likely titrate up. Could consider changing to duloxetine to help with pain, but he was not interested today. His depression symptoms may be signs of hyperglycemia, so will continue to monitor as his blood sugars improve.   PLAN:   1. Restart sertraline 50 mg daily    Vitamin D Deficiency: Last Vit D level was low. Since he has not maintained Vitamin D supplementation, will restart and recheck a Vitamin D level in 12 weeks.   PLAN:   1. Restart Vitamin D 50,000 IU weekly  2. Future Vitamin D level in 12 weeks    MTM FOLLOW UP  Friday June 30th **Bring medications and meter    SUBJECTIVE AND OBJECTIVE  Sanchez Muñoz is a 32 y.o. male here for a follow-up medication therapy management (MTM) appointment. A [employer]  joins us.     Medication Concern(s)/Question(s): Needs refills of all medications.     Medication Adherence: Has been off his medicines for 1.5 months. Feels tired, sleepy, headaches. Nephew helps set up his medications.     Type 2 Diabetes: Currently taking no medication - was on Trulicity 1.5 mg weekly, Lantus daily, and metformin  mg x4 (2000 mg) daily. Does not remember how much Lantus he was on.  He did like Trulicity.   Tests BG 0 times daily. Reports that  before they were checking BG BID before - fasting AM and right after eating breakfast. Gets the wrong strips from the pharmacy. Has no preference of meter type.   Last A1c checked today = 10%.   Hyperglycemia symptoms: denies polyuria. Reports headache and blurry vision, but he thinks that is his depression.   Microalbumin checked today, results pending. 8/4/16 ratio = 278.3. No longer on lisinopril 10 mg daily.     Pain: No longer taking gabapentin 300 mg TID or tramadol 50 mg. Is unsure which was more helpful. Describes burning in low back  and sharp pain under the feet and low back.     Insomnia: Reports that he is not sleeping good. Sleeps 4 hours a night. Hard to fall asleep due to pain. Able to stay asleep. Used to be on trazodone 100 mg.     Stroke Syndrome: Hx stroke 2013. Residual hemiparesis. No longer on aspirin 81 mg.      Hyperlipidemia: Currently taking no medication. No longer on atorvastatin 40 mg or Lovaza 4 grams daily. Lipid panel checked today, results pending. Of note, not fasting today. Last LDL 11/15/16 = 156 and trigs = 521    Depression: No longer taking sertraline 100 mg x2 (200 mg) daily. Reports that his mood is the same. Reports that his depression consists of headache, blurry vision. Sad and worries a lot. Thinks that sertraline was sometimes helpful. He would rather restart it rather than trying something new. Denies suicidal thoughts.     Vitamin D Deficiency: Taking no Vitamin D supplement. Was on 50,000 IU once daily, then stopped.   Vitamin D, Total (25-Hydroxy)   Date Value Ref Range Status   11/15/2016 14.2 (L) 30.0 - 80.0 ng/mL Final           Sanchez's medication list was reviewed with them, discussing reason for use, directions for use, and potential side effects of each medication as needed. Indication, safety, efficacy, and convenience was assessed for all medications addressed above.  No environmental factors were noted currently affecting patient.  This care plan was  communicated via EMR with his primary care provider, Lisseth Ho MD, who is the authorizing prescriber for this visit.  Direct supervision was available by either the patient's PCP or other available provider.    Time and complexity billing metrics are included in the docflowsheet linked to this visit    Time spent: 30 min  Gabby Gonsalez, Pharm.D., BCACP   MTM Pharmacist at [facility]

## 2021-06-11 NOTE — PROGRESS NOTES
MTM Follow Up Encounter  ASSESSMENT AND PLAN  Medication Adherence: Sanchez appropriately picked up all the medications that were prescribed to him from our 6/19/17 appt, but all of the bottles were full. He claims that he is taking older bottles at home, but we reviewed that some of the doses are different. It is very unclear if he is even taking those medications. I advised him to return in 1 week with the medications that he is taking at home + pill box.     Type 2 Diabetes:  poorly controlled. A1C not at goal of <7%. FBP not at goal  mg/dL. Recommended to try to check every day in the morning, fasting. Will leave his Lantus dose the same for now. Recommended to increase metformin to 2000 mg/day - increase to 1500 mg this week, then 2000 mg.   Will not check a BMP today since it is unclear whether he is taking lisinopril. Due for microalbuminuria with future labs.   PLAN:   1. Increase metformin to 1500 mg/day. After 1 week increase to 2000 mg/day.   2. Microalbuminuria with future labs    Stroke Syndrome: Continue aspirin 81 mg.     Pain: Unimproved. Will have him continue current dose. Reviewed adding a topical gel to help with pain, apply on lower back and shoulder. He was agreeable.   PLAN:   1. Start diclofenac 1% gel 2 grams on lower back and shoulders up to four times daily     Depression: Uncontrolled. Unclear whether he is taking. Reviewed that it can take up to 4 weeks to reach full benefit, likely will need dose increase in the future. Will discuss at future appointment.     Hyperlipidemia: Patient is on a high-intensity statin, which is appropriate per 2013 ACC/AHA Cholesterol Guidelines due to stroke history. Unclear what dose he is taking -- reviewed that the dose he has at home may be 40 mg and he is now on 80 mg. Will verify at follow up when he brings in all of his medicines at home. Will check cholesterol in the future as long as it is confirmed that he is adherent.      MTM FOLLOW UP  1  week    SUBJECTIVE AND OBJECTIVE  Sanchez Muñoz is a 32 y.o. male here for a follow-up medication therapy management (MTM) appointment. A [employer]  joins us today.     Medication Concern(s)/Question(s): None, feels the same.     Medication Adherence: No issues with getting his prescriptions from [facility]. Has a pill box at home. Brought all of his medication bottles today. Most of the bottles were completely full. Reports that he has been using the medications that he has at home, which he did not bring today.     Type 2 Diabetes: Brings Trulicity 0.75 mg weekly (on Fridays) and metformin XR today. Reports taking 500 mg BID. Did not bring Lantus today, but reports taking 30 units daily. All were restarted at last MTM appt.   Denies nausea or diarrhea.   Tests BG 0-1 times daily. Denies any issues with the meter that  I gave him at last MTM appt. Admits that he forgets to check. Blood sugars per glucometer (Contour Next EZ):   7/7 164 (2pm)  7/4 217 (11am)  6/29 235 (8am)  6/23 331 (2pm)  6/22 205 (940am)  6/221 176 (144pm), 218 (9am)  6/20 245 (7:42 am)  6/19 205 (11:41 am)   Last A1c checked 6/19/17 = 10%.   Hypoglycemia none   Microalbumin checked 8/4/16 ratio = 278.3. He brings lisinopril 10 mg daily, reports that he is taking but he is not confident since the bottle is full.     Stroke Syndrome: Hx stroke 2013. Residual hemiparesis. Reports that he is taking aspirin 81 mg, brings with him today. Denies significant bleeding.     Pain: Brings gabapentin 300 mg TID today. Reports that his pain is a little better, but not much. Less pain in his lower back. Sometimes tired. Pain is always there,sitting, standing, or walking. Pain mainly in right lower back and shoulder. He would be intersted in a cream. He would not like a change in his gabapentin.      Depression: Restarted sertraline 50 mg, brings with him today. His bottle is full. Reports that he is using old pills that he has at home. Reports some days are ok, but  some days are not. Angry, tired. No side effects.     Hyperlipidemia: Brought atorvastatin 80 mg with him today, bottle is full. Reports he is taking what he has at home. He used to be on 40 mg. Denies myalgias. Last lipids checked 6/19/17 - of note, trigs = 506        Sanchez's medication list was reviewed with them, discussing reason for use, directions for use, and potential side effects of each medication as needed. Indication, safety, efficacy, and convenience was assessed for all medications addressed above.  No environmental factors were noted currently affecting patient.  This care plan was communicated via EMR with his primary care provider, Lisseth Ho MD, who is the authorizing prescriber for this visit.  Direct supervision was available by either the patient's PCP or other available provider.    Time and complexity billing metrics are included in the docflowsheet linked to this visit    Time spent: 30 min  Gabby Gonsalez, Pharm.D., BCACP   MTM Pharmacist at [facility] and [facility]

## 2021-06-11 NOTE — PROGRESS NOTES
"  Assessment:     Diabetes mellitus Type II, under poor control.      Plan:     Discussed general issues about diabetes pathophysiology and management.  Counseling at today's visit: discussed the need for weight loss, focused on the need to adhere to the prescribed ADA diet and reminded to check sugars regularly and to bring readings in at the time of the next visit.  Educational material distributed.  Encouraged aerobic exercise.  Reminded to get yearly retinal exam.   Referral to in-house pharmacist  Subjective:       Sanchez Muñoz is an 32 y.o. male who presents for follow up of diabetes. Current symptoms include: hyperglycemia. Patient denies increased appetite, nausea and weight loss. Evaluation to date has included: fasting blood sugar, fasting lipid panel, hemoglobin A1C and microalbuminuria. Home sugars: patient does not check sugars. Current treatments: Not been compliant with medications was just recently seen a region for body aches, no record available for review.    The following portions of the patient's history were reviewed and updated as appropriate: allergies, current medications, past family history, past medical history, past social history, past surgical history and problem list.    Review of Systems  A 12 point comprehensive review of systems was negative except as noted.      Objective:      /72 (Patient Site: Right Arm)  Pulse 64  Temp 98  F (36.7  C) (Oral)   Resp 13  Ht 5' 1.8\" (1.57 m)  Wt 153 lb (69.4 kg)  BMI 28.17 kg/m2  General appearance: alert, appears stated age and cooperative  Neck: no adenopathy, no carotid bruit, no JVD, supple, symmetrical, trachea midline and thyroid not enlarged, symmetric, no tenderness/mass/nodules  Lungs: clear to auscultation bilaterally  Heart: regular rate and rhythm, S1, S2 normal, no murmur, click, rub or gallop  Neurologic: Left-sided hemiparesis from a previous stroke.    Laboratory:  No components found for: A1C   "

## 2021-06-12 NOTE — PROGRESS NOTES
MTM Follow Up Encounter  ASSESSMENT AND PLAN  Depression/Anxiety: Slightly improved per patient report, but it is unclear whether he is actually taking since only 1 tablet was missing -- the rest missing from the pill bottle was in the pill box. Will address further at follow up when it is more clear if he is taking sertraline consistently.     Pain: Slightly improved, but still uncontrolled. Reviewed that gabapentin is written for TID. Will increase to BID -- added second capsule to evening. Encouraged him to try diclofenac gel.   PLAN:   1. Increase gabapentin to 300 mg BID    Type 2 Diabetes:  control uncertain. A1C was not at goal of <7%. FBP unknown if at goal  mg/dL. Appears to be tolerating metformin. Reviewed that his lancets have refills and that he needs to ask the pharmacy to prepare them for him. Recommended to start checking blood sugars. I provided him with a few lancets today. Will not adjust his insulin without BG values. Future a1c and microalbuminuria in Sept.   PLAN:   1. Begin checking blood sugars      Vitamin D Deficiency: Appears that he may have been taking Vitamin D for 2 weeks, it is unclear. Will not recheck Vitamin D until it is clear that he has been taking Vitamin D consistently.   1. Future Vitamin D level    MTM FOLLOW UP  1 month    SUBJECTIVE AND OBJECTIVE  Sanchez Muñoz is a 32 y.o. male here for a follow-up medication therapy management (MTM) appointment. A [employer]  joins us today.     Medication Concern(s)/Question(s): None. No issues with medications and feels the same.     Medication Adherence: Brings all of his medications and 1 week pill box, which is set up and full except for this morning is empty. His nephew sets up his medications. Reports that he could not find his old pill bottles, so he is now taking the new pill bottles.    today is in charge of picking up his medications.     Depression/Anxiety: Brings sertraline 50 mg QAM with him today. Reports  that he is not overthingking or worrying as much. #23 tablets were remaining in the bottle.      Pain: Brings gabapentin 300 mg with him -- in AM slot of pill box only (written as TID). Reports that it helps a little with his lower back. Not helpful for his feet, still having pain in his feet during the day. No drowsiness.   Picked up diclofenac 1% gel but he has not used it yet.     Type 2 Diabetes: Brings metformin  mg with him today. In pill box is 2 tablets BID. Reports that he is on 30 units of Lantus and that he used his last Trulicity 0.75 mg injection yesterday.  Tests BG 0-1 times daily. Reports that he ran out of lancets so he has not been checking.  states she forgot to  his lancets.  Blood sugars per glucometer: One values since last MT visit and it was on 7/19 at 9:10 am = 228.   Denies diarrhea now, did in the beginning when started metformin.   Last A1c checked 6/19/17 = 10%.   Hypoglycemia none that he is aware of. Feels no different.   Microalbumin checked 8/14/16    Vitamin D Deficiency: Brings Vitamin D 50,000 IU weekly with him today. Two capsules remaining. Took one dose this morning.   Vitamin D, Total (25-Hydroxy)   Date Value Ref Range Status   11/15/2016 14.2 (L) 30.0 - 80.0 ng/mL Final               Sanchez's medication list was reviewed with them, discussing reason for use, directions for use, and potential side effects of each medication as needed. Indication, safety, efficacy, and convenience was assessed for all medications addressed above.  No environmental factors were noted currently affecting patient.  This care plan was communicated via EMR with his primary care provider, Lisseth Ho MD, who is the authorizing prescriber for this visit.  Direct supervision was available by either the patient's PCP or other available provider.    Time and complexity billing metrics are included in the docflowsheet linked to this visit    Time spent: 20 min  Gabby ESTEVEZ  Sunshine, Pharm.D., BCACP   MTM Pharmacist at [facility]

## 2021-06-12 NOTE — PROGRESS NOTES
"OFFICE VISIT - FAMILY MEDICINE     ASSESSMENT AND PLAN     1. Right-sided Bell's palsy  Ambulatory referral to Ophthalmology   Continue current treatment, referral to see ophthalmologist for an eye exam, follow-up if not improving after treatment otherwise in mid October for diabetes check    CHIEF COMPLAINT   Hospital Visit Follow Up ([facility], 08/25 - 08/27 - DX: STENOSIS, R CAROTID ARTERY)    MORIAH Muñoz is a 32 y.o. male.  Updated [facility]  Established at [facility] 9/2014.  Previously at [facility].  Past history of stroke with left hemiparesis, type 2 diabetes mellitus, hypertension, obesity and severe recurrent major depression followed by counselor.  Was admitted in the hospital with right facial droop, stroke was ruled out, he was diagnosis of Bell palsy, is currently taking Valtrex on taper prednisone, does have a history of stroke with left-sided hemiparesis.  Denies any significant side effects from the medication.  Hospital record was reviewed medication were reconciled and reviewed today.  Sugar appears stable, and patient has been compliant with therapy.      Review of Systems As per HPI, otherwise negative.    OBJECTIVE   /74 (Patient Site: Right Arm)  Pulse 64  Temp 98  F (36.7  C) (Oral)   Resp 13  Ht 5' 2\" (1.575 m)  Wt 154 lb (69.9 kg)  BMI 28.17 kg/m2  Physical Exam   Constitutional: He appears well-developed and well-nourished.   HENT:   Head: Normocephalic and atraumatic.   Neck: Normal range of motion. Neck supple. No JVD present. No tracheal deviation present. No thyromegaly present.   Cardiovascular: Normal rate, regular rhythm, normal heart sounds and intact distal pulses.  Exam reveals no gallop and no friction rub.    No murmur heard.  Pulmonary/Chest: Effort normal and breath sounds normal. No respiratory distress. He has no wheezes. He has no rales.   Musculoskeletal: He exhibits no edema or tenderness.   Lymphadenopathy:     He has no cervical adenopathy. "   Neurological: A cranial nerve deficit (Right 7th nerve palsy) is present.   Right Facial Bell palsy (New); left hemiparesis (chronic)   Psychiatric: He has a normal mood and affect. Judgment and thought content normal.       PFSH     Family History   Problem Relation Age of Onset     No Medical Problems Mother      No Medical Problems Father      No Medical Problems Sister      No Medical Problems Brother      No Medical Problems Maternal Grandmother      No Medical Problems Maternal Grandfather      No Medical Problems Paternal Grandmother      No Medical Problems Paternal Grandfather      Social History     Social History     Marital status: Single     Spouse name: N/A     Number of children: N/A     Years of education: N/A     Occupational History     Not on file.     Social History Main Topics     Smoking status: Never Smoker     Smokeless tobacco: Never Used     Alcohol use No     Drug use: No     Sexual activity: Not on file     Other Topics Concern     Not on file     Social History Narrative       [facility]     Patient Active Problem List    Diagnosis Date Noted     Stenosis of right carotid artery 08/26/2017     Bell's palsy 08/26/2017     History of right carotid artery dissection 08/26/2017     Vitamin D deficiency disease 08/06/2016     Hypertriglyceridemia 08/06/2016     Failure to attend appointment 08/10/2015     Overview Note:     8/10/2015 (Dr. Grijalva)       Insomnia 04/27/2015     Lumbar radiculopathy 04/27/2015     Low back pain 04/27/2015     Neck pain 04/27/2015     Tinea versicolor 04/27/2015     Stroke due to intracerebral hemorrhage, secondary to carotid artery dissection 04/27/2015     Overview Note:     Right cerebrovascular accident secondary to a right internal carotid artery dissection.  Surgical procedure recommended by Neurologist declined.       Mixed hyperlipidemia      Severe episode of recurrent major depressive disorder, without psychotic features      Overview Note:      Replacement Utility updated for latest IMO load       Blurry Vision      Essential hypertension with goal blood pressure less than 140/90      Overview Note:     Replacement Utility updated for latest IMO load       Fatigue      Hemiparesis Of Left Side      Overview Note:     Replacement Utility updated for latest IMO load       History of ischemic stroke without residual deficits      Type 2 diabetes mellitus with hyperglycemia, with long-term current use of insulin      Overview Note:     Diagnosed at time of stroke, 2013.       Vitamin D Deficiency      Overview Note:     Replacement Utility updated for latest IMO load       No past surgical history on file.    RESULTS/CONSULTS (Lab/Rad)     Recent Results (from the past 168 hour(s))   Basic Metabolic Panel   Result Value Ref Range    Sodium 140 136 - 145 mmol/L    Potassium 3.7 3.5 - 5.0 mmol/L    Chloride 100 98 - 107 mmol/L    CO2 28 22 - 31 mmol/L    Anion Gap, Calculation 12 5 - 18 mmol/L    Glucose 214 (H) 70 - 125 mg/dL    Calcium 10.2 8.5 - 10.5 mg/dL    BUN 13 8 - 22 mg/dL    Creatinine 0.98 0.70 - 1.30 mg/dL    GFR MDRD Af Amer >60 >60 mL/min/1.73m2    GFR MDRD Non Af Amer >60 >60 mL/min/1.73m2   Magnesium   Result Value Ref Range    Magnesium 2.1 1.8 - 2.6 mg/dL   Lyme Antibody Cascade   Result Value Ref Range    Lyme Antibody Cascade 0.15 <0.90 Index Value   HM1 (CBC with Diff)   Result Value Ref Range    WBC 8.9 4.0 - 11.0 thou/uL    RBC 5.26 4.40 - 6.20 mill/uL    Hemoglobin 16.0 14.0 - 18.0 g/dL    Hematocrit 45.5 40.0 - 54.0 %    MCV 87 80 - 100 fL    MCH 30.4 27.0 - 34.0 pg    MCHC 35.2 32.0 - 36.0 g/dL    RDW 11.1 11.0 - 14.5 %    Platelets 297 140 - 440 thou/uL    MPV 8.8 8.5 - 12.5 fL    Neutrophils % 52 50 - 70 %    Lymphocytes % 37 20 - 40 %    Monocytes % 7 2 - 10 %    Eosinophils % 3 0 - 6 %    Basophils % 1 0 - 2 %    Neutrophils Absolute 4.5 2.0 - 7.7 thou/uL    Lymphocytes Absolute 3.3 0.8 - 4.4 thou/uL    Monocytes Absolute 0.7 0.0 -  0.9 thou/uL    Eosinophils Absolute 0.3 0.0 - 0.4 thou/uL    Basophils Absolute 0.1 0.0 - 0.2 thou/uL   Anaplasma Smears, Blood   Result Value Ref Range    Ehrlichia Smear No Morulae No Morulae   POCT Glucose   Result Value Ref Range    Glucose,  mg/dL   POCT Glucose   Result Value Ref Range    Glucose,  mg/dL   POCT Glucose   Result Value Ref Range    Glucose,  mg/dL   Basic metabolic panel   Result Value Ref Range    Sodium 136 136 - 145 mmol/L    Potassium 4.2 3.5 - 5.0 mmol/L    Chloride 100 98 - 107 mmol/L    CO2 24 22 - 31 mmol/L    Anion Gap, Calculation 12 5 - 18 mmol/L    Glucose 331 (H) 70 - 125 mg/dL    Calcium 9.6 8.5 - 10.5 mg/dL    BUN 12 8 - 22 mg/dL    Creatinine 0.94 0.70 - 1.30 mg/dL    GFR MDRD Af Amer >60 >60 mL/min/1.73m2    GFR MDRD Non Af Amer >60 >60 mL/min/1.73m2   Hemogram with PLT   Result Value Ref Range    WBC 10.6 4.0 - 11.0 thou/uL    RBC 5.08 4.40 - 6.20 mill/uL    Hemoglobin 15.3 14.0 - 18.0 g/dL    Hematocrit 43.5 40.0 - 54.0 %    MCV 86 80 - 100 fL    MCH 30.1 27.0 - 34.0 pg    MCHC 35.2 32.0 - 36.0 g/dL    RDW 11.4 11.0 - 14.5 %    Platelets 287 140 - 440 thou/uL    MPV 8.9 8.5 - 12.5 fL   Magnesium   Result Value Ref Range    Magnesium 2.0 1.8 - 2.6 mg/dL   Phosphorus   Result Value Ref Range    Phosphorus 3.4 2.5 - 4.5 mg/dL   POCT Glucose   Result Value Ref Range    Glucose,  mg/dL   POCT Glucose   Result Value Ref Range    Glucose,  mg/dL   POCT Glucose   Result Value Ref Range    Glucose,  mg/dL   POCT Glucose   Result Value Ref Range    Glucose,  mg/dL   POCT Glucose   Result Value Ref Range    Glucose,  mg/dL   POCT Glucose   Result Value Ref Range    Glucose,  mg/dL   POCT Glucose   Result Value Ref Range    Glucose,  mg/dL   POCT Glucose   Result Value Ref Range    Glucose,  mg/dL     Mra Head Without Contrast    Addendum Date: 8/26/2017    10/7/2014 and 8/30/2013 exams suggest narrowing of left  ICA terminus, along with narrowing in similar location on the right side. Opacification of both A1 segments on the prior exams is likely supplied from the right side with some collateral flow at A1 segments as well. Given bilateral narrowing at ICA terminus collaterals of A1 segments; consider moyamoya disease as etiology of patient's ICA narrowings. Caliber of cervical right ICA could be diminished secondarily due to diminished flow.    Result Date: 8/26/2017  1. HEAD MRA WITHOUT IV CONTRAST 2. NECK MRA WITHOUT IV CONTRAST 8/26/2017 12:25 AM INDICATION: Abnormal findings on head MRI, diminished caliber of right ICA TECHNIQUE: 1. Head MRA without intravenous contrast. 2. Neck MRA without intravenous contrast. COMPARISON: 10/7/2014 FINDINGS: HEAD MRA: Redemonstration of marked narrowing at the junction of right ICA terminus and the right M1. Diminished caliber of visualized right ICA, similar to prior. Asymmetry in the appearance of MCA branches with less numerous and less opacified branches  on the right, similar to prior. Diminished opacification of A2 segments and its branches bilaterally, new since prior. This is due to poor flow through A1 segments bilaterally, similar to more prominent compared with prior. It is due to combination of above described right ICA narrowing and either hypoplasia or narrowing of left A1. There may be narrowing at the left ICA terminus, better appreciated on 10/7/2014 and 8/30/2015 exams. No aneurysm or AVM. Normal posterior circulation. NECK MRA: Redemonstration of diffuse narrowing of the right ICA throughout the neck, most prominent in its proximal aspect. There is slightly progressed since the prior, measured at approximately 70% just distal to the bifurcation compared with 60-70% on  the prior. Narrowing determined utilizing the NASCET criteria. No hemodynamically significant narrowing proximal left ICA, utilizing the NASCET criteria. Normal vertebral arteries bilaterally.      CONCLUSION: HEAD MRA: 1.  Redemonstration of marked narrowing at the junction of right ICA terminus and the right M1. Diminished caliber of visualized right ICA, similar to prior. 2.  Asymmetry in the appearance of MCA branches with less numerous and less opacified branches on the right, similar to prior. 3.  Diminished opacification of A2 segments and its branches bilaterally, new since prior. It is due to combination of above described right ICA narrowing and either hypoplasia or narrowing of left A1.  There may be narrowing at the left ICA terminus, better appreciated on prior exams. NECK MRA: 1.  Redemonstration of diffuse narrowing of the right ICA throughout the neck, most prominent in its proximal aspect. 2.  Narrowing proximally is slightly progressed since the prior, measured at approximately 70% just distal to the bifurcation compared with 60-70% on the prior. 3.  Normal left ICA and both vertebral arteries.    Mra Neck Without Contrast    Addendum Date: 8/26/2017    10/7/2014 and 8/30/2013 exams suggest narrowing of left ICA terminus, along with narrowing in similar location on the right side. Opacification of both A1 segments on the prior exams is likely supplied from the right side with some collateral flow at A1 segments as well. Given bilateral narrowing at ICA terminus collaterals of A1 segments; consider moyamoya disease as etiology of patient's ICA narrowings. Caliber of cervical right ICA could be diminished secondarily due to diminished flow.    Result Date: 8/26/2017  1. HEAD MRA WITHOUT IV CONTRAST 2. NECK MRA WITHOUT IV CONTRAST 8/26/2017 12:25 AM INDICATION: Abnormal findings on head MRI, diminished caliber of right ICA TECHNIQUE: 1. Head MRA without intravenous contrast. 2. Neck MRA without intravenous contrast. COMPARISON: 10/7/2014 FINDINGS: HEAD MRA: Redemonstration of marked narrowing at the junction of right ICA terminus and the right M1. Diminished caliber of visualized right ICA,  similar to prior. Asymmetry in the appearance of MCA branches with less numerous and less opacified branches  on the right, similar to prior. Diminished opacification of A2 segments and its branches bilaterally, new since prior. This is due to poor flow through A1 segments bilaterally, similar to more prominent compared with prior. It is due to combination of above described right ICA narrowing and either hypoplasia or narrowing of left A1. There may be narrowing at the left ICA terminus, better appreciated on 10/7/2014 and 8/30/2015 exams. No aneurysm or AVM. Normal posterior circulation. NECK MRA: Redemonstration of diffuse narrowing of the right ICA throughout the neck, most prominent in its proximal aspect. There is slightly progressed since the prior, measured at approximately 70% just distal to the bifurcation compared with 60-70% on  the prior. Narrowing determined utilizing the NASCET criteria. No hemodynamically significant narrowing proximal left ICA, utilizing the NASCET criteria. Normal vertebral arteries bilaterally.     CONCLUSION: HEAD MRA: 1.  Redemonstration of marked narrowing at the junction of right ICA terminus and the right M1. Diminished caliber of visualized right ICA, similar to prior. 2.  Asymmetry in the appearance of MCA branches with less numerous and less opacified branches on the right, similar to prior. 3.  Diminished opacification of A2 segments and its branches bilaterally, new since prior. It is due to combination of above described right ICA narrowing and either hypoplasia or narrowing of left A1.  There may be narrowing at the left ICA terminus, better appreciated on prior exams. NECK MRA: 1.  Redemonstration of diffuse narrowing of the right ICA throughout the neck, most prominent in its proximal aspect. 2.  Narrowing proximally is slightly progressed since the prior, measured at approximately 70% just distal to the bifurcation compared with 60-70% on the prior. 3.  Normal left  ICA and both vertebral arteries.    Mr Brain With Without Contrast    Result Date: 8/25/2017  HEAD MRI WITHOUT AND WITH IV CONTRAST 8/25/2017 10:14 PM INDICATION: bells palsey protocol.  r sided symptoms, unclear if was also having left facial numbness (language barrier) TECHNIQUE: Head MRI without and with intravenous contrast. CONTRAST: 7ml gadavist COMPARISON: 8/30/2015 FINDINGS: No acute infarct, mass, mass effect, or acute hemorrhage. No definite pathological enhancement in either IAC. Diminished caliber of flow void within the right ICA an very subtle hyperemia throughout branches of right MCA distribution. Increased  signal within few branches of MCA distribution on FLAIR images; suggestive of slow flow. Findings are new since the prior exam. MRA head and neck recommended. Remaining flow voids are normal. Negative orbits. Mild mucosal thickening paranasal sinuses, clear mastoid air cells. Mildly enlarged lymph nodes upper neck bilaterally, nonspecific.     CONCLUSION: 1.  No acute infarct, mass, mass effect, or acute hemorrhage. 2.  No definite pathological enhancement in either IAC. 3.  Diminished caliber of flow void within the right ICA an very subtle hyperemia throughout branches of right MCA distribution. Increased signal within few branches of MCA distribution on FLAIR images; suggestive of slow flow. Findings are new since the  prior exam. MRA head and neck recommended.     MEDICATIONS     Current Outpatient Prescriptions on File Prior to Visit   Medication Sig Dispense Refill     aspirin 81 MG EC tablet Take 81 mg by mouth daily.       atorvastatin (LIPITOR) 80 MG tablet Take 1 tablet (80 mg total) by mouth daily. 90 tablet 3     blood-glucose meter (CONTOUR NEXT EZ METER) Misc Use to test blood sugar 4 times per day 1 each 0     cane Abbie Use daily.  #1.  DX: Status post stroke, left hemiparesis 1 each 0     carboxymethylcellulose (REFRESH LIQUIGEL) 1 % ophthalmic solution One drop in the right eye  "every hour until facial weakness goes away or until evaluation by PCP 30 mL 12     CONTOUR NEXT STRIPS strips Use to test blood sugar 4 times per day 125 each 11     diclofenac sodium (VOLTAREN) 1 % Gel Use dosing card to apply 2 grams topically four times daily as needed (Patient taking differently: Apply 1 application topically 4 (four) times a day as needed. Use dosing card to apply 2 grams topically four times daily as needed) 100 g 0     gabapentin (NEURONTIN) 300 MG capsule Take 300 mg by mouth 3 (three) times a day.       generic lancets (MICROLET LANCET) Use to test blood sugar 2 times per day. 100 each 11     insulin glargine (LANTUS; BASAGLAR) 100 unit/mL (3 mL) pen Inject 35 Units under the skin at bedtime. 15 mL 5     liraglutide (VICTOZA) 0.6 mg/0.1 mL (18 mg/3 mL) PnIj injection Inject 0.2 mL (1.2 mg total) under the skin daily. 0.6 mg for 1 week, then 1.2 mg. With or without food. 6 mL 0     lisinopril (PRINIVIL,ZESTRIL) 10 MG tablet Take 1 tablet (10 mg total) by mouth daily. 90 tablet 0     metFORMIN (GLUCOPHAGE-XR) 500 MG 24 hr tablet Take 2 tablets (1,000 mg total) by mouth 2 (two) times a day. 120 tablet 11     pen needle, diabetic (BD ULTRA-FINE NEHAL PEN NEEDLES) 32 gauge x 5/32\" Ndle Use one pen daily to inject Lantus (insulin) and Victoza 100 each 3     predniSONE (DELTASONE) 20 MG tablet Take 3 tabs daily for 5 days, then 2 tabs daily for 3 days, then 1 tab daily for 3 days, then half tab daily for 3 days, then stop. 26 tablet 0     selenium sulfide (SELSUN) 2.5 % lotion Leave on trunk and arms 10 min, then wash off.  Repeat 3-5 times per wek. (Patient taking differently: Apply 1 application topically see administration instructions. Leave on trunk and arms 10 min, then wash off.  Repeat 3-5 times per wek.) 120 mL 1     sertraline (ZOLOFT) 50 MG tablet Take 1 tablet (50 mg total) by mouth daily. 30 tablet 11     valACYclovir (VALTREX) 1000 MG tablet Take 1 tablet (1,000 mg total) by mouth " every 8 (eight) hours for 6 days. 17 tablet 0     No current facility-administered medications on file prior to visit.        HEALTH MAINTENANCE / SCREENING   PHQ-2 Total Score: 6 (8/31/2017 10:00 AM), PHQ-9 Total Score: 25 (8/31/2017 10:00 AM),HAI-7 Total: 20 (8/31/2017 10:00 AM)  Immunization History   Administered Date(s) Administered     Influenza,seasonal quad, PF 09/23/2014     Tdap 10/05/2016     Health Maintenance   Topic     DIABETES OPHTHALMOLOGY EXAM      DIABETES URINE MICROALBUMIN      DEPRESSION FOLLOW UP      DIABETES HEMOGLOBIN A1C      DIABETES FOLLOW-UP      DIABETES FOOT EXAM      ADVANCE DIRECTIVES DISCUSSED WITH PATIENT      TDAP ADULT ONE TIME DOSE      TD 18+ HE      INFLUENZA VACCINE RULE BASED        Lisseth Ho MD  Family Medicine, [facility]   This transcription uses voice recognition software, which may contain typographical errors.

## 2021-06-12 NOTE — PROGRESS NOTES
OFFICE VISIT - FAMILY MEDICINE     ASSESSMENT AND PLAN     1. Type 2 diabetes mellitus with hyperglycemia, with long-term current use of insulin (H)  blood glucose meter (ONETOUCH ULTRA2 METER)    blood glucose test strips    generic lancets (FINGERSTIX LANCETS)   Uncontrolled diabetes type 2, continue current medication, I will call the glucometer and supply to check his blood sugar as directed  to [facility] because apparently they will cover part B of Medicare,he will continue to  his medication at [facility].  Importance of tight blood sugar control discussed, medical opinion form stating that patient is able to drive a motor vehicle safely was completed.  Will reassess in January.    CHIEF COMPLAINT   Request For Letter    HPI   Sanchez Muñoz is a 35 y.o. male.  Established at [facility] 9/2014.  Previously at [facility].  Past history of stroke with left hemiparesis, type 2 diabetes mellitus, hypertension, obesity and severe recurrent major depression followed by counselor.    Diabetes type 2 has been poorly controlled because he was not taking his medication up until a few months ago, A1c was elevated, medication were resumed.  He is here today to have a motor vehicle forms opinion sign.  He is able to drive, denies any loss of consciousness while driving.  He was not able to  his glucometer at the pharmacy, stating that insurance did not cover it.  I called the pharmacy and the pharmacy told me that the only cover pod D of Medicare, patient will have to  a glucometer at a different pharmacy covering part B        Review of Systems As per HPI, otherwise negative.    OBJECTIVE   BP (!) 133/91 (Patient Site: Left Arm, Patient Position: Sitting, Cuff Size: Adult Regular)   Pulse 93   Resp 14   Wt 157 lb (71.2 kg)   SpO2 99%   BMI 29.18 kg/m    Physical Exam   Constitutional: He is oriented to person, place, and time. He appears well-developed and well-nourished.   HENT:   Head:  Normocephalic and atraumatic.   Neck: Neck supple.   Cardiovascular: Normal rate, regular rhythm, normal heart sounds and intact distal pulses. Exam reveals no gallop and no friction rub.   No murmur heard.  Pulmonary/Chest: Effort normal and breath sounds normal. No respiratory distress. He has no wheezes. He has no rales.   Musculoskeletal:         General: No tenderness or edema.   Neurological: He is alert and oriented to person, place, and time. Coordination normal.   Psychiatric: He has a normal mood and affect. Judgment and thought content normal.       PFS     Family History   Problem Relation Age of Onset     No Medical Problems Mother      No Medical Problems Father      No Medical Problems Sister      No Medical Problems Brother      No Medical Problems Maternal Grandmother      No Medical Problems Maternal Grandfather      No Medical Problems Paternal Grandmother      No Medical Problems Paternal Grandfather      Social History     Socioeconomic History     Marital status: Single     Spouse name: Not on file     Number of children: Not on file     Years of education: Not on file     Highest education level: Not on file   Occupational History     Not on file   Social Needs     Financial resource strain: Not on file     Food insecurity     Worry: Not on file     Inability: Not on file     Transportation needs     Medical: Not on file     Non-medical: Not on file   Tobacco Use     Smoking status: Never Smoker     Smokeless tobacco: Never Used   Substance and Sexual Activity     Alcohol use: No     Drug use: No     Sexual activity: Not on file   Lifestyle     Physical activity     Days per week: Not on file     Minutes per session: Not on file     Stress: Not on file   Relationships     Social connections     Talks on phone: Not on file     Gets together: Not on file     Attends Hoahaoism service: Not on file     Active member of club or organization: Not on file     Attends meetings of clubs or  organizations: Not on file     Relationship status: Not on file     Intimate partner violence     Fear of current or ex partner: Not on file     Emotionally abused: Not on file     Physically abused: Not on file     Forced sexual activity: Not on file   Other Topics Concern     Not on file   Social History Narrative     Not on file     Relevant history was reviewed with the patient today, unless noted in HPI, nothing is pertinent for this visit.  [facility]     Patient Active Problem List    Diagnosis Date Noted     Hemiplegia of left dominant side as late effect of cerebral infarction, unspecified hemiplegia type (H) 07/29/2019     Non compliance with medical treatment 01/23/2018     Stenosis of right carotid artery 08/26/2017     History of right carotid artery dissection 08/26/2017     Vitamin D deficiency disease 08/06/2016     Hypertriglyceridemia 08/06/2016     Failure to attend appointment 08/10/2015     Overview Note:     8/10/2015 (Dr. Grijalva)       Insomnia 04/27/2015     Lumbar radiculopathy 04/27/2015     Low back pain 04/27/2015     Neck pain 04/27/2015     Tinea versicolor 04/27/2015     Stroke due to intracerebral hemorrhage, secondary to carotid artery dissection 04/27/2015     Overview Note:     Right cerebrovascular accident secondary to a right internal carotid artery dissection.  Surgical procedure recommended by Neurologist declined.       Mixed hyperlipidemia      Severe episode of recurrent major depressive disorder, without psychotic features (H)      Overview Note:     Replacement Utility updated for latest IMO load       Blurry Vision      Essential hypertension with goal blood pressure less than 140/90      Overview Note:     Replacement Utility updated for latest IMO load       Fatigue      Hemiparesis Of Left Side      Overview Note:     Replacement Utility updated for latest IMO load       History of ischemic stroke without residual deficits      Type 2 diabetes mellitus with hyperglycemia,  "with long-term current use of insulin (H)      Overview Note:     Diagnosed at time of stroke, 2013.       Vitamin D Deficiency      Overview Note:     Replacement Utility updated for latest IMO load       No past surgical history on file.    RESULTS/CONSULTS (Lab/Rad)   No results found for this or any previous visit (from the past 168 hour(s)).  No results found.  MEDICATIONS     Current Outpatient Medications on File Prior to Visit   Medication Sig Dispense Refill     ammonium lactate (LAC-HYDRIN) 12 % lotion        aspirin 81 mg chewable tablet TAKE 1 TABLET BY MOUTH DAILY FOR STROKE PREVENTION // IB HNUB NOJ 1 LUB PAB KOM NTSHAV KHIAV ZOO 90 tablet 0     aspirin 81 MG EC tablet Take 1 tablet (81 mg total) by mouth daily. To prevent stroke 90 tablet 0     atorvastatin (LIPITOR) 80 MG tablet TAKE 1 TABLET BY MOUTH DAILY FOR CHOLESTEROL // IB HNUB NOJ 1 LUB PAB CHASE NTSHAV MUAJ ROJ 30 tablet 6     blood-glucose meter (ONETOUCH VERIO IQ METER) Misc 1 device to check BS 1 each 11     diclofenac sodium (VOLTAREN) 1 % Gel Use dosing card to apply 2 grams topically four times daily as needed 100 g 2     dulaglutide (TRULICITY) 1.5 mg/0.5 mL PnIj Inject 1.5 mg under the skin every 7 days. For blood sugars 6 mL 6     ergocalciferol (VITAMIN D2) 1,250 mcg (50,000 unit) capsule Take 1 capsule (50,000 Units total) by mouth once a week. For low Vitamin D 12 capsule 0     insulin glargine (BASAGLAR KWIKPEN) 100 unit/mL (3 mL) pen Inject 37 Units under the skin daily. Please write directions in Hmong 45 mL 3     lisinopriL (PRINIVIL,ZESTRIL) 20 MG tablet TAKE 1 TABLET DAILY FOR BLOOD PRESSURE OR KIDNEY // 1 HNUB NOJ 1 LUB PAB CHASE NTSHAV SIAB LO LUB RAUM. 30 tablet 2     metFORMIN (GLUCOPHAGE) 1000 MG tablet Take 1 tablet (1,000 mg total) by mouth 2 (two) times a day with meals. 180 tablet 3     pen needle, diabetic (BD ULTRA-FINE NEHAL PEN NEEDLE) 32 gauge x 5/32\" Ndle Use one pen needle daily to inject Lantus (insulin) 100 each " 3     selenium sulfide (SELSUN) 2.5 % lotion Leave on trunk and arms 10 min, then wash off.  Repeat 3-5 times per wek. 120 mL 1     sertraline (ZOLOFT) 50 MG tablet TAKE 1 TABLET DAILY FOR DEPRESSION // 1 HNUB NOJ 1 LUB PAB CHASE NYUAB SIAB 30 tablet 6     [DISCONTINUED] blood glucose meter (ONETOUCH ULTRA2) Use to check blood sugars twice daily. OneTouch Ultra machine to go with OneTouch Ultra strips. 1 each 0     [DISCONTINUED] blood glucose test strips Use to check blood sugars twice daily. OneTouch Verio. Dispense brand per patient's insurance at pharmacy discretion. 100 strip 11     [DISCONTINUED] generic lancets (FINGERSTIX LANCETS) Use to check blood sugars twice daily. OneTouch Delica. Dispense brand per patient's insurance at pharmacy discretion. 100 each 11     No current facility-administered medications on file prior to visit.        HEALTH MAINTENANCE / SCREENING   PHQ-2 Total Score: 1 (9/23/2020  1:00 PM)  , PHQ-9 Total Score: 3 (9/23/2020  1:00 PM)  ,No data recorded  Immunization History   Administered Date(s) Administered     Influenza,seasonal quad, PF 09/23/2014     Tdap 10/05/2016     Health Maintenance   Topic     DEPRESSION ACTION PLAN      HEPATITIS C SCREENING      Pneumococcal Vaccine: Pediatrics (0 to 5 Years) and At-Risk Patients (6 to 64 Years) (1 of 1 - PPSV23)     HIV SCREENING      MEDICARE ANNUAL WELLNESS VISIT      HEPATITIS B VACCINES (2 of 2 - CpG risk 2-dose series)     DIABETIC FOOT EXAM      DIABETIC EYE EXAM      INFLUENZA VACCINE RULE BASED (1)     A1C      BMP      LIPID      MICROALBUMIN      ADVANCE CARE PLANNING      TD 18+ HE      TDAP ADULT ONE TIME DOSE        Lisseth Ho MD  Family Medicine, [facility]     This note was dictated using a voice recognition software.  Any grammatical or context distortion are unintentional and inherent to the software.

## 2021-06-12 NOTE — PROGRESS NOTES
OFFICE VISIT - FAMILY MEDICINE     ASSESSMENT AND PLAN     1. Type 2 diabetes mellitus with hyperglycemia, with long-term current use of insulin (H)  generic lancets (FINGERSTIX LANCETS)    blood glucose test strips    blood-glucose meter (ONETOUCH VERIO IQ METER) Misc   2. Tinea versicolor  selenium sulfide (SELSUN) 2.5 % lotion   Uncontrolled diabetes type 2, patient did receive a form from the DMV asking for medical record, I am not exactly sure what type of record they would like, but I did write a letter explaining the current situation and if they need more information to contact me.  Tinea versicolor, treatment discussed, selenium sulfide lotion sent.    CHIEF COMPLAINT   Needs documentation for [place] dept of Public Safety (needs documentation pertaining to general medical condition so he will be able to drive)    MORIAH Muñoz is a 35 y.o. male.  Established at [facility] 9/2014.  Previously at [facility].  Past history of stroke with left hemiparesis, type 2 diabetes mellitus, hypertension, obesity and severe recurrent major depression followed by counselor.    Patient is here today with a form from the department of motor vehicle asking him to submit medical documentation.  He does have a history of stroke, diabetes type 2 poorly controlled, hypertension, hyperlipidemia.  Patient was seen last month, at that time he reported that he was not taking his medication, indeed his A1c came back very high at 11%.  At that time we decided about resuming this medication and recheck in 3 months.  He was given a 6-month insulin form for DMV.  He also mention a rash on his back and arm area that seems to be recurrent, appear like small skin discoloration of about a few millimeters size.  Has been treated in the past for tinea versicolor and responded well would like the same cream    Review of Systems As per [facility], otherwise negative.    OBJECTIVE   /88 (Patient Site: Right Arm, Patient Position:  "Sitting, Cuff Size: Adult Large)   Pulse 94   Ht 5' 1.5\" (1.562 m)   Wt 158 lb (71.7 kg)   BMI 29.37 kg/m    Physical Exam   Constitutional: He is oriented to person, place, and time. He appears well-developed and well-nourished.   HENT:   Head: Normocephalic and atraumatic.   Neck: Normal range of motion. Neck supple. No JVD present. No tracheal deviation present. No thyromegaly present.   Cardiovascular: Normal rate, regular rhythm, normal heart sounds and intact distal pulses. Exam reveals no gallop and no friction rub.   No murmur heard.  Pulmonary/Chest: Effort normal and breath sounds normal. No respiratory distress. He has no wheezes. He has no rales.   Musculoskeletal:         General: No tenderness or edema.   Lymphadenopathy:     He has no cervical adenopathy.   Neurological: He is alert and oriented to person, place, and time. Coordination normal.   Skin:   Small patches of skin discoloration in the upper back area   Psychiatric: He has a normal mood and affect. Judgment and thought content normal.       PFSH     Family History   Problem Relation Age of Onset     No Medical Problems Mother      No Medical Problems Father      No Medical Problems Sister      No Medical Problems Brother      No Medical Problems Maternal Grandmother      No Medical Problems Maternal Grandfather      No Medical Problems Paternal Grandmother      No Medical Problems Paternal Grandfather      Social History     Socioeconomic History     Marital status: Single     Spouse name: Not on file     Number of children: Not on file     Years of education: Not on file     Highest education level: Not on file   Occupational History     Not on file   Social Needs     Financial resource strain: Not on file     Food insecurity     Worry: Not on file     Inability: Not on file     Transportation needs     Medical: Not on file     Non-medical: Not on file   Tobacco Use     Smoking status: Never Smoker     Smokeless tobacco: Never Used "   Substance and Sexual Activity     Alcohol use: No     Drug use: No     Sexual activity: Not on file   Lifestyle     Physical activity     Days per week: Not on file     Minutes per session: Not on file     Stress: Not on file   Relationships     Social connections     Talks on phone: Not on file     Gets together: Not on file     Attends Jewish service: Not on file     Active member of club or organization: Not on file     Attends meetings of clubs or organizations: Not on file     Relationship status: Not on file     Intimate partner violence     Fear of current or ex partner: Not on file     Emotionally abused: Not on file     Physically abused: Not on file     Forced sexual activity: Not on file   Other Topics Concern     Not on file   Social History Narrative     Not on file     Relevant history was reviewed with the patient today, unless noted in HPI, nothing is pertinent for this visit.  [facility]     Patient Active Problem List    Diagnosis Date Noted     Hemiplegia of left dominant side as late effect of cerebral infarction, unspecified hemiplegia type (H) 07/29/2019     Non compliance with medical treatment 01/23/2018     Stenosis of right carotid artery 08/26/2017     History of right carotid artery dissection 08/26/2017     Vitamin D deficiency disease 08/06/2016     Hypertriglyceridemia 08/06/2016     Failure to attend appointment 08/10/2015     Overview Note:     8/10/2015 (Dr. Grijalva)       Insomnia 04/27/2015     Lumbar radiculopathy 04/27/2015     Low back pain 04/27/2015     Neck pain 04/27/2015     Tinea versicolor 04/27/2015     Stroke due to intracerebral hemorrhage, secondary to carotid artery dissection 04/27/2015     Overview Note:     Right cerebrovascular accident secondary to a right internal carotid artery dissection.  Surgical procedure recommended by Neurologist declined.       Mixed hyperlipidemia      Severe episode of recurrent major depressive disorder, without psychotic features  (H)      Overview Note:     Replacement Utility updated for latest IMO load       Blurry Vision      Essential hypertension with goal blood pressure less than 140/90      Overview Note:     Replacement Utility updated for latest IMO load       Fatigue      Hemiparesis Of Left Side      Overview Note:     Replacement Utility updated for latest IMO load       History of ischemic stroke without residual deficits      Type 2 diabetes mellitus with hyperglycemia, with long-term current use of insulin (H)      Overview Note:     Diagnosed at time of stroke, 2013.       Vitamin D Deficiency      Overview Note:     Replacement Utility updated for latest IMO load       No past surgical history on file.    RESULTS/CONSULTS (Lab/Rad)   No results found for this or any previous visit (from the past 168 hour(s)).  No results found.  MEDICATIONS     Current Outpatient Medications on File Prior to Visit   Medication Sig Dispense Refill     ammonium lactate (LAC-HYDRIN) 12 % lotion        aspirin 81 mg chewable tablet TAKE 1 TABLET BY MOUTH DAILY FOR STROKE PREVENTION // IB HNUB NOJ 1 LUB PAB KOM NTSHAV KHIAV ZOO 90 tablet 0     aspirin 81 MG EC tablet Take 1 tablet (81 mg total) by mouth daily. To prevent stroke 90 tablet 0     atorvastatin (LIPITOR) 80 MG tablet TAKE 1 TABLET BY MOUTH DAILY FOR CHOLESTEROL // IB HNUB NOJ 1 LUB PAB CHASE NTSHAV MUAJ ROJ 30 tablet 6     blood glucose meter (ONETOUCH ULTRA2) Use to check blood sugars twice daily. OneTouch Ultra machine to go with OneTouch Ultra strips. 1 each 0     diclofenac sodium (VOLTAREN) 1 % Gel Use dosing card to apply 2 grams topically four times daily as needed 100 g 2     dulaglutide (TRULICITY) 1.5 mg/0.5 mL PnIj Inject 1.5 mg under the skin every 7 days. For blood sugars 6 mL 6     ergocalciferol (VITAMIN D2) 1,250 mcg (50,000 unit) capsule Take 1 capsule (50,000 Units total) by mouth once a week. For low Vitamin D 12 capsule 0     insulin glargine (BASAGLAR KWIKPEN) 100  "unit/mL (3 mL) pen Inject 37 Units under the skin daily. Please write directions in Hmong 45 mL 3     lisinopriL (PRINIVIL,ZESTRIL) 20 MG tablet TAKE 1 TABLET DAILY FOR BLOOD PRESSURE OR KIDNEY // 1 HNUB NOJ 1 LUB PAB CHASE NTSHAV SIAB LO LUB RAUM. 30 tablet 2     metFORMIN (GLUCOPHAGE-XR) 500 MG 24 hr tablet Take 4 tablets (2,000 mg total) by mouth daily. For diabetes 360 tablet 3     pen needle, diabetic (BD ULTRA-FINE NEHAL PEN NEEDLE) 32 gauge x 5/32\" Ndle Use one pen needle daily to inject Lantus (insulin) 100 each 3     sertraline (ZOLOFT) 50 MG tablet TAKE 1 TABLET DAILY FOR DEPRESSION // 1 HNUB NOJ 1 LUB PAB CHASE NYUAB SIAB 30 tablet 6     [DISCONTINUED] blood glucose test strips Use to check blood sugars twice daily. OneTouch Verio. Dispense brand per patient's insurance at pharmacy discretion. 100 strip 11     [DISCONTINUED] generic lancets (FINGERSTIX LANCETS) Use to check blood sugars twice daily. OneTouch Delica. Dispense brand per patient's insurance at pharmacy discretion. 100 each 11     [DISCONTINUED] selenium sulfide (SELSUN) 2.5 % lotion Leave on trunk and arms 10 min, then wash off.  Repeat 3-5 times per wek. 120 mL 1     No current facility-administered medications on file prior to visit.        HEALTH MAINTENANCE / SCREENING   PHQ-2 Total Score: 1 (9/23/2020  1:00 PM)  , PHQ-9 Total Score: 3 (9/23/2020  1:00 PM)  ,No data recorded  Immunization History   Administered Date(s) Administered     Influenza,seasonal quad, PF 09/23/2014     Tdap 10/05/2016     Health Maintenance   Topic     DEPRESSION ACTION PLAN      Pneumococcal Vaccine: Pediatrics (0 to 5 Years) and At-Risk Patients (6 to 64 Years) (1 of 1 - PPSV23)     HIV SCREENING      MEDICARE ANNUAL WELLNESS VISIT      HEPATITIS B VACCINES (2 of 2 - CpG risk 2-dose series)     DIABETIC FOOT EXAM      DIABETIC EYE EXAM      INFLUENZA VACCINE RULE BASED (1)     A1C      BMP      LIPID      MICROALBUMIN      ADVANCE CARE PLANNING      TD 18+ HE      " TDAP ADULT ONE TIME DOSE        Lisseth Ho MD  Family Medicine, [facility]     This note was dictated using a voice recognition software.  Any grammatical or context distortion are unintentional and inherent to the software.

## 2021-06-12 NOTE — PROGRESS NOTES
MTM Follow Up Encounter  ASSESSMENT AND PLAN  Right-sided Bell's Palsy: Encouraged him to start the medications prescribed to him -- resent electronically. I wrote his prednisone taper schedule on a calender to avoid dosing confusion. Recommended to start tomorrow morning, to avoid insomnia if he starts tonight. Recommended to take with food. Discussed that his blood sugars will worsen while on prednisone. Reviewed that he only needs to wear the patch at night, but to use artifical tears every hour during the day to avoid corneal dryness.   Discussed that neurology reviewed that typically recovery is complete/near-complete at 70-75% of individuals. Individuals with diabetes may have less complete recovery, which is why it is so important for him to have his blood sugars controlled.    PLAN:   1. Start valacyclovir, prednisone   2. Remove patch during the day, wear at night. During the day use artificial tears every hour as needed    Type 2 Diabetes:  control uncertain and poorly controlled. Adherence is questionable. A1C is not at goal of <7%. FBP likely not at goal  mg/dL. Will have him increase to 35 units Lantus per discharge recommendation. Reviewed that Trulicity is no longer covered per insurance and will switch him to Victoza. Demonstrated use and dose -- will have him start over since he has been off Trulicity for 2 weeks.   Labs are not up to date -- will check microalbuminuria with A1c at MT follow up.    PLAN:   1. Start Victoza 0.6 mg x 1 week, then increase to 1.2 mg.   2. A1c and microalbuminuria at follow up.   3. Increase Lantus to 35 units daily    Pain: Stable. Patient is a poor historian, but will have him continue current gabapentin regimen for now and reassess once Bell's palsy is improved.     MTM FOLLOW UP  3 weeks    SUBJECTIVE AND OBJECTIVE  Sanchez Muñoz is a 32 y.o. male here for a follow-up medication therapy management (MTM) appointment.     Medication Adherence: Brought his medication  "bottles and pill box, which his nephew sets up.     Right-sided Bell's Palsy: Admitted 8/25-8/27. Sent home with valacyclovir 1000 mg q8h, prednisone taper, and artificial tears. He has not picked up any of the medications. His right eye is still covered with the patch. Reports that it is a little weird when he talks. Eye that is covered like it is \"pulling.\"    Type 2 Diabetes: Currently taking Lantus 30 units and metformin  mg x2 (1000 mg) BID. Is aware that the hospital discharged him on 35 units Lantus. Ran out of Trulicity 2 weeks ago. Did not bring glucometer today. Reports blood sugars: \"Sometimes 100, sometimes 200.\" 170 in the morning. Lowest was 160.   Last A1c checked 6/19/17 = 10%.   Hypoglycemia none  Microalbumin checked 8/4/16    Pain: At last MTM appt, gabapentin was increased from 300 mg QAM to BID. He is not sure if increase in gabapentin was helpful. Reports pain has been overall the same.           Sanchez's medication list was reviewed with them, discussing reason for use, directions for use, and potential side effects of each medication as needed. Indication, safety, efficacy, and convenience was assessed for all medications addressed above.  No environmental factors were noted currently affecting patient.  This care plan was communicated via EMR with his primary care provider, Lisseth Ho MD, who is the authorizing prescriber for this visit.  Direct supervision was available by either the patient's PCP or other available provider.    Time and complexity billing metrics are included in the docflowsheet linked to this visit    Time spent: 30 min  Gabby Gonsalez, Pharm.D., BCACP   MTM Pharmacist at [facility]    "

## 2021-06-13 NOTE — PROGRESS NOTES
"OFFICE VISIT - FAMILY MEDICINE     ASSESSMENT AND PLAN     1. Type 2 diabetes mellitus with hyperglycemia, with long-term current use of insulin (H)  Glycosylated Hemoglobin A1c    blood-glucose meter Misc    blood glucose test strips    generic lancets (MICROLET LANCET)   2. Encounter for HCV screening test for low risk patient  Hepatitis C Antibody (Anti-HCV)   3. Screening for HIV without presence of risk factors  HIV Antigen/Antibody Screening Cascade   Diabetes type 2 has been improving, A1c is down to 7.2%, encourage patient to continue current management, continue physical activity healthy lifestyle changes, DMV form was completed.  Follow-up in about 3 to 4 months    CHIEF COMPLAINT   Paperwork    HPI   Sanchez Muñoz is a 35 y.o. male.  Established at [facility] 9/2014.  Previously at [facility].  Past history of stroke with left hemiparesis, type 2 diabetes mellitus, hypertension, obesity and severe recurrent major depression followed by counselor.  Report form from the [place] [employer] to be completed today.  He has diabetes type 2, A1c done last September was 11%, has not been checking his blood sugar because glucometer was not covered by insurance.  But he stating that he is compliant with medication,A1c is coming back at 7.2% today.  Denies any hypoglycemic episode is planning to follow-up with ophthalmologist soon.        Review of Systems As per HPI, otherwise negative.    OBJECTIVE   /84 (Patient Site: Right Arm, Patient Position: Sitting, Cuff Size: Adult Regular)   Pulse 75   Temp 97.7  F (36.5  C) (Temporal)   Resp 16   Ht 5' 2\" (1.575 m)   Wt 154 lb (69.9 kg)   BMI 28.17 kg/m    Physical Exam   Constitutional: He is oriented to person, place, and time. He appears well-developed and well-nourished.   HENT:   Head: Normocephalic and atraumatic.   Neck: Normal range of motion. Neck supple. No JVD present. No tracheal deviation present. No thyromegaly " present.   Cardiovascular: Normal rate, regular rhythm, normal heart sounds and intact distal pulses. Exam reveals no gallop and no friction rub.   No murmur heard.  Pulmonary/Chest: Effort normal and breath sounds normal. No respiratory distress. He has no wheezes. He has no rales.   Musculoskeletal:         General: No tenderness or edema.   Lymphadenopathy:     He has no cervical adenopathy.   Neurological: He is alert and oriented to person, place, and time. Coordination normal.   Psychiatric: He has a normal mood and affect. Judgment and thought content normal.       [facility]     Family History   Problem Relation Age of Onset     No Medical Problems Mother      No Medical Problems Father      No Medical Problems Sister      No Medical Problems Brother      No Medical Problems Maternal Grandmother      No Medical Problems Maternal Grandfather      No Medical Problems Paternal Grandmother      No Medical Problems Paternal Grandfather      Social History     Socioeconomic History     Marital status: Single     Spouse name: Not on file     Number of children: Not on file     Years of education: Not on file     Highest education level: Not on file   Occupational History     Not on file   Social Needs     Financial resource strain: Not on file     Food insecurity     Worry: Not on file     Inability: Not on file     Transportation needs     Medical: Not on file     Non-medical: Not on file   Tobacco Use     Smoking status: Never Smoker     Smokeless tobacco: Never Used   Substance and Sexual Activity     Alcohol use: No     Drug use: No     Sexual activity: Not on file   Lifestyle     Physical activity     Days per week: Not on file     Minutes per session: Not on file     Stress: Not on file   Relationships     Social connections     Talks on phone: Not on file     Gets together: Not on file     Attends Scientologist service: Not on file     Active member of club or organization: Not on file     Attends meetings of clubs or  organizations: Not on file     Relationship status: Not on file     Intimate partner violence     Fear of current or ex partner: Not on file     Emotionally abused: Not on file     Physically abused: Not on file     Forced sexual activity: Not on file   Other Topics Concern     Not on file   Social History Narrative     Not on file     Relevant history was reviewed with the patient today, unless noted in HPI, nothing is pertinent for this visit.  [facility]     Patient Active Problem List    Diagnosis Date Noted     Hemiplegia of left dominant side as late effect of cerebral infarction, unspecified hemiplegia type (H) 07/29/2019     Non compliance with medical treatment 01/23/2018     Stenosis of right carotid artery 08/26/2017     History of right carotid artery dissection 08/26/2017     Vitamin D deficiency disease 08/06/2016     Hypertriglyceridemia 08/06/2016     Failure to attend appointment 08/10/2015     Overview Note:     8/10/2015 (Dr. Grijalva)       Insomnia 04/27/2015     Lumbar radiculopathy 04/27/2015     Low back pain 04/27/2015     Neck pain 04/27/2015     Tinea versicolor 04/27/2015     Stroke due to intracerebral hemorrhage, secondary to carotid artery dissection 04/27/2015     Overview Note:     Right cerebrovascular accident secondary to a right internal carotid artery dissection.  Surgical procedure recommended by Neurologist declined.       Mixed hyperlipidemia      Severe episode of recurrent major depressive disorder, without psychotic features (H)      Overview Note:     Replacement Utility updated for latest IMO load       Blurry Vision      Essential hypertension with goal blood pressure less than 140/90      Overview Note:     Replacement Utility updated for latest IMO load       Fatigue      Hemiparesis Of Left Side      Overview Note:     Replacement Utility updated for latest IMO load       History of ischemic stroke without residual deficits      Type 2 diabetes mellitus with hyperglycemia,  "with long-term current use of insulin (H)      Overview Note:     Diagnosed at time of stroke, 2013.       Vitamin D Deficiency      Overview Note:     Replacement Utility updated for latest IMO load       No past surgical history on file.    RESULTS/CONSULTS (Lab/Rad)     Recent Results (from the past 168 hour(s))   Glycosylated Hemoglobin A1c   Result Value Ref Range    Hemoglobin A1c 7.2 (H) <=5.6 %   HIV Antigen/Antibody Screening Cascade   Result Value Ref Range    HIV Antigen / Antibody Negative Negative   Hepatitis C Antibody (Anti-HCV)   Result Value Ref Range    Hepatitis C Ab Negative Negative     No results found.  MEDICATIONS     Current Outpatient Medications on File Prior to Visit   Medication Sig Dispense Refill     ammonium lactate (LAC-HYDRIN) 12 % lotion        aspirin 81 MG EC tablet Take 1 tablet (81 mg total) by mouth daily. To prevent stroke 90 tablet 0     atorvastatin (LIPITOR) 80 MG tablet TAKE 1 TABLET BY MOUTH DAILY FOR CHOLESTEROL // IB HNUB NOJ 1 LUB PAB CHASE NTSHAV MUAJ ROJ 30 tablet 6     dulaglutide (TRULICITY) 1.5 mg/0.5 mL PnIj Inject 1.5 mg under the skin every 7 days. For blood sugars 6 mL 6     ergocalciferol (VITAMIN D2) 1,250 mcg (50,000 unit) capsule Take 1 capsule (50,000 Units total) by mouth once a week. For low Vitamin D 12 capsule 0     generic lancets (FINGERSTIX LANCETS) Use to check blood sugars twice daily. OneTouch Delica. Dispense brand per patient's insurance at pharmacy discretion. 100 each 11     insulin glargine (BASAGLAR KWIKPEN) 100 unit/mL (3 mL) pen Inject 37 Units under the skin daily. Please write directions in Hmong 45 mL 3     metFORMIN (GLUCOPHAGE) 1000 MG tablet Take 1 tablet (1,000 mg total) by mouth 2 (two) times a day with meals. 180 tablet 3     pen needle, diabetic (BD ULTRA-FINE NEHAL PEN NEEDLE) 32 gauge x 5/32\" Ndle Use one pen needle daily to inject Lantus (insulin) 100 each 3     selenium sulfide (SELSUN) 2.5 % lotion Leave on trunk and arms 10 " min, then wash off.  Repeat 3-5 times per wek. 120 mL 1     sertraline (ZOLOFT) 50 MG tablet TAKE 1 TABLET DAILY FOR DEPRESSION // 1 HNUB NOJ 1 LUB PAB CHASE NYUAB SIAB 30 tablet 6     [DISCONTINUED] blood-glucose meter (ONETOUCH VERIO IQ METER) Misc 1 device to check BS 1 each 11     diclofenac sodium (VOLTAREN) 1 % Gel Use dosing card to apply 2 grams topically four times daily as needed 100 g 2     lisinopriL (PRINIVIL,ZESTRIL) 20 MG tablet TAKE 1 TABLET DAILY FOR BLOOD PRESSURE OR KIDNEY // 1 HNUB NOJ 1 LUB PAB CHASE NTSHAV SIAB LO LUB RAUM. 30 tablet 2     [DISCONTINUED] aspirin 81 mg chewable tablet TAKE 1 TABLET BY MOUTH DAILY FOR STROKE PREVENTION // IB HNUB NOJ 1 LUB PAB KOM NTSHAV KHIAV ZOO 90 tablet 0     [DISCONTINUED] blood glucose meter (ONETOUCH ULTRA2 METER) Use to check blood sugars twice daily. OneTouch Ultra machine to go with OneTouch Ultra strips. 1 each prn     [DISCONTINUED] blood glucose test strips Use to check blood sugars twice daily. OneTouch Verio. Dispense brand per patient's insurance at pharmacy discretion. 100 strip 11     No current facility-administered medications on file prior to visit.        HEALTH MAINTENANCE / SCREENING   PHQ-2 Total Score: 1 (9/23/2020  1:00 PM)  , PHQ-9 Total Score: 3 (9/23/2020  1:00 PM)  ,No data recorded  Immunization History   Administered Date(s) Administered     Influenza,seasonal quad, PF 09/23/2014     Tdap 10/05/2016     Health Maintenance   Topic     DEPRESSION ACTION PLAN      Pneumococcal Vaccine: Pediatrics (0 to 5 Years) and At-Risk Patients (6 to 64 Years) (1 of 1 - PPSV23)     MEDICARE ANNUAL WELLNESS VISIT      HEPATITIS B VACCINES (2 of 2 - CpG risk 2-dose series)     INFLUENZA VACCINE RULE BASED (1)     A1C      BMP      LIPID      DIABETIC FOOT EXAM      DIABETIC EYE EXAM      MICROALBUMIN      ADVANCE CARE PLANNING      TD 18+ HE      HEPATITIS C SCREENING      HIV SCREENING      TDAP ADULT ONE TIME DOSE      Lisseth Ho,  MD  Family Medicine, [facility]     This note was dictated using a voice recognition software.  Any grammatical or context distortion are unintentional and inherent to the software.

## 2021-06-15 NOTE — PROGRESS NOTES
OFFICE VISIT - FAMILY MEDICINE     ASSESSMENT AND PLAN     1. Type 2 diabetes mellitus with hyperglycemia, with long-term current use of insulin  Glycosylated Hemoglobin A1c    Comprehensive Metabolic Panel    Microalbumin, Random Urine    metFORMIN (GLUCOPHAGE-XR) 500 MG 24 hr tablet    insulin glargine (LANTUS; BASAGLAR) 100 unit/mL (3 mL) pen    CONTOUR NEXT STRIPS strips    generic lancets (MICROLET LANCET)    Ambulatory referral to Ophthalmology   2. Essential hypertension with goal blood pressure less than 140/90  Comprehensive Metabolic Panel    Urinalysis    lisinopril (PRINIVIL,ZESTRIL) 10 MG tablet   3. Stenosis of right carotid artery  HM1(CBC and Differential)    HM1 (CBC with Diff)   4. Overweight  Thyroid Stimulating Hormone (TSH)   5. Hypertriglyceridemia  Lipid Cascade RANDOM   6. Vitamin D deficiency  Vitamin D, Total (25-Hydroxy)   7. Hyperlipidemia, unspecified hyperlipidemia type  atorvastatin (LIPITOR) 80 MG tablet   8. Severe episode of recurrent major depressive disorder, without psychotic features  sertraline (ZOLOFT) 50 MG tablet   9. Non compliance with medical treatment     Diabetes type 2, dyslipidemia, depression, not adequately controlled, patient has not been taking his medication for a couple of months because of lack of insurance, all of the medication were refilled, we will have him see our in-house pharmacist within a month, and follow with me in 3 months.  Was encouraged to continue healthy lifestyle changes, check his blood sugar 4 times a day and keep a log of the results.    CHIEF COMPLAINT   Diabetes (F/U) and Medication Refill (ALL)    MORIAH Muñoz is a 32 y.o. male.  Updated MIIC    Established at [facility] 9/2014.  Previously at [facility].  Past history of stroke with left hemiparesis, type 2 diabetes mellitus, hypertension, obesity and severe recurrent major depression followed by counselor.  Patient has not been taking his medication for the past 2  "months, he stated that he ran out of his medication but did not have insurance to follow-up or afford his prescription medication.  He denies any new complaint, has not been checking his blood sugar, A1c is 9.2% today, on June 19, 2017, he has an A1c of 10%.  Current medication were reviewed with him, he is not taking Lantus 35 units daily, daily Victoza, on daily metformin 2 g. He is also not taking any of his current medication.  He is asking for a refill.  Has not seen an eye doctor for past couple of years, and after discussion with him is in agreement to make an appointment to see provider [facility].  Denies any suicidal thought.  Also like his Zoloft to be refilled.  Has been on that for quite some time strong.    Review of Systems As per HPI, otherwise negative.    OBJECTIVE   /72 (Patient Site: Right Arm)  Pulse 72  Temp 97.8  F (36.6  C) (Oral)   Resp 13  Ht 5' 2\" (1.575 m)  Wt 150 lb (68 kg)  BMI 27.44 kg/m2  Physical Exam   Constitutional: He is oriented to person, place, and time. He appears well-developed and well-nourished.   HENT:   Head: Normocephalic and atraumatic.   Neck: Normal range of motion. Neck supple. No JVD present. No tracheal deviation present. No thyromegaly present.   Cardiovascular: Normal rate, regular rhythm, normal heart sounds and intact distal pulses.  Exam reveals no gallop and no friction rub.    No murmur heard.  Pulmonary/Chest: Effort normal and breath sounds normal. No respiratory distress. He has no wheezes. He has no rales.   Musculoskeletal: He exhibits no edema or tenderness.   Lymphadenopathy:     He has no cervical adenopathy.   Neurological: He is alert and oriented to person, place, and time. Coordination normal.   Psychiatric: He has a normal mood and affect. Judgment and thought content normal.       [facility]     Family History   Problem Relation Age of Onset     No Medical Problems Mother      No Medical Problems Father      No Medical Problems " Sister      No Medical Problems Brother      No Medical Problems Maternal Grandmother      No Medical Problems Maternal Grandfather      No Medical Problems Paternal Grandmother      No Medical Problems Paternal Grandfather      Social History     Social History     Marital status: Single     Spouse name: N/A     Number of children: N/A     Years of education: N/A     Occupational History     Not on file.     Social History Main Topics     Smoking status: Never Smoker     Smokeless tobacco: Never Used     Alcohol use No     Drug use: No     Sexual activity: Not on file     Other Topics Concern     Not on file     Social History Narrative     Relevant history was reviewed with the patient today, unless noted in HPI, nothing is pertinent for this visit.  [facility]     Patient Active Problem List    Diagnosis Date Noted     Non compliance with medical treatment 01/23/2018     Stenosis of right carotid artery 08/26/2017     Bell's palsy 08/26/2017     History of right carotid artery dissection 08/26/2017     Vitamin D deficiency disease 08/06/2016     Hypertriglyceridemia 08/06/2016     Failure to attend appointment 08/10/2015     Overview Note:     8/10/2015 (Dr. Grijalva)       Insomnia 04/27/2015     Lumbar radiculopathy 04/27/2015     Low back pain 04/27/2015     Neck pain 04/27/2015     Tinea versicolor 04/27/2015     Stroke due to intracerebral hemorrhage, secondary to carotid artery dissection 04/27/2015     Overview Note:     Right cerebrovascular accident secondary to a right internal carotid artery dissection.  Surgical procedure recommended by Neurologist declined.       Mixed hyperlipidemia      Severe episode of recurrent major depressive disorder, without psychotic features      Overview Note:     Replacement Utility updated for latest IMO load       Blurry Vision      Essential hypertension with goal blood pressure less than 140/90      Overview Note:     Replacement Utility updated for latest IMO load        Fatigue      Hemiparesis Of Left Side      Overview Note:     Replacement Utility updated for latest IMO load       History of ischemic stroke without residual deficits      Type 2 diabetes mellitus with hyperglycemia, with long-term current use of insulin      Overview Note:     Diagnosed at time of stroke, 2013.       Vitamin D Deficiency      Overview Note:     Replacement Utility updated for latest IMO load       No past surgical history on file.    RESULTS/CONSULTS (Lab/Rad)     Recent Results (from the past 168 hour(s))   Glycosylated Hemoglobin A1c   Result Value Ref Range    Hemoglobin A1c 9.2 (H) 3.5 - 6.0 %   HM1 (CBC with Diff)   Result Value Ref Range    WBC 7.0 4.0 - 11.0 thou/uL    RBC 4.74 4.40 - 6.20 mill/uL    Hemoglobin 14.4 14.0 - 18.0 g/dL    Hematocrit 42.4 40.0 - 54.0 %    MCV 89 80 - 100 fL    MCH 30.3 27.0 - 34.0 pg    MCHC 33.9 32.0 - 36.0 g/dL    RDW 11.3 11.0 - 14.5 %    Platelets 274 140 - 440 thou/uL    MPV 6.8 (L) 7.0 - 10.0 fL    Neutrophils % 60 50 - 70 %    Lymphocytes % 31 20 - 40 %    Monocytes % 6 2 - 10 %    Eosinophils % 2 0 - 6 %    Basophils % 1 0 - 2 %    Neutrophils Absolute 4.2 2.0 - 7.7 thou/uL    Lymphocytes Absolute 2.2 0.8 - 4.4 thou/uL    Monocytes Absolute 0.4 0.0 - 0.9 thou/uL    Eosinophils Absolute 0.2 0.0 - 0.4 thou/uL    Basophils Absolute 0.0 0.0 - 0.2 thou/uL   Urinalysis   Result Value Ref Range    Color, UA Yellow Colorless, Yellow, Straw, Light Yellow    Clarity, UA Clear Clear    Glucose,  mg/dL (!) Negative    Bilirubin, UA Negative Negative    Ketones, UA Negative Negative    Specific Gravity, UA 1.015 1.005 - 1.030    Blood, UA Negative Negative    pH, UA 7.0 5.0 - 8.0    Protein, UA Negative Negative mg/dL    Urobilinogen, UA 0.2 E.U./dL 0.2 E.U./dL, 1.0 E.U./dL    Nitrite, UA Negative Negative    Leukocytes, UA Negative Negative     No results found.  MEDICATIONS     Current Outpatient Prescriptions on File Prior to Visit   Medication Sig  "Dispense Refill     blood-glucose meter (CONTOUR NEXT EZ METER) Misc Use to test blood sugar 4 times per day 1 each 0     cane Abbie Use daily.  #1.  DX: Status post stroke, left hemiparesis 1 each 0     carboxymethylcellulose (REFRESH LIQUIGEL) 1 % ophthalmic solution One drop in the right eye every hour until facial weakness goes away or until evaluation by PCP 30 mL 12     diclofenac sodium (VOLTAREN) 1 % Gel Use dosing card to apply 2 grams topically four times daily as needed (Patient taking differently: Apply 1 application topically 4 (four) times a day as needed. Use dosing card to apply 2 grams topically four times daily as needed) 100 g 0     pen needle, diabetic (BD ULTRA-FINE NEHAL PEN NEEDLES) 32 gauge x 5/32\" Ndle Use one pen daily to inject Lantus (insulin) and Victoza 100 each 3     predniSONE (DELTASONE) 20 MG tablet Take 3 tabs daily for 5 days, then 2 tabs daily for 3 days, then 1 tab daily for 3 days, then half tab daily for 3 days, then stop. 26 tablet 0     selenium sulfide (SELSUN) 2.5 % lotion Leave on trunk and arms 10 min, then wash off.  Repeat 3-5 times per wek. (Patient taking differently: Apply 1 application topically see administration instructions. Leave on trunk and arms 10 min, then wash off.  Repeat 3-5 times per wek.) 120 mL 1     No current facility-administered medications on file prior to visit.        HEALTH MAINTENANCE / SCREENING   PHQ-2 Total Score: 6 (8/31/2017 10:00 AM), PHQ-9 Total Score: 25 (8/31/2017 10:00 AM),HAI-7 Total: 20 (8/31/2017 10:00 AM)  Immunization History   Administered Date(s) Administered     Influenza,seasonal quad, PF 09/23/2014     Tdap 10/05/2016     Health Maintenance   Topic     DIABETES OPHTHALMOLOGY EXAM      DIABETES URINE MICROALBUMIN      DIABETES FOLLOW-UP      DIABETES FOOT EXAM      DEPRESSION FOLLOW UP      DIABETES HEMOGLOBIN A1C      ADVANCE DIRECTIVES DISCUSSED WITH PATIENT      TDAP ADULT ONE TIME DOSE      TD 18+ HE      INFLUENZA " VACCINE RULE BASED        Lisseth Trev Ho MD  Family Medicine, [facility]     This note was dictated using a voice recognition software.  Any grammatical or context distortion are unintentional and inherent to the software.

## 2021-06-16 PROBLEM — I65.21 STENOSIS OF RIGHT CAROTID ARTERY: Status: ACTIVE | Noted: 2017-08-26

## 2021-06-16 PROBLEM — Z86.79 HISTORY OF CAROTID ARTERY DISSECTION: Status: ACTIVE | Noted: 2017-08-26

## 2021-06-16 PROBLEM — I69.352: Status: ACTIVE | Noted: 2019-07-29

## 2021-06-16 PROBLEM — Z91.199 NON COMPLIANCE WITH MEDICAL TREATMENT: Status: ACTIVE | Noted: 2018-01-23

## 2021-06-16 NOTE — TELEPHONE ENCOUNTER
"Attempt to call pt, Voice mail not set up unable to leave message.#2  \" Okay to relay message\"      sending letter   "

## 2021-06-16 NOTE — PROGRESS NOTES
02/12/18 The Clinic Care Guide called the patient  today at the request of the PCP to discuss possible clinic care coordination enrollment. No answer, unable to leave voice message. If patient is returning my call, please transfer to [phone]. (Attempt One)

## 2021-06-16 NOTE — PROGRESS NOTES
MTM Follow Up Encounter  ASSESSMENT AND PLAN  Type 2 Diabetes:  poorly controlled, blood sugars only slightly improved. A1C was not at goal of <7%. FBP not at goal  mg/dL, but limited values. Encouraged patient to check more often. He is taking metformin appropriately. Will have him maximize Victoza today -- showed dose of 1.8 mg on demo pen. Will also increase insulin slightly to 38 units. Encouraged him to continue to go to the gym and monitor for hypoglycemia when he exercises.   PLAN:   1. Increase Victoza to 1.8 mg daily.   2. Increase insulin glargine to 38 units daily.      Vitamin D Deficiency: Last Vitamin D level was low, patient appears to be taking Vitamin D once weekly appropriately. Will have him continue and will recheck Vitamin D level will follow up with other labs.     MTM FOLLOW UP  1 month    SUBJECTIVE AND OBJECTIVE  Sanchez Muñoz is a 32 y.o. male here for a follow-up medication therapy management (MTM) appointment. A [employer]  joins us today.     Medication Concern(s)/Question(s): none    Medication Adherence: Brings all of his medicines and his pill box which he set up.     Type 2 Diabetes: Currently taking metformin  mg -- at last MTM appt was increased to 2 tablets (1000 mg) BID. Reports diarrhea at first then improved. This dose is in his pill box correctly. Reports that he is taking Victoza -- shows me that he is taking 1.2 mg daily. Denies nausea. No changes in his appetite or weight loss. Continues on Lantus 35 units -- shows me dose on pen.   Tests BG 0-1 times daily. Does it when he remembers -- notices it is high so he doesn't like to check.   Blood sugars per glucometer since last MTM appt:   fasting AM = 244, 257, 265, 214,   11am = 234, 241  2pm = 234  Last A1c checked 1/22/18 = 9.2%.   Hypoglycemia he is not sure if he gets low -- denies symptoms.    Using a [employer] (Inspect Manager) Contour Next EZ meter.   Microalbumin checked 1/22/18  Eats less rice and milk. Goes to gym  ([employer]) 3 times a week and jogs, weight lifts.      Vitamin D Deficiency: Brings bottle of Vitamin D 50,000 IU once weekly with him today. Reports that he takes it once weekly on Mondays.   Vitamin D, Total (25-Hydroxy)   Date Value Ref Range Status   01/22/2018 7.7 (L) 30.0 - 80.0 ng/mL Final               Sanchez's medication list was reviewed with them, discussing reason for use, directions for use, and potential side effects of each medication as needed. Indication, safety, efficacy, and convenience was assessed for all medications addressed above.  No environmental factors were noted currently affecting patient.  This care plan was communicated via EMR with his primary care provider, Lisseht Ho MD, who is the authorizing prescriber for this visit.  Direct supervision was available by either the patient's PCP or other available provider.    Time and complexity billing metrics are included in the docflowsheet linked to this visit    Time spent: 30 min  Gabby Gonsalez, Pharm.D., BCACP   [facility]

## 2021-06-16 NOTE — TELEPHONE ENCOUNTER
Called pt chaim to leave voicemail due to mailbox not set up. Use  line to contact patient :joseph    ----- Message from Lisseth Ho MD sent at 4/13/2021 12:12 PM CDT -----  A1c increased to 8% compared to 7% last time, probably because you were not taking your medication, make sure that you take your medication as directed and follow-up in about 3 months.

## 2021-06-16 NOTE — PROGRESS NOTES
02/12/18 The Clinic Care Guide called the patient  today at the request of the PCP to discuss possible clinic care coordination enrollment. No answer, unable to leave voice message. If patient is returning my call, please transfer to [phone]. (Attempt One)  02/19/18 Patient was identified as a potential candidate for the Clinic Care Coordination program and has declined participating. I have reached out to this patient multiple times over the last year and the patient has continued to decline participation. I will discontinue outreach to the patient at this time. I have notified the patient s physician by task. If the provider feels this patient is a good fit in the future I have asked them to resend to the [facility] referral bucket. I have also provided the patient with my contact information should they change their mind.

## 2021-06-16 NOTE — TELEPHONE ENCOUNTER
"Attempt to call pt, Voice mail not set up unable to leave message.#2  \" Okay to relay message\"      "

## 2021-06-16 NOTE — PROGRESS NOTES
MTM Follow Up Encounter  ASSESSMENT AND PLAN  Medication Adherence: Overall patient's medications were set up corectly. One day he had two metformin tablets in the morning and one tablet the rest of the week. Increased to two tablets BID today and updated his pill box. Patient is taking gabapentin twice daily, not three times daily so will update rx.    Type 2 Diabetes:  poorly controlled. A1C was not at goal of <7%, likely due to poor adherence. FBP overall not at goal  mg/dL, but patient is not checking frequently and is checking at random times.   Recommended that he increase metformin to 2 tablets BID -- set this up in his pill box.   It does not appear that he has started Victoza. Reviewed administration and dosing. Will leave insulin dose the same for today.   PLAN:   1. Increase metformin XR to 2 tablets (1000 mg) BID.   2. Start Victoza 0.6 mg x 1 week, then 1.2 mg daily.     Hx Hepatitis B: Will check Hep B with future labs.     Vitamin D Deficiency: Last Vitamin D level is very low. Will start him on Vitamin D 50,000 IU once weekly and recheck a Vitamin D level in about 3 months to ensure absorption.   PLAN:   1. Start Vitamin D 50,000 IU once weekly    MTM FOLLOW UP  1 month    SUBJECTIVE AND OBJECTIVE  Sanchez Muñoz is a 32 y.o. male here for a follow-up medication therapy management (MTM) appointment. A [employer]  joins us today.     Medication Adherence: Reports no insurance for 2-3 months. Was not on medications but does not know how long. Restarted a week ago. Brings a pill box that he sets up. Sometimes his niece of nephew sets them up for him.   AM = aspirin, atorvastatin, gabapentin x1, lisinopril, sertraline, metformin x2  PM = gabapentin x1, metformin x1     Type 2 Diabetes: Currently taking metformin  mg x2 (1000 mg) AM and 500 mg PM and Lantus 35 units daily. Victoza is on file, but he reports that he is not taking it. Was on Trulicity in the past but he ran out. Was prescribed  Deepthi by Dr. Ho on 1/22/18.   Denies diarrhea.   Tests BG 0-1 times daily. Admits that he forgets to check. Blood sugars per glucometer:   9am = 254, 232, 323 (12pm), 224 (1030am), 310 (10am), 218 (10am), 357 (1pm)   Last A1c checked 1/22/18 = 9.2%.   Using a Shreya Contour Next meter.   Microalbumin checked 1/22/18    Hx Hepatitis B: Patient wonders if he has hepatitis B. Reports that in 2004 he was told that he has Hep B and was given treatment, but he does not know if he still has it.     Vitamin D Deficiency: Is currently taking no medication. Reports that he does not go outside often and he is sometimes tired.   Vitamin D, Total (25-Hydroxy)   Date Value Ref Range Status   01/22/2018 7.7 (L) 30.0 - 80.0 ng/mL Final               Sanchez's medication list was reviewed with them, discussing reason for use, directions for use, and potential side effects of each medication as needed. Indication, safety, efficacy, and convenience was assessed for all medications addressed above.  No environmental factors were noted currently affecting patient.  This care plan was communicated via EMR with his primary care provider, Lisseth Ho MD, who is the authorizing prescriber for this visit.  Direct supervision was available by either the patient's PCP or other available provider.    Time and complexity billing metrics are included in the docflowsheet linked to this visit    Time spent: 30  min  Gabby Gonsalez, Pharm.D., BCACP   MTM Pharmacist at [facility]

## 2021-06-17 NOTE — PROGRESS NOTES
MTM Follow Up Encounter  ASSESSMENT AND PLAN  Medication Adherence: Patient is poorly adherent -- will follow up closely to ensure adherence. Refilled all of his medications today,  will  when ready.     Depression: Uncontrolled. Will defer to psych today but recommended that psych refill his sertraline, in case any changes were made today.     Type 2 Diabetes:  poorly controlled and patient poorly compliant. A1C worsened and not at goal of <7% due to being off medication. FBP likely not at goal  mg/dL. Will have him restart at 35 units of basaglar since that was his reported previous dose, but he will likely need a higher dose in the future. Restart metformin 500 mg once daily -- will titrate up at follow up to reduce risk of diarrhea.  Patient did not recognize Victoza, therefore he may not have been taking it this whole time. Will restart at follow up.   Provided patient with a new glucometer today. Will refill strips, lancets, and pen needles.   History of microalbuminuria -- will have him restart lisinopril.   PLAN:   1. Refill and restart basaglar 35 units daily.   2. Refill and restart metformin  mg daily.  3. Provided new Shreya Contour Next EZ meter today -- restart testing blood sugars twice daily.   4. Shreya Contour Next strips, microlet lancets and pen needles refilled.    5. Refill lisinopril 10 mg daily    Stroke Syndrome: Will restart aspirin 81 mg today due to past stroke.   PLAN:   1. Refill and restart aspirin 81 mg daily     Hyperlipidemia: Patient was on a high-intensity statin, which is appropriate per 2013 ACC/AHA Cholesterol Guidelines -- will have him restart.   PLAN:   1. Refill and restart atorvastatin 80mg daily.     Pain: Uncontrolled. Will refill and restart diclofenac gel and gabapentin -- since he has been off gabapentin, will restart at low dose of 300 mg once daily.   PLAN:   1. Refill and restart diclofenac gel   2. Refill and restart gabapentin --  decreased dose to 300 mg once daily     Vitamin D Deficiency: Last Vitamin D level was low and at last MTM appt patient was tasking Vitamin D 50,000 IU. Will have him restart.   PLAN:   1. Refill and restart Vitamin D 50,000 IU weekly    MTM FOLLOW UP  1 month    SUBJECTIVE AND OBJECTIVE  Sanchez Muñoz is a 32 y.o. male here for a follow-up medication therapy management (MTM) appointment. A [employer]  joins us today.     Medication Adherence: Did not bring any of his medications or glucometer. He moved and he does not know where any of his medications are. Thinks he has not taken them for over a month.     Depression: Reports that he has been very depressed -- got kicked out of where he was living and is now living with his brother. Kicked out because he could not contribute to paying. He is unable to work due to left arm and hand weakness. Reports being forgetful, does not want to do anything, just wants to be alone. Has thought about suicide, but no plan. Was on sertraline 50 mg but he does not think he has taken it for a month. They are seeing psych today. He has lost weight because he has a poor appetite from his living situation. Is working with a  at [facility] for public housing and has applied for SSI.      Type 2 Diabetes: Currently taking no medication. Believes he was on 35 units of basaglar, but he has not taken for a long time. He was on metformin  mg x2 (1000 mg) BID, but has no taken it. He does not think he was ever on Victoza -- when I show him the Victoza demo pen, he does not recognize it.   Tests BG 0 times daily -- cant find his glucometer.   Last A1c checked today = 10%, previously 9.2% on 1/22/18.   Hyperglycemia symptoms: Dizzy, headache, no polyuria, no blurry vision.   Using a Shreya Contour Next EZ meter.   Microalbumin checked today, results pending.     Stroke Syndrome: Hx stroke 2013. Residual hemiparesis. No longer on aspirin 81 mg because he cant find his medications.        Hyperlipidemia: Currently taking no medication. Was on atorvastatin 80 mg. Lipid panel checked today, results pending. Of note, not fasting today. Last lipid panel 1/22/18, rechecked today level pending.     Pain: No longer taking gabapentin 300 mg BID or diclofenac gel. Describes burning in low back  and sharp pain under the feet and low back.     Vitamin D Deficiency: Taking no Vitamin D supplement. Was on 50,000 IU once daily, then stopped.   Vitamin D, Total (25-Hydroxy)   Date Value Ref Range Status   01/22/2018 7.7 (L) 30.0 - 80.0 ng/mL Final             Sanchez's medication list was reviewed with them, discussing reason for use, directions for use, and potential side effects of each medication as needed. Indication, safety, efficacy, and convenience was assessed for all medications addressed above.  No environmental factors were noted currently affecting patient.  This care plan was communicated via EMR with his primary care provider, Lisseth Ho MD, who is the authorizing prescriber for this visit.  Direct supervision was available by either the patient's PCP or other available provider.    Time and complexity billing metrics are included in the docflowsheet linked to this visit    Time spent: 30 min  Gabby Gonsalez, Pharm.D., BCACP   MTM Pharmacist at [facility] and [facility]

## 2021-06-18 NOTE — PATIENT INSTRUCTIONS - HE
Patient Instructions by Lisseth Ho MD at 9/23/2020  1:20 PM     Author: Lisseth Ho MD Service: -- Author Type: Physician    Filed: 9/24/2020  3:43 PM Encounter Date: 9/23/2020 Status: Signed    : Lisseth Ho MD (Physician)         Patient Education     Diabetes: Understanding Carbohydrates, Fats, and Protein  Food is a source of fuel and nourishment for your body. Its also a source of pleasure. Having diabetes doesnt mean you have to eat special foods or give up desserts. Instead, your dietitian can show you how to plan meals to suit your body. To start, learn how different foods affect blood sugar.  Carbohydrates  Carbohydrates (carbs) are the main source of fuel for the body. They raise blood sugar. Many people think carbohydrates are only in pasta or bread. But carbohydrates are in many kinds of foods. Carbs include:    Sugars.These are naturally in foods such as fruit, milk, honey, and molasses. Sugars can also be added to many foods. They may be added to cereals and yogurt to candy and desserts. Sugars raise blood sugar.    Starches. These are in bread, cereals, pasta, and dried beans. Theyre found in corn, peas, potatoes, yam, acorn squash, and butternut squash. Starches raise blood sugar.     Fiber. This is in foods such as vegetables, fruits, beans, and whole grains. Unlike other carbs, fiber isnt digested or absorbed. So it doesnt raise blood sugar. In fact, fiber can help keep blood sugar from rising too fast. It helps keep blood cholesterol at a healthy level.  Did you know?  Even though carbohydrates raise blood sugar, its best to have some in every meal. They are an important part of a healthy diet.  Fat  Fat is an energy source that can be stored until needed. Fat does not raise blood sugar. But it can raise blood cholesterol. This increases the risk of heart disease. Fat is high in calories. Eating too many calories can cause weight gain. Not all types  "of fat are the same.  More healthy:    Monounsaturated fats. These are mostly found in vegetable oils, such as olive, canola, and peanut oils. They are found in avocados and some nuts. Monounsaturated fats are healthy for your heart. Thats because they lower LDL (unhealthy) cholesterol.    Polyunsaturated fats.These are mostly found in vegetable oils, such as corn, safflower, and soybean oils. They are found in some seeds, nuts, and fish. Polyunsaturated fats lower LDL (unhealthy) cholesterol. So, choosing them instead of saturated fats is healthy for your heart. Certain unsaturated fats can help lower triglycerides.   Less healthy:    Saturated fats.These are found in animal products, such as meat, poultry, whole milk, lard, and butter. Saturated fats raise LDL cholesterol.They are not healthy for your heart.    Hydrogenated oilsand trans fats. These are formed when vegetable oils are processed into solid fats. They are found in many processed foods. Hydrogenated oils and trans fats raise LDL (\"bad\") cholesterol and lower HDL (\"good\") cholesterol. They are not healthy for your heart.  Protein  Protein helps the body build and repair muscle and other tissue. Protein has little or no effect on blood sugar. But many foods that have protein also have saturated fat. By choosing low-fat protein sources, you can get the benefits of protein without the extra fat:    Plant protein. This is found in dry beans and peas, nuts, and soy products, such as tofu and soymilk. These foods tend to have no cholesterol.Most are low in saturated fat.    Animal protein. This is found in fish, poultry, meat, cheese, milk, and eggs. These foods have cholesterol.They can be high in saturated fat. Aim for lean, lower-fat choices. Don't eat fried foods.  Date Last Reviewed: 4/1/2019 2000-2019 [employer]. [address] [place] [address], [place] [address]. All rights reserved. This information is not intended as a substitute for " professional medical care. Always follow your healthcare professional's instructions.

## 2021-06-18 NOTE — PROGRESS NOTES
MTM Follow Up Encounter  Assessment & Plan                                                     Type 2 Diabetes:  borderline controlled. A1C was not at goal of <7% but at the time patient was off medications. FBG not at goal  mg/dL. Will have patient continue current basaglar dose, but recommended increasing metformin to 2000 mg/day by adding 500 mg/ week.   PLAN:   1. Increase metformin by 500 mg weekly until taking 2000 mg/day     Stroke Syndrome: Stable. Recommended to continue current regimen.     Pain: Stable. Recommended to continue current regimen.     Vitamin D Deficiency:  Last Vitamin D level was low therefore recommended that he continue Vitamin D weekly. Refilled.     Depression: Continue current regimen and will defer to psych about increasing doses. Since patient did not bring his medications today, it is unclear whether he is actually taking sertraline and trazodone.     Tinea Versicolor: Appears that patient was on selenium sulfide in the past - refilled today.     Follow Up  1 month      Subjective & Objective                                                       Sanchez Muñoz is a 32 y.o. male coming in for a follow up visit for Medication Therapy Management. He was referred to me from Lisseth Ho MD. A [employer]  joins us today.     Still living with brother and sister-and-law, but he was not able to get his old medications but did receive all the new medications I had refilled for him. He does not plan on staying there long.  laurence Jacob applied for him for public housing.     Type 2 Diabetes: Is taking basaglar 35 units and metformin  mg daily. At first had diarrhea from metformin but that is better now. Reports that he does not feel as tired or weak.   Fasting AM = 191, 172, 183, 195, 193, 172, 153, 154, 169, 177, 157, 168, 168, 114, 165  Noon = 159, 165, 284, 177, 196  4-5pm = 236, 264, 218, 222, 228, 173, 275, 234  Last A1c = 10% on 4/24/18   Believes he  "restarted lisinopril 10 mg daily. Last microalbuminuria checked 4/24/18.     Stroke Syndrome: Believes he restarted aspirin 81 mg and atorvastatin 80 mg daily. Hx of stroke 2013 with residual hemiparesis.   Last lipids checked 4/24/18    Pain: Reports that he did get the diclofenac gel, which he uses on his lower back. Reports helpful. Reports that he is taking gabapentin 300 mg daily - denies fatigue.     Vitamin D Deficiency: Reports that he received Vitamin D 50,000 IU daily, but he ran out.   Last Vitamin D level on 4/24/18 = 17    Depression: At last [facility] appt, he was also seeing psychiatry that day. Reports that his sertraline medication did not change -- 50 mg on file. When asked about trazodone, he hinks maybe was given but they dont remember. Sme nights he sleeps well, but worse when he has lower back pain. He thinks he is taking a sleeping pill.     Tinea Versicolor: He would like cream which he got in the past from Dr. Grijalva - reports when he uses it it helps. Denies a rash, but looks like \"red scar,\" not painful. When I show him a picture of selenium sulfide, he does recognize it.      Medication Adherence/Access: Did not bring his medications today.     PMH: reviewed in EPIC   Allergies/ADRs: reviewed in EPIC   Alcohol: reviewed in EPIC   Tobacco:   History   Smoking Status     Never Smoker   Smokeless Tobacco     Never Used     Today's Vitals: There were no vitals filed for this visit.  ----------------    Much or all of the text in this note was generated through the use of Dragon Dictate voice-to-text software. Errors in spelling or words which seem out of context are unintentional. Sound alike errors, in particular, may have escaped editing.    The patient      I spent 30 minutes with this patient today;  . All changes were made via collaborative practice agreement with Lisseth Ho MD. A copy of the visit note was provided to the patient's provider.     Gabby Gonsalez, Pharm.D., " "BCA  Medication Therapy Management Pharmacist  [facility] and [facility]       Current Outpatient Prescriptions   Medication Sig Dispense Refill     aspirin 81 MG EC tablet Take 1 tablet (81 mg total) by mouth daily. 90 tablet 3     atorvastatin (LIPITOR) 80 MG tablet Take 1 tablet (80 mg total) by mouth daily. 90 tablet 3     blood-glucose meter (CONTOUR NEXT EZ METER) Misc Use to test blood sugar 4 times per day 1 each 0     cane Abbie Use daily.  #1.  DX: Status post stroke, left hemiparesis 1 each 0     CONTOUR NEXT TEST STRIPS strips Use to test blood sugar 2 times per day. Pharmacy to dispense brand per insurance - pt has [employer] Contour Next EZ meter 100 strip 11     diclofenac sodium (VOLTAREN) 1 % Gel Use dosing card to apply 2 grams topically four times daily as needed 100 g 2     gabapentin (NEURONTIN) 300 MG capsule Take 1 capsule (300 mg total) by mouth daily. 30 capsule 1     generic lancets (MICROLET LANCET) Use to test blood sugar 2 times per day. 100 each 11     insulin glargine (LANTUS; BASAGLAR) 100 unit/mL (3 mL) pen Inject 35 Units under the skin at bedtime. 15 mL 5     lisinopril (PRINIVIL,ZESTRIL) 10 MG tablet Take 1 tablet (10 mg total) by mouth daily. 90 tablet 3     metFORMIN (GLUCOPHAGE-XR) 500 MG 24 hr tablet Take 1 tablet (500 mg total) by mouth daily. 30 tablet 1     pen needle, diabetic (BD ULTRA-FINE NEHAL PEN NEEDLES) 32 gauge x 5/32\" Ndle Use one pen daily to inject Lantus (insulin) and Victoza 100 each 3     selenium sulfide (SELSUN) 2.5 % lotion Leave on trunk and arms 10 min, then wash off.  Repeat 3-5 times per wek. (Patient taking differently: Apply 1 application topically see administration instructions. Leave on trunk and arms 10 min, then wash off.  Repeat 3-5 times per wek.) 120 mL 1     sertraline (ZOLOFT) 50 MG tablet Take 1 tablet (50 mg total) by mouth daily. 30 tablet 6     No current facility-administered medications for this visit.                              "

## 2021-06-18 NOTE — PROGRESS NOTES
OFFICE VISIT - FAMILY MEDICINE     ASSESSMENT AND PLAN     1. Type 2 diabetes mellitus with hyperglycemia, with long-term current use of insulin (H)  CONTOUR NEXT TEST STRIPS strips   2. Hyperlipidemia, unspecified hyperlipidemia type  atorvastatin (LIPITOR) 80 MG tablet   3. Essential hypertension with goal blood pressure less than 140/90  lisinopril (PRINIVIL,ZESTRIL) 10 MG tablet   4. Hemiplegia (H)     Diabetes type II, inadequately controlled with metformin 2000 mg daily  and Lantus 35 units at night, last A1c was 10%; option would be to add a DPP4 inhibitor or meal insulin if his A1c continues to increase,  Instructed the patient to resume taking Lipitor 80 mg daily and lisinopril 10 mg daily, continue aggressive risk factor modification.  Follow with me in 3 months, sooner if there is any question or concern.  CHIEF COMPLAINT   Diabetes (F/U) and Medication Refill (METER/STRIPS)    HPI   Sanchez Muñoz is a 33 y.o. male.  Updated MIIC    Established at [facility] 9/2014.  Previously at [facility].  Past history of stroke with left hemiparesis, type 2 diabetes mellitus, hypertension, obesity and severe recurrent major depression followed by counselor.    Patient is followed up on diabetes type 2, poorly controlled, last A1c done on April 24, 2018 was 10%, there was an increase from last January at 9.2%.  Has seen our in-house pharmacist, metformin was maximized to 2000 mg daily, patient brought medication that is currently taking today, he does show me metformin prescription, vitamin D, baby aspirin, and gabapentin.  Terminal is not taking anything else besides the sertraline prescribed by the psychiatrist.  He was supposed to take Lipitor 80 mg daily and lisinopril 10 mg daily, stating that he was not aware of that.  Has not been checking his blood sugar lately because his pharmacy have been giving him the wrong strip,he  is asking for refill of the correct contour next 3.        Review of Systems  "As per HPI, otherwise negative.    OBJECTIVE   /64 (Patient Site: Left Arm)  Pulse 64  Temp 97.7  F (36.5  C) (Oral)   Resp 13  Ht 5' 2\" (1.575 m)  Wt 146 lb (66.2 kg)  BMI 26.7 kg/m2  Physical Exam   Constitutional: He is oriented to person, place, and time. He appears well-developed and well-nourished.   HENT:   Head: Normocephalic and atraumatic.   Neck: Normal range of motion. Neck supple. No JVD present. No tracheal deviation present. No thyromegaly present.   Cardiovascular: Normal rate, regular rhythm, normal heart sounds and intact distal pulses.  Exam reveals no gallop and no friction rub.    No murmur heard.  Pulmonary/Chest: Effort normal and breath sounds normal. No respiratory distress. He has no wheezes. He has no rales.   Musculoskeletal: He exhibits no edema or tenderness.   Lymphadenopathy:     He has no cervical adenopathy.   Neurological: He is alert and oriented to person, place, and time. Coordination normal.   Loss of peripheral sensation in the left plantar foot .  Chronic weakness in the left upper and lower extremities   Psychiatric: He has a normal mood and affect. Judgment and thought content normal.       PFSH     Family History   Problem Relation Age of Onset     No Medical Problems Mother      No Medical Problems Father      No Medical Problems Sister      No Medical Problems Brother      No Medical Problems Maternal Grandmother      No Medical Problems Maternal Grandfather      No Medical Problems Paternal Grandmother      No Medical Problems Paternal Grandfather      Social History     Social History     Marital status: Single     Spouse name: N/A     Number of children: N/A     Years of education: N/A     Occupational History     Not on file.     Social History Main Topics     Smoking status: Never Smoker     Smokeless tobacco: Never Used     Alcohol use No     Drug use: No     Sexual activity: Not on file     Other Topics Concern     Not on file     Social History " Narrative     Relevant history was reviewed with the patient today, unless noted in HPI, nothing is pertinent for this visit.  [facility]     Patient Active Problem List    Diagnosis Date Noted     Non compliance with medical treatment 01/23/2018     Stenosis of right carotid artery 08/26/2017     History of right carotid artery dissection 08/26/2017     Vitamin D deficiency disease 08/06/2016     Hypertriglyceridemia 08/06/2016     Failure to attend appointment 08/10/2015     Overview Note:     8/10/2015 (Dr. Grijalva)       Insomnia 04/27/2015     Lumbar radiculopathy 04/27/2015     Low back pain 04/27/2015     Neck pain 04/27/2015     Tinea versicolor 04/27/2015     Stroke due to intracerebral hemorrhage, secondary to carotid artery dissection 04/27/2015     Overview Note:     Right cerebrovascular accident secondary to a right internal carotid artery dissection.  Surgical procedure recommended by Neurologist declined.       Mixed hyperlipidemia      Severe episode of recurrent major depressive disorder, without psychotic features (H)      Overview Note:     Replacement Utility updated for latest IMO load       Blurry Vision      Essential hypertension with goal blood pressure less than 140/90      Overview Note:     Replacement Utility updated for latest IMO load       Fatigue      Hemiparesis Of Left Side      Overview Note:     Replacement Utility updated for latest IMO load       History of ischemic stroke without residual deficits      Type 2 diabetes mellitus with hyperglycemia, with long-term current use of insulin (H)      Overview Note:     Diagnosed at time of stroke, 2013.       Vitamin D Deficiency      Overview Note:     Replacement Utility updated for latest IMO load       No past surgical history on file.    RESULTS/CONSULTS (Lab/Rad)   No results found for this or any previous visit (from the past 168 hour(s)).  No results found.  MEDICATIONS     Current Outpatient Prescriptions on File Prior to Visit  "  Medication Sig Dispense Refill     aspirin 81 MG EC tablet Take 1 tablet (81 mg total) by mouth daily. 90 tablet 3     blood-glucose meter (CONTOUR NEXT EZ METER) Misc Use to test blood sugar 4 times per day 1 each 0     cane Abbie Use daily.  #1.  DX: Status post stroke, left hemiparesis 1 each 0     diclofenac sodium (VOLTAREN) 1 % Gel Use dosing card to apply 2 grams topically four times daily as needed 100 g 2     ergocalciferol (VITAMIN D2) 50,000 unit capsule Take 1 capsule (50,000 Units total) by mouth every 7 days. 4 capsule 2     gabapentin (NEURONTIN) 300 MG capsule Take 1 capsule (300 mg total) by mouth daily. 30 capsule 1     generic lancets (MICROLET LANCET) Use to test blood sugar 2 times per day. 100 each 11     insulin glargine (LANTUS; BASAGLAR) 100 unit/mL (3 mL) pen Inject 35 Units under the skin at bedtime. 15 mL 5     metFORMIN (GLUCOPHAGE-XR) 500 MG 24 hr tablet Take 4 tablets (2,000 mg total) by mouth daily. 120 tablet 1     pen needle, diabetic (BD ULTRA-FINE NEHAL PEN NEEDLES) 32 gauge x 5/32\" Ndle Use one pen daily to inject Lantus (insulin) and Victoza 100 each 3     selenium sulfide (SELSUN) 2.5 % lotion Leave on trunk and arms 10 min, then wash off.  Repeat 3-5 times per wek. 120 mL 1     sertraline (ZOLOFT) 50 MG tablet Take 1 tablet (50 mg total) by mouth daily. 30 tablet 6     [DISCONTINUED] atorvastatin (LIPITOR) 80 MG tablet Take 1 tablet (80 mg total) by mouth daily. 90 tablet 3     [DISCONTINUED] CONTOUR NEXT TEST STRIPS strips Use to test blood sugar 2 times per day. Pharmacy to dispense brand per insurance - pt has [employer] Contour Next EZ meter 100 strip 11     [DISCONTINUED] lisinopril (PRINIVIL,ZESTRIL) 10 MG tablet Take 1 tablet (10 mg total) by mouth daily. 90 tablet 3     No current facility-administered medications on file prior to visit.        HEALTH MAINTENANCE / SCREENING   PHQ-2 Total Score: 6 (1/23/2018 10:00 AM), PHQ-9 Total Score: 24 (1/23/2018 10:00 AM),HAI-7 Total: " 18 (1/23/2018 10:00 AM)  Immunization History   Administered Date(s) Administered     Influenza,seasonal quad, PF 09/23/2014     Tdap 10/05/2016     Health Maintenance   Topic     DEPRESSION FOLLOW UP      DIABETES HEMOGLOBIN A1C      DIABETES FOLLOW-UP      DIABETES URINE MICROALBUMIN      DIABETES FOOT EXAM      DIABETES OPHTHALMOLOGY EXAM      ADVANCE DIRECTIVES DISCUSSED WITH PATIENT      TDAP ADULT ONE TIME DOSE      TD 18+ HE      INFLUENZA VACCINE RULE BASED        Lisseth Ho MD  Family Medicine, [facility]     This note was dictated using a voice recognition software.  Any grammatical or context distortion are unintentional and inherent to the software.

## 2021-06-19 NOTE — PROGRESS NOTES
MTM Follow Up Encounter  Assessment & Plan                                                       Type 2 Diabetes:  improved. A1C was not at goal of <7%, but I would anticipate that it is improved based on recent blood sugars. FBG overall not at goal  mg/dL, but limited values. Will get patient new meter today and recommended that he check blood sugars before breakfast daily.   Encouraged  to  atorvastatin and lisinopril.   PLAN:   1. New OneTouch Ultra machine sent to [facility] -- start checking blood sugars twice daily    Depression/Insomnia: Stable. Continue to work with psychiatrist.  will  sertraline and trazodone from pharmacy today.     Follow Up  1 month    Subjective & Objective                                                       Sanchez Muñoz is a 33 y.o. male coming in for a follow up visit for Medication Therapy Management. He was referred to me from Lisseth Ho MD. A [place]  joins us today.     Chief Complaint: needs correct strip for his machine. Still living with brother and sister in law.     Type 2 Diabetes: Currently taking basaglar 35 units daily and metformin  mg x2 (1000 mg) BID. Denies diarrhea.   Tests BG 0-1 times daily recently. Does not have OneTouch meter, but has the strips. Blood sugars since last MTM appt:   12pm = 139, 137  4pm = 164  Has not eaten today. Today in clinic BG = 145.   Last A1c checked 4/24/18 = 10%.   Hypoglycemia none  Microalbumin checked 4/24/18 -- missing lisinopril today.   Does not have atorvastatin with him today -- was reordered but  has not picked up from pharmacy yet.   Reports that he is not eating as much in new living situation due to stress.     Depression/Insomnia: Does not have sertraline or trazodone with him today. Reports mood is the same, n ochange. No suicial thoughts. Reports sleep is better with medication, but he does not have it today. Thinks he does not have trazodone.  Currently sleep depends.     PMH: reviewed in EPIC   Allergies/ADRs: reviewed in EPIC   Alcohol: reviewed in EPIC   Tobacco:   History   Smoking Status     Never Smoker   Smokeless Tobacco     Never Used     Today's Vitals: There were no vitals filed for this visit.  ----------------    Much or all of the text in this note was generated through the use of Dragon Dictate voice-to-text software. Errors in spelling or words which seem out of context are unintentional. Sound alike errors, in particular, may have escaped editing.    The patient declined an after visit summary    I spent 15 minutes with this patient today; Patient is not Medicare Part D. All changes were made via collaborative practice agreement with Lisseth Ho MD. A copy of the visit note was provided to the patient's provider.     Gabby Gonsalez, Pharm.D., BCACP  Medication Therapy Management Pharmacist  [facility]     Current Outpatient Prescriptions   Medication Sig Dispense Refill     aspirin 81 MG EC tablet Take 1 tablet (81 mg total) by mouth daily. 90 tablet 3     atorvastatin (LIPITOR) 80 MG tablet Take 1 tablet (80 mg total) by mouth daily. 90 tablet 3     blood glucose test strips Use 1 each As Directed as needed. Dispense brand per patient's insurance at pharmacy discretion. 200 strip prn     blood-glucose meter (CONTOUR NEXT EZ METER) Misc Use to test blood sugar 4 times per day 1 each 0     blood-glucose meter (ONETOUCH VERIO IQ METER) Misc 1 devise 1 each 0     cane Abbie Use daily.  #1.  DX: Status post stroke, left hemiparesis 1 each 0     diclofenac sodium (VOLTAREN) 1 % Gel Use dosing card to apply 2 grams topically four times daily as needed 100 g 2     ergocalciferol (VITAMIN D2) 50,000 unit capsule Take 1 capsule (50,000 Units total) by mouth every 7 days. 4 capsule 2     gabapentin (NEURONTIN) 300 MG capsule Take 1 capsule (300 mg total) by mouth daily. 30 capsule 1     generic lancets (FINGERSTIX  "LANCETS) Dispense brand per patient's insurance at pharmacy discretion. 200 each prn     generic lancets (MICROLET LANCET) Use to test blood sugar 2 times per day. 100 each 11     insulin glargine (LANTUS; BASAGLAR) 100 unit/mL (3 mL) pen Inject 35 Units under the skin at bedtime. 15 mL 5     lisinopril (PRINIVIL,ZESTRIL) 10 MG tablet Take 1 tablet (10 mg total) by mouth daily. 90 tablet 3     metFORMIN (GLUCOPHAGE-XR) 500 MG 24 hr tablet Take 4 tablets (2,000 mg total) by mouth daily. 120 tablet 1     pen needle, diabetic (BD ULTRA-FINE NEHAL PEN NEEDLES) 32 gauge x 5/32\" Ndle Use one pen daily to inject Lantus (insulin) and Victoza 100 each 3     selenium sulfide (SELSUN) 2.5 % lotion Leave on trunk and arms 10 min, then wash off.  Repeat 3-5 times per wek. 120 mL 1     sertraline (ZOLOFT) 50 MG tablet Take 1 tablet (50 mg total) by mouth daily. 30 tablet 6     No current facility-administered medications for this visit.                            "

## 2021-06-19 NOTE — PROGRESS NOTES
MTM Follow Up Encounter  Assessment & Plan                                                     Type 2 Diabetes:  improved. A1C not at goal of <7%, but improved. FBG unknown if at goal  mg/dL. showed him how to use his new meter-- when the strip was inserted, the time and date needed to be verified.  I verified the time and now the glucometer should work.  We will have him check blood sugars and return values at follow-up. Patient may need meal-time coverage in the future.   A1c, lipid panel, and microalbuminuria tested today.  PLAN:   1.  Start checking blood sugars 1-2 times daily.   2.  A1c, microalbuminuria, and lipid panel today.     Depression: We will defer to psych for medication changes, but I will call his pharmacy to ensure that sertraline is being refilled.  **last filled January 2018 - refilled     Vitamin D deficiency: Last vitamin D level not at goal.  Vitamin D level rechecked today  Plan:  1.  Vitamin D level today    Follow Up  1.5 months    Subjective & Objective                                                       Sanchez Muñoz is a 33 y.o. male coming in for a follow up visit for Medication Therapy Management.  [facility]  joins us today.     Chief Complaint: no new issues. Still in brother and sister in law's house.     Medication Adherence/Access: Brings all of his medications with him today.  Is missing sertraline.    Type 2 Diabetes: Currently taking metformin  mg ×4 (2000 mg) and Lantus 35 units nightly.   Tests BG 0 times daily.   Last A1c checked today = 8%, previously 10% on 4/24/2018.   Hypoglycemia none he reports  Has a One Touch verio flex meter. Cant use the new meter I gave him, does not think it is working.  Microalbumin checked today, results pending  Patient is on atorvastatin 80 mg.  He reports he is fasting today.    Depression: Does not have sertraline with him. Reports his mood depends, some days he thinks a lot. Has not noticed a significant change recently.   Reports that he recently saw psychiatry and no changes were made.     Vitamin D deficiency: Patient brings vitamin D 50,000 units once weekly with him today.  Last vitamin D level on 4/24/18 = 17, but he was not taking his medications at the time.    PMH: reviewed in EPIC   Allergies/ADRs: reviewed in EPIC   Alcohol: reviewed in EPIC   Tobacco:   History   Smoking Status     Never Smoker   Smokeless Tobacco     Never Used     Today's Vitals: There were no vitals filed for this visit.  ----------------    Much or all of the text in this note was generated through the use of Dragon Dictate voice-to-text software. Errors in spelling or words which seem out of context are unintentional. Sound alike errors, in particular, may have escaped editing.    The patient declined an after visit summary    I spent 15 minutes with this patient today;   All changes were made via collaborative practice agreement with Lisseth Ho MD. A copy of the visit note was provided to the patient's provider.     Gabby Gonsalez, Pharm.D., BCACP  Medication Therapy Management Pharmacist  [facility]     Current Outpatient Prescriptions   Medication Sig Dispense Refill     aspirin 81 MG EC tablet Take 1 tablet (81 mg total) by mouth daily. 90 tablet 3     atorvastatin (LIPITOR) 80 MG tablet Take 1 tablet (80 mg total) by mouth daily. 90 tablet 3     blood glucose meter (ONETOUCH ULTRA2) Use to check blood sugars twice daily. OneTouch Ultra machine to go with OneTouch Ultra strips. 1 each 0     blood glucose test strips Use 1 each As Directed as needed. Dispense brand per patient's insurance at pharmacy discretion. 200 strip prn     cane Abbie Use daily.  #1.  DX: Status post stroke, left hemiparesis 1 each 0     diclofenac sodium (VOLTAREN) 1 % Gel Use dosing card to apply 2 grams topically four times daily as needed 100 g 2     ergocalciferol (VITAMIN D2) 50,000 unit capsule Take 1 capsule (50,000 Units total) by  "mouth every 7 days. 4 capsule 2     generic lancets (FINGERSTIX LANCETS) Dispense brand per patient's insurance at pharmacy discretion. 200 each prn     insulin glargine (LANTUS; BASAGLAR) 100 unit/mL (3 mL) pen Inject 35 Units under the skin at bedtime. 15 mL 5     lisinopril (PRINIVIL,ZESTRIL) 10 MG tablet Take 1 tablet (10 mg total) by mouth daily. 90 tablet 3     metFORMIN (GLUCOPHAGE-XR) 500 MG 24 hr tablet Take 4 tablets (2,000 mg total) by mouth daily. 120 tablet 1     pen needle, diabetic (BD ULTRA-FINE NEHAL PEN NEEDLES) 32 gauge x 5/32\" Ndle Use one pen daily to inject Lantus (insulin) and Victoza 100 each 3     selenium sulfide (SELSUN) 2.5 % lotion Leave on trunk and arms 10 min, then wash off.  Repeat 3-5 times per wek. 120 mL 1     sertraline (ZOLOFT) 50 MG tablet Take 1 tablet (50 mg total) by mouth daily. 30 tablet 6     No current facility-administered medications for this visit.                            "

## 2021-06-19 NOTE — LETTER
Letter by Lisseth Ho MD at      Author: Lisseth Ho MD Service: -- Author Type: --    Filed:  Encounter Date: 8/5/2019 Status: (Other)         Sanchez Muñoz  [address] [place] [address]             August 5, 2019         Dear Mr. Muñoz,    Below are the results from your recent visit:    Resulted Orders   Glycosylated Hemoglobin A1c   Result Value Ref Range    Hemoglobin A1c 8.9 (H) 3.5 - 6.0 %   Comprehensive Metabolic Panel   Result Value Ref Range    Sodium 140 136 - 145 mmol/L    Potassium 4.1 3.5 - 5.0 mmol/L    Chloride 104 98 - 107 mmol/L    CO2 27 22 - 31 mmol/L    Anion Gap, Calculation 9 5 - 18 mmol/L    Glucose 205 (H) 70 - 125 mg/dL    BUN 14 8 - 22 mg/dL    Creatinine 0.84 0.70 - 1.30 mg/dL    GFR MDRD Af Amer >60 >60 mL/min/1.73m2    GFR MDRD Non Af Amer >60 >60 mL/min/1.73m2    Bilirubin, Total 0.7 0.0 - 1.0 mg/dL    Calcium 10.0 8.5 - 10.5 mg/dL    Protein, Total 7.3 6.0 - 8.0 g/dL    Albumin 4.3 3.5 - 5.0 g/dL    Alkaline Phosphatase 65 45 - 120 U/L    AST 17 0 - 40 U/L    ALT 29 0 - 45 U/L    Narrative    Fasting Glucose reference range is 70-99 mg/dL per  American Diabetes Association (ADA) guidelines.   Lipid Cascade   Result Value Ref Range    Cholesterol 263 (H) <=199 mg/dL    Triglycerides 461 (H) <=149 mg/dL    HDL Cholesterol 36 (L) >=40 mg/dL    LDL Calculated  <=129 mg/dL      Comment:      Invalid, Triglycerides >400    Patient Fasting > 8hrs? Unknown    Custom LDL Cholesterol, Direct   Result Value Ref Range    Direct  (H) <=129 mg/dl     COMMENT:   Cholesterol and triglycerides are still very elevated, continue with Lipitor and fibrate, stay hydrated  while taking these 2 medication to minimize risk of cramping, recheck in about 6 months.     Please call with questions or contact us using [employer].    Sincerely,        Electronically signed by Lisseth Ho MD

## 2021-06-20 NOTE — LETTER
Letter by Lisseth Ho MD at      Author: Lisseth Ho MD Service: -- Author Type: --    Filed:  Encounter Date: 9/25/2020 Status: (Other)         Sanchez Rosales  [place] [address]             September 25, 2020         Dear Mr. Muñoz,    Below are the results from your recent visit:    Resulted Orders   Glycosylated Hemoglobin A1c   Result Value Ref Range    Hemoglobin A1c 11.0 (H) <=5.6 %      Comment:      Normal <5.7% Prediabete 5.7-6.4% Diabletes 6.5% or higher - adopted from ADA consensus guidelines   Microalbumin, Random Urine   Result Value Ref Range    Microalbumin, Random Urine 35.24 (H) 0.00 - 1.99 mg/dL    Creatinine, Urine 134.8 mg/dL    Microalbumin/Creatinine Ratio Random Urine 261.4 (H) <=19.9 mg/g    Narrative    Microalbumin, Random Urine  <2.0 mg/dL . . . . . . . . Normal  3.0-30.0 mg/dL . . . . . . Microalbuminuria  >30.0 mg/dL . . . . . .  . Clinical Proteinuria    Microalbumin/Creatinine Ratio, Random Urine  <20 mg/g . . . . .. . . . Normal   mg/g . . . . . . . Microalbuminuria  >300 mg/g . . . . . . . . Clinical Proteinuria       Lipid Cascade   Result Value Ref Range    Cholesterol 319 (H) <=199 mg/dL    Triglycerides 916 (H) <=149 mg/dL    HDL Cholesterol 39 (L) >=40 mg/dL    LDL Calculated        Comment:      Invalid, Triglycerides >400    Patient Fasting > 8hrs? No    Comprehensive Metabolic Panel   Result Value Ref Range    Sodium 139 136 - 145 mmol/L    Potassium 4.2 3.5 - 5.0 mmol/L    Chloride 102 98 - 107 mmol/L    CO2 26 22 - 31 mmol/L    Anion Gap, Calculation 11 5 - 18 mmol/L    Glucose 268 (H) 70 - 125 mg/dL    BUN 21 8 - 22 mg/dL    Creatinine 1.15 0.70 - 1.30 mg/dL    GFR MDRD Af Amer >60 >60 mL/min/1.73m2    GFR MDRD Non Af Amer >60 >60 mL/min/1.73m2    Bilirubin, Total 0.4 0.0 - 1.0 mg/dL    Calcium 10.0 8.5 - 10.5 mg/dL    Protein, Total 7.9 6.0 - 8.0 g/dL    Albumin 4.4 3.5 - 5.0 g/dL    Alkaline Phosphatase 69 45 - 120 U/L    AST  14 0 - 40 U/L    ALT 31 0 - 45 U/L    Narrative    Fasting Glucose reference range is 70-99 mg/dL per  American Diabetes Association (ADA) guidelines.   Custom LDL Cholesterol, Direct   Result Value Ref Range    Direct  (H) <=129 mg/dl       Result did show poor control of diabetes, hyperlipidemia.  Taking the medication every day as directed will help make some improvement, as we discussed recheck in 3 months    Please call with questions or contact us using [employer].    Sincerely,        Electronically signed by Lisseth Ho MD

## 2021-06-20 NOTE — PROGRESS NOTES
MTM Follow Up Encounter  Assessment & Plan                                                     Type 2 Diabetes:  needs improvement. A1C not at goal of <7%. FBG overall not at goal  mg/dL. Will increase Lantus slightly to 37 units daily. Since patient has been off Victoza, will have him restart at 0.6 mg x 1 week then increase to 1.2 mg dialy. COuld be why appetite is improved. Will increase to 1.8 mg after month.   Reviewed elevated microalbuminuria ratio.  Will increase lisinopril today to 20 mg for renal protection and check a BMP at follow-up.  PLAN:   1. Increase Lantus to 37 units daily.   2.  Restart Victoza 0.6 mg ×1 week, then increase to 1.2 mg daily.  3.  Increase lisinopril to 20 mg daily  4.  BMP at follow-up    Depression: Continue to follow up with psych. Appears not completely controlled, but will defer medication adjustments to psych.     Hyperlipidemia: Patient is on a high-intensity statin, which is appropriate per 2013 ACC/AHA Cholesterol Guidelines due to stroke history.  With patient's elevated triglycerides and being on Victoza, he is at increased risk of pancreatitis.  Patient has had a long history of elevated triglycerides, therefore it may not be adherence related.  Blood sugars are likely contributing.  Therefore will initiate fenofibrate today to help lower triglycerides.  Will recheck cholesterol in 8 weeks. Normal renal function.   PLAN:   1.  Start fenofibrate 160 mg daily.   2.  recheck cholesterol in 8 weeks.     Vitamin D deficiency: Last vitamin D level remains not at goal, despite once weekly 50,000 unit vitamin D.  Will increase to twice weekly today and recheck vitamin D with future A1c.  Plan:  1.  Increase vitamin D to 50,000 units twice weekly  2.  Future vitamin D level    Follow Up  4 weeks      Subjective & Objective                                                       Sanchez Muñoz is a 33 y.o. male coming in for a follow up visit for Medication Therapy Management.  GREGORY  Alexia  joins us today.    Chief Complaint: None    Medication Adherence/Access: Patient brought all of his medications with him today.    Type 2 Diabetes: Currently taking Lantus 35 units daily and metformin  mg ×4 (2000 mg) daily. Reports that he ran out of Victoza -- 2 weeks ago. Needs a refill.    Blood sugars per glucometer:  Fasting AM = 210, 156, 212  9-10am = 233 (after BF), 172, 173, 169, 194, 166, 189, 174, 150, 176, 171, 168, 155  11am = 174, 180, 166, 154, 167  530pm = 235  7pm = 145  Last A1c checked 8/3/18 = 8%.   Hypoglycemia none he reports  Using a One Touch verio meter.   Microalbumin checked 8/3/2018.  Patient is on lisinopril 10 mg daily  Reports a change in appetite and eating more. He is not sure why he is eating more. Sometimes when he is depressed  Has gained 7 lbs   Walking still.     Depression: Currently taking sertraline 50 mg daily.  Every 2 weeks therapist. Psychiatrist every month. No medication changes at last psych appointment. Thinks mood is still the same and depressed because of the housing - has help from [place]. Has a hearing in Dec for Hypereight.     Hyperlipidemia: Currently taking atorvastatin 80 mg daily.  He reports that he is taking it.  He does not remember if he was fasting at last lipid check on 8/3/2018.  Of note trigs = 428    Vitamin D deficiency: Reports that he is taking vitamin D 50,000 units once weekly.  Vitamin D, Total (25-Hydroxy)   Date Value Ref Range Status   08/03/2018 21.2 (L) 30.0 - 80.0 ng/mL Final         PMH: reviewed in EPIC   Allergies/ADRs: reviewed in EPIC   Alcohol: reviewed in EPIC   Tobacco:   History   Smoking Status     Never Smoker   Smokeless Tobacco     Never Used     Today's Vitals: There were no vitals filed for this visit.  ----------------    Much or all of the text in this note was generated through the use of Dragon Dictate voice-to-text software. Errors in spelling or words which seem out of context are unintentional.  "Sound alike errors, in particular, may have escaped editing.    The patient was given a summary of these recommendations as an after visit summary    I spent 30 minutes with this patient today;   All changes were made via collaborative practice agreement with Lisseth Ho MD. A copy of the visit note was provided to the patient's provider.     Gabby Gonsalez, Pharm.D., [facility]  Medication Therapy Management Pharmacist  [facility]       Current Outpatient Prescriptions   Medication Sig Dispense Refill     aspirin 81 MG EC tablet Take 1 tablet (81 mg total) by mouth daily. 90 tablet 3     atorvastatin (LIPITOR) 80 MG tablet Take 1 tablet (80 mg total) by mouth daily. 90 tablet 3     blood glucose meter (ONETOUCH ULTRA2) Use to check blood sugars twice daily. OneTouch Ultra machine to go with OneTouch Ultra strips. 1 each 0     blood glucose test strips Use 1 each As Directed as needed. Dispense brand per patient's insurance at pharmacy discretion. 200 strip prn     cane Abbie Use daily.  #1.  DX: Status post stroke, left hemiparesis 1 each 0     diclofenac sodium (VOLTAREN) 1 % Gel Use dosing card to apply 2 grams topically four times daily as needed 100 g 2     ergocalciferol (VITAMIN D2) 50,000 unit capsule Take 1 capsule (50,000 Units total) by mouth every 7 days. 4 capsule 2     gabapentin (NEURONTIN) 300 MG capsule Take 300 mg by mouth daily.       generic lancets (FINGERSTIX LANCETS) Dispense brand per patient's insurance at pharmacy discretion. 200 each prn     insulin glargine (LANTUS; BASAGLAR) 100 unit/mL (3 mL) pen Inject 35 Units under the skin at bedtime. 15 mL 5     lisinopril (PRINIVIL,ZESTRIL) 10 MG tablet Take 1 tablet (10 mg total) by mouth daily. 90 tablet 3     metFORMIN (GLUCOPHAGE-XR) 500 MG 24 hr tablet Take 4 tablets (2,000 mg total) by mouth daily. 120 tablet 2     pen needle, diabetic (BD ULTRA-FINE NEHAL PEN NEEDLES) 32 gauge x 5/32\" Ndle Use one pen daily to " inject Lantus (insulin) and Victoza 100 each 3     selenium sulfide (SELSUN) 2.5 % lotion Leave on trunk and arms 10 min, then wash off.  Repeat 3-5 times per wek. 120 mL 1     sertraline (ZOLOFT) 50 MG tablet Take 1 tablet (50 mg total) by mouth daily. 30 tablet 6     VICTOZA 3-LAKESHA 0.6 mg/0.1 mL (18 mg/3 mL) PnIj injection INJECT 0.3 ML (1.8 MG TOTAL) UNDER THE SKIN DAILY. 9 Syringe 2     No current facility-administered medications for this visit.

## 2021-06-21 NOTE — LETTER
Letter by Lisseth Ho MD at      Author: Lisseth Ho MD Service: -- Author Type: --    Filed:  Encounter Date: 11/10/2020 Status: (Other)         November 10, 2020     Patient: Sanchez Muñoz   YOB: 1985   Date of Visit: 11/10/2020       To Whom It May Concern:    It is my medical opinion that Sanchez Muñoz multiple medical issues included insulin controlled diabetes type 2.    He is currently able to to safely operate a motor vehicle.    If you have any questions or concerns, please don't hesitate to call.    Sincerely,        Electronically signed by Lisseth Ho MD

## 2021-06-21 NOTE — PROGRESS NOTES
MTM Follow Up Encounter  Assessment & Plan                                                     Type 2 Diabetes:  improved. A1C was not at goal of <7%. FBG overall not at goal  mg/dL, but it has improved since last MTM appointment.  Will increase basaglar to 40 units and Victoza to 1.8 mg and recheck A1c in 1 month.  Will recheck microalbumin at follow-up.  Reviewed that his microalbumin is increasing, therefore he may not be taking lisinopril.  If he is taking lisinopril, will check a BMP today  PLAN:   1.  Increase basagalr to 40 units daily.   2.  Increase Victoza to 1.8 mg daily.   3.  BMP today    Hyperlipidemia: Reviewed that fenofibrate is for his cholesterol and this was a new medication, therefore he should not have had a previous bottle to be taking.  I believe that he is not taking this medication.  Encouraged him to take this medication as his triglycerides were very elevated.  We will recheck his fasting lipids in 1 month to verify adherence.    Vitamin D deficiency: Last vitamin D level was low, therefore vitamin D was increased to twice weekly.  Unclear if patient is actually taking it twice weekly, therefore will recheck a vitamin D level at MTM appointment in 1 month.    Follow Up  1 month      Subjective & Objective                                                       Sanchez Muñoz is a 33 y.o. male coming in for a follow up visit for Medication Therapy Management. A [employer]  joins us today.     Medication Adherence/Access: Brings all of his medications with him today.    Type 2 Diabetes: Currently taking metformin Exar 500 mg x4 (2000 mg) daily, basaglar 37 units daily and Victoza 1.2 mg daily.  He confirms these doses with me.  Tests BG 1 times daily. blood sugars per glucometer:   Fasting AM = 160, 171, 202, 152, 152, 177, 167, 165, 149, 153,   1pm = 148  4pm = 203  Last A1c checked 8/3/2018 =8%.   Hypoglycemia none  Using a One Touch verio meter.   Microalbumin checked 8/3/18.  At  last MTM appointment lisinopril increased to 20 mg.  He does have the bottle with him today.  Denies lightheadedness/dizziness.     Hyperlipidemia: Currently taking atorvastatin 80 mg daily and fenofibrate 160 mg daily.  Fenofibrate added at last MTM appointment on 9/21/2018.  Last triglycerides on 8 318 = 428.  He brings a full bottle of fenofibrate from 9/21/2018.  He reports he is taking the medication, and is taking it out of a previous bottle.    Vitamin D deficiency: At last MTM appointment vitamin D 50,000 IU was increased to twice weekly.  Last vitamin D level on 8 318 = 21.2.  He reports that he is taking vitamin D on Mondays and Fridays.      PMH: reviewed in EPIC   Allergies/ADRs: reviewed in EPIC   Alcohol: reviewed in EPIC   Tobacco:   History   Smoking Status     Never Smoker   Smokeless Tobacco     Never Used     Today's Vitals: There were no vitals filed for this visit.  ----------------    Much or all of the text in this note was generated through the use of Dragon Dictate voice-to-text software. Errors in spelling or words which seem out of context are unintentional. Sound alike errors, in particular, may have escaped editing.    The patient declined an after visit summary    I spent 15 minutes with this patient today;   All changes were made via collaborative practice agreement with Lisseth Ho MD. A copy of the visit note was provided to the patient's provider.     Gabby Gonsalez, Pharm.D., [facility]CP  Medication Therapy Management Pharmacist  [facility] and [facility]     Current Outpatient Prescriptions   Medication Sig Dispense Refill     aspirin 81 MG EC tablet Take 1 tablet (81 mg total) by mouth daily. 90 tablet 3     atorvastatin (LIPITOR) 80 MG tablet Take 1 tablet (80 mg total) by mouth daily. 90 tablet 3     blood glucose meter (ONETOUCH ULTRA2) Use to check blood sugars twice daily. OneTouch Ultra machine to go with OneTouch Ultra strips. 1 each 0     blood glucose test  "strips Use 1 each As Directed as needed. Dispense brand per patient's insurance at pharmacy discretion. 200 strip prn     cane Abbie Use daily.  #1.  DX: Status post stroke, left hemiparesis 1 each 0     diclofenac sodium (VOLTAREN) 1 % Gel Use dosing card to apply 2 grams topically four times daily as needed 100 g 2     ergocalciferol (VITAMIN D2) 50,000 unit capsule Take 1 capsule (50,000 Units total) by mouth 2 (two) times a week. 8 capsule 11     fenofibrate (TRIGLIDE) 160 MG tablet Take 1 tablet (160 mg total) by mouth daily. 30 tablet 2     gabapentin (NEURONTIN) 300 MG capsule Take 300 mg by mouth daily.       generic lancets (FINGERSTIX LANCETS) Dispense brand per patient's insurance at pharmacy discretion. 200 each prn     insulin glargine (LANTUS; BASAGLAR) 100 unit/mL (3 mL) pen Inject 37 Units under the skin at bedtime. 15 mL 5     lisinopril (PRINIVIL,ZESTRIL) 20 MG tablet Take 1 tablet (20 mg total) by mouth daily. 90 tablet 3     metFORMIN (GLUCOPHAGE-XR) 500 MG 24 hr tablet Take 4 tablets (2,000 mg total) by mouth daily. 120 tablet 2     pen needle, diabetic (BD ULTRA-FINE NEHAL PEN NEEDLES) 32 gauge x 5/32\" Ndle Use one pen daily to inject Lantus (insulin) and Victoza 100 each 3     selenium sulfide (SELSUN) 2.5 % lotion Leave on trunk and arms 10 min, then wash off.  Repeat 3-5 times per wek. 120 mL 1     sertraline (ZOLOFT) 50 MG tablet Take 1 tablet (50 mg total) by mouth daily. 30 tablet 6     VICTOZA 3-LAKESHA 0.6 mg/0.1 mL (18 mg/3 mL) PnIj injection INJECT 0.3 ML (1.8 MG TOTAL) UNDER THE SKIN DAILY. 9 Syringe 2     No current facility-administered medications for this visit.                            "

## 2021-06-21 NOTE — LETTER
Letter by Lisseth Ho MD at      Author: Lisseth Ho MD Service: -- Author Type: --    Filed:  Encounter Date: 10/26/2020 Status: (Other)         October 26, 2020     Patient: Sanchez Muñoz   YOB: 1985   Date of Visit: 10/26/2020       To Whom It May Concern:    It is my medical opinion that Sanchez Muñoz has multiple medical issues included diabetes type 2, hypertriglyceridemia, hypertension, history of stroke.    He is currently working with me for a better control of his diabetes type 2 and other chronic medical issues, and insulin treated diabetes mellitus form was completed on September 23, 2020, and will be renewed  on March 2021.    If you have any questions or concerns, please don't hesitate to call.    Sincerely,        Electronically signed by Lisseth Ho MD

## 2021-06-21 NOTE — LETTER
Letter by Lisseth Ho MD at      Author: Lisseth Ho MD Service: -- Author Type: --    Filed:  Encounter Date: 12/8/2020 Status: (Other)                    My Depression Action Plan  Name: Sanchez Muñoz   Date of Birth 1985  Date: 12/11/2020    My Doctor: Lisseth Ho MD   My Clinic: [address] [address]  [phone]          GREEN    ZONE   Good Control    What it looks like:     Things are going generally well. You have normal ups and downs. You may even feel depressed from time to time, but bad moods usually last less than a day.   What you need to do:  1. Continue to care for yourself (see self care plan)  2. Check your depression survival kit and update it as needed  3. Follow your physicians recommendations including any medication.  4. Do not stop taking medication unless you consult with your physician first.           YELLOW         ZONE Getting Worse    What it looks like:     Depression is starting to interfere with your life.     It may be hard to get out of bed; you may be starting to isolate yourself from others.    Symptoms of depression are starting to last most all day and this has happened for several days.     You may have suicidal thoughts but they are not constant.   What you need to do:     1. Call your care team. Your response to treatment will improve if you keep your care team informed of your progress. Yellow periods are signs an adjustment may need to be made.     2. Continue your self-care.  Just get dressed and ready for the day.  Don't give yourself time to talk yourself out of it.    3. Talk to someone in your support network.    4. Open up your depression Depression Self-Care Plan / Wellness kit.           RED    ZONE Medical Alert - Get Help    What it looks like:     Depression is seriously interfering with your life.     You may experience these or other symptoms: You cant get out of bed  most days, cant work or engage in other necessary activities, you have trouble taking care of basic hygiene, or basic responsibilities, thoughts of suicide or death that will not go away, self-injurious behavior.     What you need to do:  1. Call your care team and request a same-day appointment. If they are not available (weekends or after hours) call your local crisis line, emergency room or 911.            Self-Care Plan / Wellness Kit    Self-Care for Depression  Heres the deal. Your body and mind are really not as separate as most people think.  What you do and think affects how you feel and how you feel influences what you do and think. This means if you do things that people who feel good do, it will help you feel better.  Sometimes this is all it takes.  There is also a place for medication and therapy depending on how severe your depression is, so be sure to consult with your medical provider and/ or Behavioral Health Consultant if your symptoms are worsening or not improving.     In order to better manage my stress, I will:    Exercise  Get some form of exercise, every day. This will help reduce pain and release endorphins, the feel good chemicals in your brain. This is almost as good as taking antidepressants!  This is not the same as joining a gym and then never going! (they count on that by the way?) It can be as simple as just going for a walk or doing some gardening, anything that will get you moving.      Hygiene   Maintain good hygiene (get out of bed in the morning, make your bed, brush your teeth, take a shower, and get dressed like you were going to work, even if you are unemployed).  If your clothes don't fit try to get ones that do.    Diet  Strive to eat foods that are good for me, drink plenty of water, and avoid excessive sugar, caffeine, alcohol, and other mood-altering substances.  Some foods that are helpful in depression are: complex carbohydrates, B vitamins, flaxseed, fish or fish oil,  fresh fruits and vegetables.    Psychotherapy  Agree to participate in Individual Therapy (if recommended).    Medication  If prescribed medications, I agree to take them.  Missing doses can result in serious side effects.  I understand that drinking alcohol, or other illicit drug use, may cause potential side effects.  I will not stop my medication abruptly without first discussing it with my provider.    Staying Connected With Others  Stay in touch with my friends, family members, and my primary care provider/team.    Use your imagination  Be creative.  We all have a creative side; it doesnt matter if its oil painting, sand castles, or mud pies! This will also kick up the endorphins.    Witness Beauty  (AKA stop and smell the roses) Take a look outside, even in mid-winter. Notice colors, textures. Watch the squirrels and birds.     Service to others  Be of service to others.  There is always someone else in need.  By helping others we can get out of ourselves and remember the really important things.  This also provides opportunities for practicing all the other parts of the program.    Humor  Laugh and be silly!  Adjust your TV habits for less news and crime-drama and more comedy.    Control your stress  Try breathing deep, massage therapy, biofeedback, and meditation. Find time to relax each day.     Crisis Text Line  http://www.crisistextline.org    The Crisis Text Line serves anyone, in any type of crisis, providing access to free, 24/7 support and information via the medium people already use and trust:    Here's how it works:  1.  Text 272-866 from anywhere in the [place], anytime, about any type of crisis.  2.  A live, trained Crisis Counselor receives the text and responds quickly.  3.  The volunteer Crisis Counselor will help you move from a 'hot moment to a cool moment'.  My support system    Clinic Contact:  Phone number:    Contact 1:  Phone number:    Contact 2:  Phone number:    Congregation/spiritual  advisor:  Phone number:    Therapist:  Phone number:    [facility] center:    Phone number:    Other community support:  Phone number:

## 2021-06-22 NOTE — PROGRESS NOTES
MTM Follow Up Encounter  Assessment & Plan                                                     Type 2 Diabetes:  needs improvement and patient poorly compliant. A1C not at goal of <7%. FBG overall not at goal  mg/dL. Patient is likely taking Victoza once weekly. Per chart review, appears that he used to be on Trulicity, then was changed to Victoza by Dr. Ho. Will see if we can switch him back to Trulicity to avoid confusion. For now, I recommended that he take Victoza once daily. I will communicate with his  whether Trulicity is covered. Since he will be increasing Victoza to once a day, will leave his current insulin dose the same to avoid a drop in blood sugars.   Unclear if patient is taking atorvastatin at this time. Will call pharmacy and verify refill.   PLAN:   1. Take Victoza once DAILY (not once a week). I will see if Trulicity is covered, then will switch you back. If not covered, continue Victoza once daily. Can consider Ozempic in the future.    **Per pharmacy, Trulicity was not covered --Victoza, Bydureon, and Byetta preferred. Will start a PA for Trulicity.    **Per pharmacy, last refilled atorvastatin on 9/05/18 #30 -- refilled     Pain/Neuropathy: Appears that patient may not be taking gabapentin. Initially he declined pain and we considered holding off on restarting gabapentin. Then, he reported that when he walks he has pain on the bottom of his feet. In that case, will restart gabapentin. I will call his pharmacy and verify when he last refilled gabapentin.   **Per pharmacy, gabapentin last refilled April 2018.     Mood: Will verify with pharmacy that patient is refilling sertraline.   **Per pharmacy, last sertraline refilled 9/1/18 #30 -- refilled     Vitamin D Deficiency: Last Vitamin D level was low. Likely low due to poor adherence. Will call pharmacy and verify last refill.   **Per pharmacy, Last Vitamin D refilled 9/21/18 #8 -- refilled       Follow Up  1  month    Subjective & Objective                                                       Sanchez Muñoz is a 33 y.o. male coming in for a follow up visit for Medication Therapy Management. A [facility]  joins us today.     Chief Complaint: no new issues    Medication Adherence/Access: Brings some of his medications with him today: lisinopril 20 mg, metformin, fenofibrate, and aspirin. Reports that is is waiting to  the other medications. Key () picks up his medications for him (recieves a text when ready).     Type 2 Diabetes: Currently taking metformin  mg x4 (2000 mg) daily, Lantus 37 units daily (on file = 40 units daily) and Victoza. Patient reports that he is injecting Victoza once a week. He recognized the Victoza demo and admits that he gives on Fridays.   Tests BG 1 times daily fasting AM. Reports blood sugars: 174, 173, 169, 176, 183, 188, 160, 166, 181, 174.   Last A1c checked today = 9.1%. Previously 8%.    Using a OneTouch Verio Flex meter. Requests a refill of test strips. Has not checked since 11/10.   Microalbumin checked today, results pending.   Does not bring atorvastatin 80 mg daily today. Brings fenofibrate.   Is taking aspirin 81 mg for secondary prevention.     Pain/Neuropathy: Does not have gabapentin with him today. Reports that he ran out of some of his medications. At first says that he denies any pain. Reports that he needs it. Takes as needed. Reports lower back and bottom of his feet. When sittin down he is ok, but when stadning or walking he does feel a litle pain.     Mood: Does not have sertraline 50 mg daily with him today.  reports that he recently saw psych -- kept him on same dose. Thinks he needs to pick it up.     Vitamin D Deficiency: Does not have Vitamin D 50,000 IU twice weekly with him today. Believes he needs to pick it up.   Last Vitamin D level = 21.2 on 8/3/18        PMH: reviewed in EPIC   Allergies/ADRs: reviewed in EPIC   Alcohol:  reviewed in EPIC   Tobacco:   Social History     Tobacco Use   Smoking Status Never Smoker   Smokeless Tobacco Never Used     Today's Vitals: There were no vitals filed for this visit.  ----------------    Much or all of the text in this note was generated through the use of Dragon Dictate voice-to-text software. Errors in spelling or words which seem out of context are unintentional. Sound alike errors, in particular, may have escaped editing.    The patient declined an after visit summary    I spent 30 minutes with this patient today;   All changes were made via collaborative practice agreement with Lisseth Ho MD. A copy of the visit note was provided to the patient's provider.     Gabby Gonsalez, Pharm.D., [facility]CP  Medication Therapy Management Pharmacist  [facility] and [facility]     Current Outpatient Medications   Medication Sig Dispense Refill     aspirin 81 MG EC tablet Take 1 tablet (81 mg total) by mouth daily. 90 tablet 3     atorvastatin (LIPITOR) 80 MG tablet Take 1 tablet (80 mg total) by mouth daily. 90 tablet 3     blood glucose meter (ONETOUCH ULTRA2) Use to check blood sugars twice daily. OneTouch Ultra machine to go with OneTouch Ultra strips. 1 each 0     blood glucose test strips Use 1 each As Directed as needed. Dispense brand per patient's insurance at pharmacy discretion. 200 strip prn     cane Abbie Use daily.  #1.  DX: Status post stroke, left hemiparesis 1 each 0     diclofenac sodium (VOLTAREN) 1 % Gel Use dosing card to apply 2 grams topically four times daily as needed 100 g 2     ergocalciferol (VITAMIN D2) 50,000 unit capsule Take 1 capsule (50,000 Units total) by mouth 2 (two) times a week. 8 capsule 11     fenofibrate (TRIGLIDE) 160 MG tablet Take 1 tablet (160 mg total) by mouth daily. 30 tablet 2     gabapentin (NEURONTIN) 300 MG capsule Take 300 mg by mouth daily.       generic lancets (FINGERSTIX LANCETS) Dispense brand per patient's insurance at pharmacy  "discretion. 200 each prn     insulin glargine (LANTUS; BASAGLAR) 100 unit/mL (3 mL) pen Inject 40 Units under the skin at bedtime. 15 mL 5     lisinopril (PRINIVIL,ZESTRIL) 20 MG tablet Take 1 tablet (20 mg total) by mouth daily. 90 tablet 3     metFORMIN (GLUCOPHAGE-XR) 500 MG 24 hr tablet TAKE 4 TABLETS (2,000 MG TOTAL) BY MOUTH DAILY. 360 tablet 2     pen needle, diabetic (BD ULTRA-FINE NEHAL PEN NEEDLES) 32 gauge x 5/32\" Ndle Use one pen daily to inject Lantus (insulin) and Victoza 100 each 3     selenium sulfide (SELSUN) 2.5 % lotion Leave on trunk and arms 10 min, then wash off.  Repeat 3-5 times per wek. 120 mL 1     sertraline (ZOLOFT) 50 MG tablet Take 1 tablet (50 mg total) by mouth daily. 30 tablet 6     VICTOZA 3-LAKESHA 0.6 mg/0.1 mL (18 mg/3 mL) PnIj injection INJECT 0.3 ML (1.8 MG TOTAL) UNDER THE SKIN DAILY. 9 Syringe 2     No current facility-administered medications for this visit.                            "

## 2021-06-22 NOTE — PROGRESS NOTES
MTM Follow Up Encounter  Assessment & Plan                                                     Medication Adherence/Access: Patient has a history of non-adherence. Brought some bottles to visit today. Unsure if using all medications as prescribed. Will continue to review adherence at future visits.     Diabetes: Needs improvement. Patient is meeting A1c goal of <7% and Self-monitoring of blood glucose is not at goal of  (fasting). However blood sugars have significantly improved since last visit. Expect this is likely due to pt using Victoza daily as prescribed. Prior authorization for Trulicity was approved and should be available at pharmacy. Would benefit from weekly injection and will continue to track progress on blood sugars.   -Stop Victoza   -Start Trulicity 1.5 once weekly  -Will call to have Trulicity filled and refills of test strips and lancets    -Repeat A1C at follow-up     Hyperlipidemia: Needs improvement - Triglycerides and LDL were elevated in August. Patient reports adherence to medications. Would benefit from repeat lipid panel to better assess adherence to medications.     Pain/Neuropathy: Improved - symptoms well controlled per patient report. Will continue to monitor adherence to Gabapentin.     Mood: Stable per patient report     Vitamin D Deficiency: Unimproved - Last Vit D was below goal range. Patient reports he has been using supplements as prescribed. Would benefit from recheck of Vit D levels to assess adherence. Will plan to get with other labs at next follow-up.      Follow Up  MTM visit in 2 months - Scheduled for March 5th.         Subjective & Objective                                                       Sanchez Muñoz is a 33 y.o. male coming in for a follow up visit for Medication Therapy Management. He was referred to me from Lisseth Ho MD. A ong  joins us today.     Chief Complaint: Fenofibrate needs to be refilled, test strips, needles, trulicity   "    Medication Adherence/Access: Brings his medications with him today, but some are at home.     Diabetes:  Pt currently taking metformin 2 gram daily, victoza 1.8 mg daily, Lantus 37 units at bedtime. Ran out of test strips and lancets. Confirms that since last visit has switched from using Victoza once weekly to daily.       SMBG: once daily, fasting. Ranges (based on glucometer readings) : 101 to 144  Patient is experiencing hypoglycemia. Feels dizzy and weak. Not checking BG when that occurs   ACEi/ARB:  Lisinopril 20 mg daily   Aspirin: taking 81mg daily,         Hyperlipidemia: Atorvastatin 80 mg daily + Fenofibrate 160 mg daily. Patient asked if due for recheck of lipids and needs refill of Fenofibrate.        Direct LDL on 8/3/18 was 155     Lab Results   Component Value Date    HGBA1C 9.1 (H) 11/28/2018    HGBA1C 8.0 (H) 08/03/2018    HGBA1C 10.0 (H) 04/24/2018     Lab Results   Component Value Date    MICROALBUR 79.94 (H) 08/03/2018    LDLCALC  08/03/2018      Comment:      Invalid, Triglycerides >400    CREATININE 0.91 11/28/2018     Lab Results   Component Value Date    CHOL 269 (H) 08/03/2018    CHOL 261 (H) 04/24/2018    CHOL 223 (H) 01/22/2018     Lab Results   Component Value Date    HDL 42 08/03/2018    HDL 37 (L) 04/24/2018    HDL 37 (L) 01/22/2018     Lab Results   Component Value Date    LDLCALC  08/03/2018      Comment:      Invalid, Triglycerides >400    LDLCALC 146 (H) 04/24/2018    LDLCALC 119 01/22/2018     Lab Results   Component Value Date    TRIG 428 (H) 08/03/2018    TRIG 389 (H) 04/24/2018    TRIG 336 (H) 01/22/2018     No components found for: CHOLHDL    Neuropathy: Currently using Gabapentin 300 mg once daily. Continues to have tingling nerve pain that comes and goes.     Mood: Unsure if he has restarted Sertraline (at last visit had not been refilled). Feels like mood has improved. \"Not thinking a lot anymore.\" Recently went to court for SSI -  thinks he may qualify but will " find out in 2 months.     Vitamin D Deficiency: Using Vitamin D2 50,000 units twice weekly. Brings with him today.     Vitamin D, Total (25-Hydroxy)   Date Value Ref Range Status   11/28/2018 19.4 (L) 30.0 - 80.0 ng/mL Final         PMH: reviewed in EPIC   Allergies/ADRs: reviewed in EPIC   Alcohol: reviewed in EPIC   Tobacco:   Social History     Tobacco Use   Smoking Status Never Smoker   Smokeless Tobacco Never Used     Today's Vitals:   Vitals:    12/28/18 1523   Weight: 151 lb (68.5 kg)     ----------------    Much or all of the text in this note was generated through the use of Dragon Dictate voice-to-text software. Errors in spelling or words which seem out of context are unintentional. Sound alike errors, in particular, may have escaped editing.    The patient declined an after visit summary    I spent 30 minutes with this patient today;   All changes were made via collaborative practice agreement with Lisseth Ho MD. A copy of the visit note was provided to the patient's provider.     Gabby Gonsalez, Pharm.D., BCACP  Medication Therapy Management Pharmacist  [facility] and [facility]       Current Outpatient Medications   Medication Sig Dispense Refill     aspirin 81 MG EC tablet Take 1 tablet (81 mg total) by mouth daily. 90 tablet 3     atorvastatin (LIPITOR) 80 MG tablet Take 1 tablet (80 mg total) by mouth daily. 90 tablet 3     blood glucose meter (ONETOUCH ULTRA2) Use to check blood sugars twice daily. OneTouch Ultra machine to go with OneTouch Ultra strips. 1 each 0     blood glucose test strips Use 1 each As Directed as needed. Dispense brand per patient's insurance at pharmacy discretion. 200 strip prn     cane Abbie Use daily.  #1.  DX: Status post stroke, left hemiparesis 1 each 0     diclofenac sodium (VOLTAREN) 1 % Gel Use dosing card to apply 2 grams topically four times daily as needed 100 g 2     dulaglutide (TRULICITY) 1.5 mg/0.5 mL PnIj Inject 1.5 mg under the skin every  "7 days. 2 mL 2     ergocalciferol (VITAMIN D2) 50,000 unit capsule Take 1 capsule (50,000 Units total) by mouth 2 (two) times a week. 8 capsule 11     fenofibrate (TRIGLIDE) 160 MG tablet Take 1 tablet (160 mg total) by mouth daily. 30 tablet 2     gabapentin (NEURONTIN) 300 MG capsule Take 300 mg by mouth daily.       generic lancets (FINGERSTIX LANCETS) Dispense brand per patient's insurance at pharmacy discretion. 200 each prn     insulin glargine (LANTUS; BASAGLAR) 100 unit/mL (3 mL) pen Inject 37 Units under the skin at bedtime. 15 mL 5     lisinopril (PRINIVIL,ZESTRIL) 20 MG tablet Take 1 tablet (20 mg total) by mouth daily. 90 tablet 3     metFORMIN (GLUCOPHAGE-XR) 500 MG 24 hr tablet TAKE 4 TABLETS (2,000 MG TOTAL) BY MOUTH DAILY. 360 tablet 2     pen needle, diabetic (BD ULTRA-FINE NEHAL PEN NEEDLE) 32 gauge x 5/32\" Ndle Use one pen needle daily to inject Lantus (insulin) and Victoza. 100 each 3     selenium sulfide (SELSUN) 2.5 % lotion Leave on trunk and arms 10 min, then wash off.  Repeat 3-5 times per wek. 120 mL 1     sertraline (ZOLOFT) 50 MG tablet Take 1 tablet (50 mg total) by mouth daily. 30 tablet 6     VICTOZA 3-LAKESHA 0.6 mg/0.1 mL (18 mg/3 mL) PnIj injection INJECT 0.3 ML (1.8 MG TOTAL) UNDER THE SKIN DAILY. 9 Syringe 2     No current facility-administered medications for this visit.                              "

## 2021-06-23 NOTE — TELEPHONE ENCOUNTER
Refill Approved    Rx renewed per Medication Renewal Policy. Medication was last renewed on 9/21/18.    Paula Chowdary, [facility] Triage/Med Refill 1/31/2019     Requested Prescriptions   Pending Prescriptions Disp Refills     fenofibrate (TRIGLIDE) 160 MG tablet [Pharmacy Med Name: FENOFIBRATE 160 MG TABLET] 30 tablet 2     Sig: TAKE 1 TABLET BY MOUTH EVERY DAY    Fenofibrate Refill Protocol Passed - 1/30/2019  2:19 PM       Passed - Renal status in last 6 months    Creatinine   Date Value Ref Range Status   11/28/2018 0.91 0.70 - 1.30 mg/dL Final            Passed - Fasting lipid cascade in last 12 months    Cholesterol   Date Value Ref Range Status   08/03/2018 269 (H) <=199 mg/dL Final     Triglycerides   Date Value Ref Range Status   08/03/2018 428 (H) <=149 mg/dL Final     HDL Cholesterol   Date Value Ref Range Status   08/03/2018 42 >=40 mg/dL Final     LDL Calculated   Date Value Ref Range Status   08/03/2018  <=129 mg/dL Final     Comment:     Invalid, Triglycerides >400     Patient Fasting > 8hrs?   Date Value Ref Range Status   08/03/2018 No  Final            Passed - AST or ALT in last 12 months    AST   Date Value Ref Range Status   11/28/2018 19 0 - 40 U/L Final     ALT   Date Value Ref Range Status   11/28/2018 27 0 - 45 U/L Final              Passed - PCP or prescribing provider visit in past 12 months      Last office visit with prescriber/PCP: 6/27/2018 Lisseth Ho MD OR same dept: 6/27/2018 Lisseth Ho MD OR same specialty: 6/27/2018 Lisseth Ho MD  Last physical: Visit date not found Last MTM visit: Visit date not found   Next visit within 3 mo: Visit date not found  Next physical within 3 mo: Visit date not found  Prescriber OR PCP: Lisseth Ho MD  Last diagnosis associated with med order: 1. Hypertriglyceridemia  - fenofibrate (TRIGLIDE) 160 MG tablet [Pharmacy Med Name: FENOFIBRATE 160 MG TABLET]; TAKE 1 TABLET BY MOUTH EVERY DAY   Dispense: 30 tablet; Refill: 2    If protocol passes may refill for 12 months if within 3 months of last provider visit (or a total of 15 months).

## 2021-06-24 NOTE — PROGRESS NOTES
MTM Initial Encounter  Assessment & Plan                                                     Medication Adherence/Access: Surprised by improvement in A1c result, but I am still concerned that he is not taking his medications as prescribed. Will refill all of his medications and will continue to closely follow up.     Type 2 Diabetes:  improved. A1C at goal of <7%. FBG unknown if at goal  mg/dL. Surprised by result today. Encouraged him to continue what he is doing and emphasized adherence. Bring glucometer to follow up.      Hyperlipidemia: Would recommend rechecking lipids to ensure adherence, but he was not fasting today. Will check at follow up.     Stroke History: Stable. Recommended to continue current regimen.     Mood: Patient likely could benefit from an increase in sertraline, but he refuses. Will continue current dose for now.      Vitamin D Deficiency: Last Vitamin D level remained low. Will recheck today to verify adherence.   PLAN:   1. Vitamin D level today    Neuropathy: Will have patient stay off gabapentin since he is not complaining of significant symptoms.     Follow Up  3 months    Subjective & Objective                                                     Sanchez Muñoz is a 33 y.o. male coming in for an initial visit for Medication Therapy Management. Last MTM appt 12/28/18 therefore due to insurance, this is an initial visit. A [facility]  joins us today.     Medication Adherence/Access: Would like everything refilled. No transportation, so  picks up his medications for him. She usually gets a text or call when ready. He does not read, but he can tell from the bottles how many pills to take - is able to read numbers.   Brings medications with him today, but they are from 1/30/19. Missing gabapentin, Trulicity, and insulin.     Diabetes:  Pt currently taking metformin 2 gram daily, Trulicity 1.5 mg weekly, Lantus 37 units at bedtime.  At last MTM appt, switched patient back to  Trulicity from Victoza since he preferred once weekly. Reports last week and this week he has not received Trulicity.   No nausea, abdominal pain, or appetite changes.   Has been giving insulin every day -- reports 37 units.   Last A1c = 6.9% today, previously 9.1% on 11/28/18  SMBG: did not bring glucometer today. Reports  AM a few days ago  Hypoglycemia: sometimes two weeks ago. Did not check BG so he rests.   ACEi/ARB:  Lisinopril 20 mg daily. Last microalbuminuria 8/3/18     Hyperlipidemia: Currently taking Atorvastatin 80 mg daily + Fenofibrate 160 mg daily.   Last lipids checked 8/3/18. Is not fasting today.     Stroke History: Brings aspirin 81 mg daily with him today. Hx stroke 2013. Residual hemiparesis.    Mood: Currently taking sertraline 50 mg daily. Reports that it helps. Everything is ok but the depression is always there becase living brother and sister. Would like to continue dose. Therapist every 3 months. No suicidal thoughts.      Vitamin D Deficiency: Currently taking Vitamin D 50,000 twice weekly - confirms.   Vitamin D, Total (25-Hydroxy)   Date Value Ref Range Status   11/28/2018 19.4 (L) 30.0 - 80.0 ng/mL Final     Neuropathy: gabapentin 300 mg daily -- does not have with him today. Does not feel pain as much as before.       PMH: reviewed in EPIC   Allergies/ADRs: reviewed in EPIC   Alcohol: reviewed in EPIC   Tobacco:   Social History     Tobacco Use   Smoking Status Never Smoker   Smokeless Tobacco Never Used     Today's Vitals:   Vitals:    03/05/19 1014   Weight: 152 lb (68.9 kg)     ----------------    Much or all of the text in this note was generated through the use of Dragon Dictate voice-to-text software. Errors in spelling or words which seem out of context are unintentional. Sound alike errors, in particular, may have escaped editing.    The patient declined an after visit summary    I spent 30 minutes with this patient today.   All changes were made via collaborative  "practice agreement with Lisseth Ho MD. A copy of the visit note was provided to the patient's provider.     Gabby Gonsalez, PharmMaryjaneD., [facility]  Medication Therapy Management Pharmacist  [facility]     Current Outpatient Medications   Medication Sig Dispense Refill     aspirin 81 MG EC tablet Take 1 tablet (81 mg total) by mouth daily. 90 tablet 3     atorvastatin (LIPITOR) 80 MG tablet Take 1 tablet (80 mg total) by mouth daily. 90 tablet 3     blood glucose meter (ONETOUCH ULTRA2) Use to check blood sugars twice daily. OneTouch Ultra machine to go with OneTouch Ultra strips. 1 each 0     blood glucose test strips Use 1 each As Directed as needed. Dispense brand per patient's insurance at pharmacy discretion. 200 strip prn     cane Abbie Use daily.  #1.  DX: Status post stroke, left hemiparesis 1 each 0     diclofenac sodium (VOLTAREN) 1 % Gel Use dosing card to apply 2 grams topically four times daily as needed 100 g 2     dulaglutide (TRULICITY) 1.5 mg/0.5 mL PnIj Inject 1.5 mg under the skin every 7 days. 2 mL 2     ergocalciferol (VITAMIN D2) 50,000 unit capsule Take 1 capsule (50,000 Units total) by mouth 2 (two) times a week. 8 capsule 11     fenofibrate (TRIGLIDE) 160 MG tablet TAKE 1 TABLET BY MOUTH EVERY DAY 30 tablet 7     gabapentin (NEURONTIN) 300 MG capsule Take 300 mg by mouth daily.       generic lancets (FINGERSTIX LANCETS) Dispense brand per patient's insurance at pharmacy discretion. 200 each prn     insulin glargine (LANTUS; BASAGLAR) 100 unit/mL (3 mL) pen Inject 37 Units under the skin at bedtime. 15 mL 5     lisinopril (PRINIVIL,ZESTRIL) 20 MG tablet Take 1 tablet (20 mg total) by mouth daily. 90 tablet 3     metFORMIN (GLUCOPHAGE-XR) 500 MG 24 hr tablet TAKE 4 TABLETS (2,000 MG TOTAL) BY MOUTH DAILY. 360 tablet 2     pen needle, diabetic (BD ULTRA-FINE NEHAL PEN NEEDLE) 32 gauge x 5/32\" Ndle Use one pen needle daily to inject Lantus (insulin) and Victoza. 100 each " 3     selenium sulfide (SELSUN) 2.5 % lotion Leave on trunk and arms 10 min, then wash off.  Repeat 3-5 times per wek. 120 mL 1     sertraline (ZOLOFT) 50 MG tablet Take 1 tablet (50 mg total) by mouth daily. 30 tablet 6     No current facility-administered medications for this visit.

## 2021-06-24 NOTE — TELEPHONE ENCOUNTER
Reason contacted:  Pt  Information relayed:  Vitamin D level now normal. Likely patient's adherence has improved. Please call patient and let him know to continue taking vitamin D TWICE WEEKLY    Additional questions:  No  Further follow-up needed:  No  Okay to leave a detailed message:  No

## 2021-06-24 NOTE — TELEPHONE ENCOUNTER
----- Message from Gabby Gonsalez PharmD sent at 3/6/2019 12:29 PM CST -----  Vitamin D level now normal. Likely patient's adherence has improved. Please call patient and let him know to continue taking vitamin D TWICE WEEKLY.

## 2021-06-30 NOTE — PROGRESS NOTES
Progress Notes by Lisseth Ho MD at 4/12/2021  4:40 PM     Author: Lisseth Ho MD Service: -- Author Type: Physician    Filed: 4/13/2021  4:33 PM Encounter Date: 4/12/2021 Status: Signed    : Lisseth Ho MD (Physician)       OFFICE VISIT - FAMILY MEDICINE     ASSESSMENT AND PLAN     1. Type 2 diabetes mellitus with hyperglycemia, with long-term current use of insulin (H)  metFORMIN (GLUCOPHAGE) 1000 MG tablet    atorvastatin (LIPITOR) 80 MG tablet    aspirin 81 MG EC tablet    Comprehensive Metabolic Panel    Glycosylated Hemoglobin A1c    generic lancets (MICROLET LANCET)    blood-glucose meter Misc    blood glucose test strips   2. Stroke due to intracerebral hemorrhage, secondary to carotid artery dissection  aspirin 81 MG EC tablet   3. Essential hypertension with goal blood pressure less than 140/90  lisinopriL (PRINIVIL,ZESTRIL) 20 MG tablet   4. Blister of lip with infection, subsequent encounter  bacitracin 500 unit/gram ointment   Diabetes type 2, encourage patient to continue healthy lifestyle changes, regular physical activities, A1c mildly elevated today at 8%, continue to monitor and adjust medication accordingly.  History of stroke, discussed importance of compliance with therapy, risk factor modification tight blood pressure control.  Small blister on the right upper lip differential diagnosis discussed viral versus bacterial infection, lesion appeared crusted and mildly erythematous, topical antibiotic bacitracin advised and follow-up if not improved.    CHIEF COMPLAINT   Follow Up (depression and diabetes)    MORIAH Muñoz is a 35 y.o. male.  Established at [facility] 9/2014.  Previously at [facility].  Past history of stroke with left hemiparesis, type 2 diabetes mellitus, hypertension, obesity and severe recurrent major depression followed by counselor.  Following up on diabetes type 2 on oral medical condition, overall doing fine,  no complaints, unfortunately ran out of medication about a month ago, did not consult or call for refill.  Stating that he has been compliant with therapy last A1c was about 7%, has been following up with eye doctors, has not been checking his blood sugar causes glucometer is malfunctioning.    Review of Systems As per HPI, otherwise negative.    OBJECTIVE   /90   Pulse 69   Resp 16   Wt 154 lb (69.9 kg)   BMI 28.17 kg/m    Physical Exam   Constitutional: He is oriented to person, place, and time. He appears well-developed and well-nourished.   HENT:   Head: Normocephalic and atraumatic.       Neck: Normal range of motion. Neck supple. No JVD present. No tracheal deviation present. No thyromegaly present.   Cardiovascular: Normal rate, regular rhythm, normal heart sounds and intact distal pulses. Exam reveals no gallop and no friction rub.   No murmur heard.  Pulmonary/Chest: Effort normal and breath sounds normal. No respiratory distress. He has no wheezes. He has no rales.   Musculoskeletal:         General: No tenderness or edema.   Lymphadenopathy:     He has no cervical adenopathy.   Neurological: He is alert and oriented to person, place, and time. Coordination normal.   Psychiatric: He has a normal mood and affect. Judgment and thought content normal.       Formerly [facility]     Family History   Problem Relation Age of Onset   ? No Medical Problems Mother    ? No Medical Problems Father    ? No Medical Problems Sister    ? No Medical Problems Brother    ? No Medical Problems Maternal Grandmother    ? No Medical Problems Maternal Grandfather    ? No Medical Problems Paternal Grandmother    ? No Medical Problems Paternal Grandfather      Social History     Socioeconomic History   ? Marital status: Single     Spouse name: Not on file   ? Number of children: Not on file   ? Years of education: Not on file   ? Highest education level: Not on file   Occupational History   ? Not on file   Social Needs   ? Financial  resource strain: Not on file   ? Food insecurity     Worry: Not on file     Inability: Not on file   ? Transportation needs     Medical: Not on file     Non-medical: Not on file   Tobacco Use   ? Smoking status: Never Smoker   ? Smokeless tobacco: Never Used   Substance and Sexual Activity   ? Alcohol use: No   ? Drug use: No   ? Sexual activity: Not on file   Lifestyle   ? Physical activity     Days per week: Not on file     Minutes per session: Not on file   ? Stress: Not on file   Relationships   ? Social connections     Talks on phone: Not on file     Gets together: Not on file     Attends Jewish service: Not on file     Active member of club or organization: Not on file     Attends meetings of clubs or organizations: Not on file     Relationship status: Not on file   ? Intimate partner violence     Fear of current or ex partner: Not on file     Emotionally abused: Not on file     Physically abused: Not on file     Forced sexual activity: Not on file   Other Topics Concern   ? Not on file   Social History Narrative   ? Not on file     Relevant history was reviewed with the patient today, unless noted in HPI, nothing is pertinent for this visit.  [facility]     Patient Active Problem List    Diagnosis Date Noted   ? Hemiplegia of left dominant side as late effect of cerebral infarction, unspecified hemiplegia type (H) 07/29/2019   ? Non compliance with medical treatment 01/23/2018   ? Stenosis of right carotid artery 08/26/2017   ? History of right carotid artery dissection 08/26/2017   ? Vitamin D deficiency disease 08/06/2016   ? Hypertriglyceridemia 08/06/2016   ? Failure to attend appointment 08/10/2015     Overview Note:     8/10/2015 (Dr. Grijalva)     ? Insomnia 04/27/2015   ? Lumbar radiculopathy 04/27/2015   ? Low back pain 04/27/2015   ? Neck pain 04/27/2015   ? Tinea versicolor 04/27/2015   ? Stroke due to intracerebral hemorrhage, secondary to carotid artery dissection 04/27/2015     Overview Note:      Right cerebrovascular accident secondary to a right internal carotid artery dissection.  Surgical procedure recommended by Neurologist declined.     ? Mixed hyperlipidemia    ? Severe episode of recurrent major depressive disorder, without psychotic features (H)      Overview Note:     Replacement Utility updated for latest IMO load     ? Blurry Vision    ? Essential hypertension with goal blood pressure less than 140/90      Overview Note:     Replacement Utility updated for latest IMO load     ? Fatigue    ? Hemiparesis Of Left Side      Overview Note:     Replacement Utility updated for latest IMO load     ? History of ischemic stroke without residual deficits    ? Type 2 diabetes mellitus with hyperglycemia, with long-term current use of insulin (H)      Overview Note:     Diagnosed at time of stroke, 2013.     ? Vitamin D Deficiency      Overview Note:     Replacement Utility updated for latest IMO load       No past surgical history on file.    RESULTS/CONSULTS (Lab/Rad)     Recent Results (from the past 168 hour(s))   Comprehensive Metabolic Panel   Result Value Ref Range    Sodium 139 136 - 145 mmol/L    Potassium 4.0 3.5 - 5.0 mmol/L    Chloride 103 98 - 107 mmol/L    CO2 25 22 - 31 mmol/L    Anion Gap, Calculation 11 5 - 18 mmol/L    Glucose 168 (H) 70 - 125 mg/dL    BUN 18 8 - 22 mg/dL    Creatinine 0.85 0.70 - 1.30 mg/dL    GFR MDRD Af Amer >60 >60 mL/min/1.73m2    GFR MDRD Non Af Amer >60 >60 mL/min/1.73m2    Bilirubin, Total 0.5 0.0 - 1.0 mg/dL    Calcium 9.7 8.5 - 10.5 mg/dL    Protein, Total 8.4 (H) 6.0 - 8.0 g/dL    Albumin 4.6 3.5 - 5.0 g/dL    Alkaline Phosphatase 68 45 - 120 U/L    AST 15 0 - 40 U/L    ALT 28 0 - 45 U/L   Glycosylated Hemoglobin A1c   Result Value Ref Range    Hemoglobin A1c 8.1 (H) <=5.6 %     No results found.  MEDICATIONS     Current Outpatient Medications on File Prior to Visit   Medication Sig Dispense Refill   ? ammonium lactate (LAC-HYDRIN) 12 % lotion      ? dulaglutide  "(TRULICITY) 1.5 mg/0.5 mL PnIj Inject 1.5 mg under the skin every 7 days. For blood sugars 6 mL 6   ? ergocalciferol (VITAMIN D2) 1,250 mcg (50,000 unit) capsule Take 1 capsule (50,000 Units total) by mouth once a week. For low Vitamin D 12 capsule 0   ? generic lancets (FINGERSTIX LANCETS) Use to check blood sugars twice daily. OneTouch Delica. Dispense brand per patient's insurance at pharmacy discretion. 100 each 11   ? insulin glargine (BASAGLAR KWIKPEN) 100 unit/mL (3 mL) pen Inject 37 Units under the skin daily. Please write directions in Hmong 45 mL 3   ? pen needle, diabetic (BD ULTRA-FINE NEHAL PEN NEEDLE) 32 gauge x 5/32\" Ndle Use one pen needle daily to inject Lantus (insulin) 100 each 3   ? selenium sulfide (SELSUN) 2.5 % lotion Leave on trunk and arms 10 min, then wash off.  Repeat 3-5 times per wek. 120 mL 1   ? sertraline (ZOLOFT) 50 MG tablet TAKE 1 TABLET DAILY FOR DEPRESSION // 1 HNUB NOJ 1 LUB PAB CHASE NYUAB SIAB 30 tablet 6     No current facility-administered medications on file prior to visit.        HEALTH MAINTENANCE / SCREENING   PHQ-2 Total Score: 2 (4/12/2021  4:53 PM)  , PHQ-9 Total Score: 3 (4/12/2021  4:53 PM)  ,No data recorded  Immunization History   Administered Date(s) Administered   ? Influenza,seasonal quad, PF 09/23/2014   ? Tdap 10/05/2016     Health Maintenance   Topic   ? DEPRESSION ACTION PLAN    ? Pneumococcal Vaccine: Pediatrics (0 to 5 Years) and At-Risk Patients (6 to 64 Years) (1 of 2 - PPSV23)   ? COVID-19 Vaccine (1)   ? MEDICARE ANNUAL WELLNESS VISIT    ? HEPATITIS B VACCINES (2 of 2 - CpG risk 2-dose series)   ? INFLUENZA VACCINE RULE BASED (1)   ? LIPID    ? A1C    ? DIABETIC FOOT EXAM    ? DIABETIC EYE EXAM    ? MICROALBUMIN    ? BMP    ? ADVANCE CARE PLANNING    ? TD 18+ HE    ? HEPATITIS C SCREENING    ? HIV SCREENING    ? TDAP ADULT ONE TIME DOSE      Review of external notes as documented above     25 minutes spent on the date of the encounter doing chart review, " review of outside records, review of test results, interpretation of tests, patient visit and documentation     I was present with the medical student (Estuardo Carney,MS3) who participated in the service and in the documentation of the note. I have verified the history and personally performed the physical exam and medical decision making. I agree with the assessment and plan of care as documented in the note above.    ----- Services Performed by a MEDICAL STUDENT in Presence of ATTENDING Physician-------    Lisseth Ho MD  [facility]     This note was dictated using a voice recognition software.  Any grammatical or context distortion are unintentional and inherent to the software.

## 2021-07-03 NOTE — ADDENDUM NOTE
Addendum Note by Lisseth Ho MD at 7/29/2019  9:40 AM     Author: Lisseth Ho MD Service: -- Author Type: Physician    Filed: 7/31/2019  6:36 PM Encounter Date: 7/29/2019 Status: Signed    : Lisseth Ho MD (Physician)    Addended by: LISSETH HO on: 7/31/2019 06:36 PM        Modules accepted: Orders

## 2024-07-08 ENCOUNTER — HOSPITAL ENCOUNTER (INPATIENT)
Facility: HOSPITAL | Age: 39
LOS: 1 days | Discharge: HOME | DRG: 066 | End: 2024-07-10
Attending: EMERGENCY MEDICINE | Admitting: INTERNAL MEDICINE
Payer: MEDICARE

## 2024-07-08 ENCOUNTER — APPOINTMENT (OUTPATIENT)
Dept: MRI IMAGING | Facility: HOSPITAL | Age: 39
DRG: 066 | End: 2024-07-08
Attending: EMERGENCY MEDICINE
Payer: MEDICARE

## 2024-07-08 ENCOUNTER — APPOINTMENT (OUTPATIENT)
Dept: CT IMAGING | Facility: HOSPITAL | Age: 39
DRG: 066 | End: 2024-07-08
Attending: EMERGENCY MEDICINE
Payer: MEDICARE

## 2024-07-08 DIAGNOSIS — I63.9 CEREBROVASCULAR ACCIDENT (CVA), UNSPECIFIED MECHANISM (H): ICD-10-CM

## 2024-07-08 DIAGNOSIS — I65.21 STENOSIS OF RIGHT CAROTID ARTERY: Primary | ICD-10-CM

## 2024-07-08 DIAGNOSIS — I63.89 AC ISCH MULTI VASC TERRITORIES STROKE (H): ICD-10-CM

## 2024-07-08 DIAGNOSIS — R42 DIZZINESS: ICD-10-CM

## 2024-07-08 LAB
ANION GAP SERPL CALCULATED.3IONS-SCNC: 12 MMOL/L (ref 7–15)
BASOPHILS # BLD AUTO: 0.1 10E3/UL (ref 0–0.2)
BASOPHILS NFR BLD AUTO: 1 %
BUN SERPL-MCNC: 19.2 MG/DL (ref 6–20)
CALCIUM SERPL-MCNC: 10.2 MG/DL (ref 8.6–10)
CHLORIDE SERPL-SCNC: 100 MMOL/L (ref 98–107)
CREAT SERPL-MCNC: 1.11 MG/DL (ref 0.67–1.17)
DEPRECATED HCO3 PLAS-SCNC: 30 MMOL/L (ref 22–29)
EGFRCR SERPLBLD CKD-EPI 2021: 86 ML/MIN/1.73M2
EOSINOPHIL # BLD AUTO: 0.4 10E3/UL (ref 0–0.7)
EOSINOPHIL NFR BLD AUTO: 4 %
ERYTHROCYTE [DISTWIDTH] IN BLOOD BY AUTOMATED COUNT: 11 % (ref 10–15)
FLUAV RNA SPEC QL NAA+PROBE: NEGATIVE
FLUBV RNA RESP QL NAA+PROBE: NEGATIVE
GLUCOSE SERPL-MCNC: 151 MG/DL (ref 70–99)
HCT VFR BLD AUTO: 40.1 % (ref 40–53)
HGB BLD-MCNC: 14 G/DL (ref 13.3–17.7)
IMM GRANULOCYTES # BLD: 0 10E3/UL
IMM GRANULOCYTES NFR BLD: 0 %
LYMPHOCYTES # BLD AUTO: 3.4 10E3/UL (ref 0.8–5.3)
LYMPHOCYTES NFR BLD AUTO: 35 %
MCH RBC QN AUTO: 29.9 PG (ref 26.5–33)
MCHC RBC AUTO-ENTMCNC: 34.9 G/DL (ref 31.5–36.5)
MCV RBC AUTO: 86 FL (ref 78–100)
MONOCYTES # BLD AUTO: 0.6 10E3/UL (ref 0–1.3)
MONOCYTES NFR BLD AUTO: 6 %
NEUTROPHILS # BLD AUTO: 5.1 10E3/UL (ref 1.6–8.3)
NEUTROPHILS NFR BLD AUTO: 53 %
NRBC # BLD AUTO: 0 10E3/UL
NRBC BLD AUTO-RTO: 0 /100
PLATELET # BLD AUTO: 330 10E3/UL (ref 150–450)
POTASSIUM SERPL-SCNC: 4 MMOL/L (ref 3.4–5.3)
RBC # BLD AUTO: 4.69 10E6/UL (ref 4.4–5.9)
RSV RNA SPEC NAA+PROBE: NEGATIVE
SARS-COV-2 RNA RESP QL NAA+PROBE: NEGATIVE
SODIUM SERPL-SCNC: 142 MMOL/L (ref 135–145)
TROPONIN T SERPL HS-MCNC: <6 NG/L
WBC # BLD AUTO: 9.7 10E3/UL (ref 4–11)

## 2024-07-08 PROCEDURE — 96361 HYDRATE IV INFUSION ADD-ON: CPT

## 2024-07-08 PROCEDURE — 99285 EMERGENCY DEPT VISIT HI MDM: CPT | Mod: 25

## 2024-07-08 PROCEDURE — 80048 BASIC METABOLIC PNL TOTAL CA: CPT | Performed by: EMERGENCY MEDICINE

## 2024-07-08 PROCEDURE — 84484 ASSAY OF TROPONIN QUANT: CPT | Performed by: EMERGENCY MEDICINE

## 2024-07-08 PROCEDURE — 80061 LIPID PANEL: CPT | Performed by: INTERNAL MEDICINE

## 2024-07-08 PROCEDURE — 87637 SARSCOV2&INF A&B&RSV AMP PRB: CPT | Performed by: EMERGENCY MEDICINE

## 2024-07-08 PROCEDURE — 250N000013 HC RX MED GY IP 250 OP 250 PS 637: Performed by: EMERGENCY MEDICINE

## 2024-07-08 PROCEDURE — 96360 HYDRATION IV INFUSION INIT: CPT | Mod: 59

## 2024-07-08 PROCEDURE — 36415 COLL VENOUS BLD VENIPUNCTURE: CPT | Performed by: EMERGENCY MEDICINE

## 2024-07-08 PROCEDURE — 83036 HEMOGLOBIN GLYCOSYLATED A1C: CPT | Performed by: INTERNAL MEDICINE

## 2024-07-08 PROCEDURE — 84443 ASSAY THYROID STIM HORMONE: CPT | Performed by: INTERNAL MEDICINE

## 2024-07-08 PROCEDURE — 255N000002 HC RX 255 OP 636: Performed by: EMERGENCY MEDICINE

## 2024-07-08 PROCEDURE — 85025 COMPLETE CBC W/AUTO DIFF WBC: CPT | Performed by: EMERGENCY MEDICINE

## 2024-07-08 PROCEDURE — 250N000011 HC RX IP 250 OP 636: Performed by: EMERGENCY MEDICINE

## 2024-07-08 PROCEDURE — 70553 MRI BRAIN STEM W/O & W/DYE: CPT

## 2024-07-08 PROCEDURE — 84439 ASSAY OF FREE THYROXINE: CPT | Performed by: INTERNAL MEDICINE

## 2024-07-08 PROCEDURE — 93005 ELECTROCARDIOGRAM TRACING: CPT | Performed by: EMERGENCY MEDICINE

## 2024-07-08 PROCEDURE — A9585 GADOBUTROL INJECTION: HCPCS | Performed by: EMERGENCY MEDICINE

## 2024-07-08 PROCEDURE — 258N000003 HC RX IP 258 OP 636: Performed by: EMERGENCY MEDICINE

## 2024-07-08 PROCEDURE — 70496 CT ANGIOGRAPHY HEAD: CPT

## 2024-07-08 RX ORDER — MECLIZINE HYDROCHLORIDE 25 MG/1
25 TABLET ORAL 3 TIMES DAILY PRN
Qty: 20 TABLET | Refills: 0 | Status: SHIPPED | OUTPATIENT
Start: 2024-07-08 | End: 2024-08-29

## 2024-07-08 RX ORDER — GADOBUTROL 604.72 MG/ML
7 INJECTION INTRAVENOUS ONCE
Status: COMPLETED | OUTPATIENT
Start: 2024-07-08 | End: 2024-07-08

## 2024-07-08 RX ORDER — MECLIZINE HYDROCHLORIDE 25 MG/1
25 TABLET ORAL ONCE
Status: COMPLETED | OUTPATIENT
Start: 2024-07-08 | End: 2024-07-08

## 2024-07-08 RX ORDER — IOPAMIDOL 755 MG/ML
67 INJECTION, SOLUTION INTRAVASCULAR ONCE
Status: COMPLETED | OUTPATIENT
Start: 2024-07-08 | End: 2024-07-08

## 2024-07-08 RX ADMIN — MECLIZINE HYDROCHLORIDE 25 MG: 25 TABLET ORAL at 19:02

## 2024-07-08 RX ADMIN — GADOBUTROL 7 ML: 604.72 INJECTION INTRAVENOUS at 22:16

## 2024-07-08 RX ADMIN — IOPAMIDOL 67 ML: 755 INJECTION, SOLUTION INTRAVENOUS at 18:57

## 2024-07-08 RX ADMIN — SODIUM CHLORIDE 1000 ML: 9 INJECTION, SOLUTION INTRAVENOUS at 19:01

## 2024-07-08 ASSESSMENT — ACTIVITIES OF DAILY LIVING (ADL)
ADLS_ACUITY_SCORE: 35

## 2024-07-08 NOTE — ED PROVIDER NOTES
EMERGENCY DEPARTMENT ENCOUNTER      NAME: Sanchez Muñoz  AGE: 39 year old male  YOB: 1985  MRN: [id number]  EVALUATION DATE & TIME: 2024  5:18 PM    PCP: Lisseth Ho    ED PROVIDER: Kenrick Doherty M.D.      Chief Complaint   Patient presents with    Headache    Dizziness         FINAL IMPRESSION:  1. Dizziness          ED COURSE & MEDICAL DECISION MAKIN year old male presents to the Emergency Department for evaluation of vertigo.  Patient has a history of hypertension, diabetes, hyperlipidemia, and a previous stroke without any residual deficits.  Presents with symptoms of intermittent headaches, dizziness and lightheadedness for about a week or more.  He arrives to the emergency department moderately hypertensive but otherwise vitally stable and nontoxic-appearing.  He is able to participate in a full neuroexam and seems symmetric including coordination testing.  He underwent initial lab and imaging evaluation.  This included an EKG was sinus rhythm, stable metabolic profile, initial CTA does show intracranial atherosclerosis but no CT evidence of hemorrhage mass or acute stroke.  He is pended for follow-up on a brain MRI to more definitively exclude acute stroke.  Patient feeling a bit better after some IV fluid and meclizine.  Will plan for follow-up on neuroimaging and disposition as appropriate.  If negative, could potentially discharged with urgent primary care referral versus admit if pertinent findings on MRI.  Patient signed out to oncoming ED provider Dr. Clark.    At the conclusion of the encounter I discussed the results of all of the tests and the disposition. The questions were answered. The patient or family acknowledged understanding and was agreeable with the care plan.       Medical Decision Making  Obtained supplemental history:Supplemental history obtained?: Documented in chart  Reviewed external records: External records reviewed?: Documented in chart  Care impacted  by chronic illness:Diabetes, Hyperlipidemia, and Hypertension  Care significantly affected by social determinants of health:N/A  Did you consider but not order tests?: Work up considered but not performed and documented in chart, if applicable  Did you interpret images independently?: Independent interpretation of ECG and images noted in documentation, when applicable.  Consultation discussion with other provider:Did you involve another provider (consultant, , pharmacy, etc.)?: I discussed the care with another health care provider, see documentation for details.  Admission considered. Patient was signed out to the oncoming physician, disposition pending.        MEDICATIONS GIVEN IN THE EMERGENCY:  Medications   sodium chloride 0.9% BOLUS 1,000 mL (0 mLs Intravenous Stopped 7/8/24 2040)   meclizine (ANTIVERT) tablet 25 mg (25 mg Oral $Given 7/8/24 1902)   iopamidol (ISOVUE-370) solution 67 mL (67 mLs Intravenous $Given 7/8/24 1857)   gadobutrol (GADAVIST) injection 7 mL (7 mLs Intravenous $Given 7/8/24 2216)       NEW PRESCRIPTIONS STARTED AT TODAY'S ER VISIT  New Prescriptions    MECLIZINE (ANTIVERT) 25 MG TABLET    Take 1 tablet (25 mg) by mouth 3 times daily as needed for dizziness          =================================================================    HPI    Patient information was obtained from: Patient's family member     Use of : Yes, in person from family member. Language - Alexia        Sanchez Muñoz is a 39 year old male with a pertinent history of hypertension, hyperlipidemia, and diabetes who presents to this ED by walk in for evaluation of headache and dizziness.     Per family member reports the patient has been having dizziness and light-headedness for 1.5 weeks. Per family notes the patient has had a loss of appetite and has been taking tylenol and some tea for his symptoms, but it has not helped. Patient describes the dizziness to make him weak and his vision blurry like he is unable to  see clearly and is going to have a black out.     REVIEW OF SYSTEMS   All systems reviewed and negative except as noted in HPI.    PAST MEDICAL HISTORY:  Past Medical History:   Diagnosis Date    Anxiety     Cerebrovascular accident (CVA) involving anterior circulation of right side (H)     hemorrhagic due to right ica dissection per clinic notes    Hypertension     Hypertriglyceridemia     Lumbar radiculopathy     Tinea versicolor     Uncontrolled diabetes mellitus     on insulin    Vitamin D deficiency        PAST SURGICAL HISTORY:  Past Surgical History:   Procedure Laterality Date    IR MISCELLANEOUS PROCEDURE  8/24/2013    IR MISCELLANEOUS PROCEDURE  8/24/2013    IR MISCELLANEOUS PROCEDURE  8/24/2013    IR MISCELLANEOUS PROCEDURE  8/26/2013    IR MISCELLANEOUS PROCEDURE  8/26/2013    IR MISCELLANEOUS PROCEDURE  9/3/2013           CURRENT MEDICATIONS:    No current facility-administered medications for this encounter.     Current Outpatient Medications   Medication Sig Dispense Refill    meclizine (ANTIVERT) 25 MG tablet Take 1 tablet (25 mg) by mouth 3 times daily as needed for dizziness 20 tablet 0    ammonium lactate (LAC-HYDRIN) 12 % lotion [AMMONIUM LACTATE (LAC-HYDRIN) 12 % LOTION]       aspirin 81 MG EC tablet [ASPIRIN 81 MG EC TABLET] Take 1 tablet (81 mg total) by mouth daily. To prevent stroke 90 tablet 6    atorvastatin (LIPITOR) 80 MG tablet [ATORVASTATIN (LIPITOR) 80 MG TABLET] TAKE 1 TABLET BY MOUTH DAILY FOR CHOLESTEROL // IB HNUB NOJ 1 LUB PAB CHASE NTSHAV MUAJ ROJ 30 tablet 6    blood glucose test strips [BLOOD GLUCOSE TEST STRIPS] Dispense brand per patient's insurance at pharmacy discretion. 70 strip 12    blood-glucose meter Misc [BLOOD-GLUCOSE METER MISC] Use 1 Device As Directed daily. Okay to substitute with any glucometer and supply covered by insurance. 1 each prn    dulaglutide (TRULICITY) 1.5 mg/0.5 mL PnIj [DULAGLUTIDE (TRULICITY) 1.5 MG/0.5 ML PNIJ] Inject 1.5 mg under the skin every 7  "days. For blood sugars 6 mL 6    ergocalciferol (VITAMIN D2) 1,250 mcg (50,000 unit) capsule [ERGOCALCIFEROL (VITAMIN D2) 1,250 MCG (50,000 UNIT) CAPSULE] Take 1 capsule (50,000 Units total) by mouth once a week. For low Vitamin D 12 capsule 0    generic lancets (FINGERSTIX LANCETS) [GENERIC LANCETS (FINGERSTIX LANCETS)] Use to check blood sugars twice daily. OneTouch Delica. Dispense brand per patient's insurance at pharmacy discretion. 100 each 11    generic lancets (MICROLET LANCET) [GENERIC LANCETS (MICROLET LANCET)] Dispense brand per patient's insurance at pharmacy discretion. 50 each 12    insulin glargine (BASAGLAR KWIKPEN) 100 unit/mL (3 mL) pen [INSULIN GLARGINE (BASAGLAR KWIKPEN) 100 UNIT/ML (3 ML) PEN] Inject 37 Units under the skin daily. Please write directions in Hmong 45 mL 3    lisinopriL (PRINIVIL,ZESTRIL) 20 MG tablet [LISINOPRIL (PRINIVIL,ZESTRIL) 20 MG TABLET] TAKE 1 TABLET DAILY FOR BLOOD PRESSURE OR KIDNEY // 1 HNUB NOJ 1 LUB PAB CHASE NTSHAV SIAB LO LUB RAUM. 30 tablet 6    metFORMIN (GLUCOPHAGE) 1000 MG tablet [METFORMIN (GLUCOPHAGE) 1000 MG TABLET] Take 1 tablet (1,000 mg total) by mouth 2 (two) times a day with meals. 180 tablet 3    pen needle, diabetic (BD ULTRA-FINE NEHAL PEN NEEDLE) 32 gauge x 5/32\" Ndle [PEN NEEDLE, DIABETIC (BD ULTRA-FINE NEHAL PEN NEEDLE) 32 GAUGE X 5/32\" NDLE] Use one pen needle daily to inject Lantus (insulin) 100 each 3    selenium sulfide (SELSUN) 2.5 % lotion [SELENIUM SULFIDE (SELSUN) 2.5 % LOTION] Leave on trunk and arms 10 min, then wash off.  Repeat 3-5 times per wek. 120 mL 1    sertraline (ZOLOFT) 50 MG tablet [SERTRALINE (ZOLOFT) 50 MG TABLET] TAKE 1 TABLET DAILY FOR DEPRESSION // 1 HNUB NOJ 1 LUB PAB CHASE NYUAB SIAB 30 tablet 6         ALLERGIES:  No Known Allergies    FAMILY HISTORY:  Family History   Problem Relation Age of Onset    No Known Problems Mother     No Known Problems Father     No Known Problems Sister     No Known Problems Brother     No Known " "Problems Maternal Grandmother     No Known Problems Maternal Grandfather     No Known Problems Paternal Grandmother     No Known Problems Paternal Grandfather        SOCIAL HISTORY:   Social History     Socioeconomic History    Marital status: Single   Tobacco Use    Smoking status: Never    Smokeless tobacco: Never   Substance and Sexual Activity    Alcohol use: No    Drug use: No     Social Determinants of Health      [facility]    Social Connections       VITALS:  /80   Pulse 65   Temp 97.8  F (36.6  C) (Oral)   Resp 21   Ht 1.575 m (5' 2\")   Wt 70.3 kg (155 lb)   SpO2 97%   BMI 28.35 kg/m      PHYSICAL EXAM    Constitutional: Well developed, Well nourished, NAD.  HENT: Normocephalic, Atraumatic. Neck Supple.  Eyes: EOMI, Conjunctiva normal.  Respiratory: Breathing comfortably on room air. Speaks full sentences easily. Lungs clear to ascultation.  Cardiovascular: Normal heart rate, Regular rhythm. No peripheral edema.  Abdomen: Soft, nontender  Musculoskeletal: Good range of motion in all major joints. No major deformities noted.  Integument: Warm, Dry.  Neurologic: Fully awake, alert, oriented.  Face is symmetric.  Speech is normal.  Cranial nerves II through XII intact.  Strength is 5 out of 5 throughout bilateral upper and lower extremities.  Sensation to light touch intact x4.  Finger-nose-finger testing is normal.  Patient is ambulatory.  Psychiatric: Cooperative. Affect appropriate.     LAB:  All pertinent labs reviewed and interpreted.  Labs Ordered and Resulted from Time of ED Arrival to Time of ED Departure   BASIC METABOLIC PANEL - Abnormal       Result Value    Sodium 142      Potassium 4.0      Chloride 100      Carbon Dioxide (CO2) 30 (*)     Anion Gap 12      Urea Nitrogen 19.2      Creatinine 1.11      GFR Estimate 86      Calcium 10.2 (*)     Glucose 151 (*)    TROPONIN T, HIGH SENSITIVITY - " Normal    Troponin T, High Sensitivity <6     INFLUENZA A/B, RSV, & SARS-COV2 PCR - Normal    Influenza A PCR Negative      Influenza B PCR Negative      RSV PCR Negative      SARS CoV2 PCR Negative     CBC WITH PLATELETS AND DIFFERENTIAL    WBC Count 9.7      RBC Count 4.69      Hemoglobin 14.0      Hematocrit 40.1      MCV 86      MCH 29.9      MCHC 34.9      RDW 11.0      Platelet Count 330      % Neutrophils 53      % Lymphocytes 35      % Monocytes 6      % Eosinophils 4      % Basophils 1      % Immature Granulocytes 0      NRBCs per 100 WBC 0      Absolute Neutrophils 5.1      Absolute Lymphocytes 3.4      Absolute Monocytes 0.6      Absolute Eosinophils 0.4      Absolute Basophils 0.1      Absolute Immature Granulocytes 0.0      Absolute NRBCs 0.0         RADIOLOGY:  Reviewed all pertinent imaging. Please see official radiology report.  CTA Head Neck with Contrast   Final Result   IMPRESSION:    HEAD CT:   1.  No intracranial hemorrhage, mass lesions, hydrocephalus or CT evidence for an acute infarct. MRI is more sensitive for the evaluation of acute infarcts.   2.  Mild diffuse cerebral parenchymal volume loss. Presumed chronic hypertensive/microvascular ischemic white matter changes.   3.  Small focus of encephalomalacia involving the lateral right thalamus.      HEAD CTA:   1.  High-grade narrowing of the distal extracranial internal carotid artery, worsened since prior head CTA 08/24/2013 and MRA 08/26/2017.   2.  Redemonstration of moderate narrowing of the M1 segment of the right middle cerebral artery, similar to the prior exam.   3.  Multifocal moderate narrowing of the A2 and A3 segments of the anterior cerebral arteries. Mild to moderate narrowing of the supraclinoid left internal carotid artery.      NECK CTA:   1.  High-grade critical narrowing of the right internal carotid artery starting at the origin to the skull base, worsened since previous MRA 08/26/2017.   2.  No hemodynamically  significant narrowing of the left internal carotid artery based on the NASCET criteria.   3.  The vertebral arteries are patent throughout their course in the neck.         MR Brain w/o & w Contrast    (Results Pending)       EKG:    Performed at: 7/8/24, 17:23    Impression: Sinus rhythm, rightward axis, borderline ECG.    Rate: 70 BPM  Rhythm: Sinus rhythm  Axis: 94  KS Interval: 150 ms  QRS Interval: 92 ms  QTc Interval: 414 ms  ST Changes: None  Comparison: When comparing with ECG of 8/24/13, no significant change was found.     I have independently reviewed and interpreted the EKG(s) documented above.      I, Jennifer Muñoz, am serving as a scribe to document services personally performed by Dr. Kenrick Doherty, based on my observation and the provider's statements to me. I, Kenrick Doherty MD attest that Jennifer Muñoz is acting in a scribe capacity, has observed my performance of the services and has documented them in accordance with my direction.    Kenrick Doherty M.D.  Emergency Medicine  [facility] EMERGENCY DEPARTMENT  [address] [place] [id number] [phone]  Dept: [phone]       Kenrick Doherty MD  07/08/24 4300

## 2024-07-08 NOTE — ED TRIAGE NOTES
Dizziness and headache x10 days. Hx of stroke, hypertension, diabetes and high cholesterol     Over past 10 days pt has felt dizziness like the room is spinning, and feeling like he will faint. Also notes some vision changes - dark clouds in vision. Denies weakness. Denies deficits from previous stroke.

## 2024-07-09 ENCOUNTER — APPOINTMENT (OUTPATIENT)
Dept: PHYSICAL THERAPY | Facility: HOSPITAL | Age: 39
DRG: 066 | End: 2024-07-09
Attending: INTERNAL MEDICINE
Payer: MEDICARE

## 2024-07-09 ENCOUNTER — APPOINTMENT (OUTPATIENT)
Dept: SPEECH THERAPY | Facility: HOSPITAL | Age: 39
DRG: 066 | End: 2024-07-09
Attending: INTERNAL MEDICINE
Payer: MEDICARE

## 2024-07-09 ENCOUNTER — APPOINTMENT (OUTPATIENT)
Dept: CARDIOLOGY | Facility: HOSPITAL | Age: 39
DRG: 066 | End: 2024-07-09
Attending: INTERNAL MEDICINE
Payer: MEDICARE

## 2024-07-09 ENCOUNTER — OFFICE VISIT (OUTPATIENT)
Dept: INTERPRETER SERVICES | Facility: CLINIC | Age: 39
End: 2024-07-09

## 2024-07-09 ENCOUNTER — APPOINTMENT (OUTPATIENT)
Dept: ULTRASOUND IMAGING | Facility: HOSPITAL | Age: 39
DRG: 066 | End: 2024-07-09
Attending: PSYCHIATRY & NEUROLOGY
Payer: MEDICARE

## 2024-07-09 ENCOUNTER — APPOINTMENT (OUTPATIENT)
Dept: OCCUPATIONAL THERAPY | Facility: HOSPITAL | Age: 39
DRG: 066 | End: 2024-07-09
Attending: INTERNAL MEDICINE
Payer: MEDICARE

## 2024-07-09 PROBLEM — I63.89: Status: ACTIVE | Noted: 2024-07-09

## 2024-07-09 PROBLEM — R42 DIZZINESS: Status: ACTIVE | Noted: 2024-07-09

## 2024-07-09 LAB
ALBUMIN SERPL BCG-MCNC: 4.3 G/DL (ref 3.5–5.2)
ALP SERPL-CCNC: 62 U/L (ref 40–150)
ALT SERPL W P-5'-P-CCNC: 24 U/L (ref 0–70)
ANION GAP SERPL CALCULATED.3IONS-SCNC: 9 MMOL/L (ref 7–15)
AST SERPL W P-5'-P-CCNC: 15 U/L (ref 0–45)
ATRIAL RATE - MUSE: 70 BPM
BASOPHILS # BLD AUTO: 0.1 10E3/UL (ref 0–0.2)
BASOPHILS NFR BLD AUTO: 1 %
BILIRUB SERPL-MCNC: 0.6 MG/DL
BUN SERPL-MCNC: 14.7 MG/DL (ref 6–20)
CALCIUM SERPL-MCNC: 9.6 MG/DL (ref 8.6–10)
CHLORIDE SERPL-SCNC: 100 MMOL/L (ref 98–107)
CHOLEST SERPL-MCNC: 226 MG/DL
CREAT SERPL-MCNC: 0.99 MG/DL (ref 0.67–1.17)
DEPRECATED HCO3 PLAS-SCNC: 31 MMOL/L (ref 22–29)
DIASTOLIC BLOOD PRESSURE - MUSE: NORMAL MMHG
EGFRCR SERPLBLD CKD-EPI 2021: >90 ML/MIN/1.73M2
EOSINOPHIL # BLD AUTO: 0.4 10E3/UL (ref 0–0.7)
EOSINOPHIL NFR BLD AUTO: 5 %
ERYTHROCYTE [DISTWIDTH] IN BLOOD BY AUTOMATED COUNT: 11 % (ref 10–15)
GLUCOSE BLDC GLUCOMTR-MCNC: 121 MG/DL (ref 70–99)
GLUCOSE BLDC GLUCOMTR-MCNC: 146 MG/DL (ref 70–99)
GLUCOSE BLDC GLUCOMTR-MCNC: 150 MG/DL (ref 70–99)
GLUCOSE BLDC GLUCOMTR-MCNC: 157 MG/DL (ref 70–99)
GLUCOSE SERPL-MCNC: 144 MG/DL (ref 70–99)
HBA1C MFR BLD: 8.1 %
HCT VFR BLD AUTO: 40.5 % (ref 40–53)
HDLC SERPL-MCNC: 34 MG/DL
HGB BLD-MCNC: 14 G/DL (ref 13.3–17.7)
IMM GRANULOCYTES # BLD: 0 10E3/UL
IMM GRANULOCYTES NFR BLD: 0 %
INTERPRETATION ECG - MUSE: NORMAL
LDLC SERPL CALC-MCNC: ABNORMAL MG/DL
LVEF ECHO: NORMAL
LYMPHOCYTES # BLD AUTO: 2.6 10E3/UL (ref 0.8–5.3)
LYMPHOCYTES NFR BLD AUTO: 30 %
MCH RBC QN AUTO: 29.9 PG (ref 26.5–33)
MCHC RBC AUTO-ENTMCNC: 34.6 G/DL (ref 31.5–36.5)
MCV RBC AUTO: 87 FL (ref 78–100)
MONOCYTES # BLD AUTO: 0.6 10E3/UL (ref 0–1.3)
MONOCYTES NFR BLD AUTO: 7 %
NEUTROPHILS # BLD AUTO: 5.1 10E3/UL (ref 1.6–8.3)
NEUTROPHILS NFR BLD AUTO: 58 %
NONHDLC SERPL-MCNC: 192 MG/DL
NRBC # BLD AUTO: 0 10E3/UL
NRBC BLD AUTO-RTO: 0 /100
P AXIS - MUSE: 62 DEGREES
PLATELET # BLD AUTO: 298 10E3/UL (ref 150–450)
POTASSIUM SERPL-SCNC: 4.1 MMOL/L (ref 3.4–5.3)
PR INTERVAL - MUSE: 150 MS
PROT SERPL-MCNC: 7.6 G/DL (ref 6.4–8.3)
QRS DURATION - MUSE: 92 MS
QT - MUSE: 384 MS
QTC - MUSE: 414 MS
R AXIS - MUSE: 94 DEGREES
RBC # BLD AUTO: 4.68 10E6/UL (ref 4.4–5.9)
SODIUM SERPL-SCNC: 140 MMOL/L (ref 135–145)
SYSTOLIC BLOOD PRESSURE - MUSE: NORMAL MMHG
T AXIS - MUSE: 14 DEGREES
T4 FREE SERPL-MCNC: 1.01 NG/DL (ref 0.9–1.7)
TRIGL SERPL-MCNC: 407 MG/DL
TROPONIN T SERPL HS-MCNC: 6 NG/L
TROPONIN T SERPL HS-MCNC: 6 NG/L
TSH SERPL DL<=0.005 MIU/L-ACNC: 2.62 UIU/ML (ref 0.3–4.2)
VENTRICULAR RATE- MUSE: 70 BPM
WBC # BLD AUTO: 8.8 10E3/UL (ref 4–11)

## 2024-07-09 PROCEDURE — 97165 OT EVAL LOW COMPLEX 30 MIN: CPT | Mod: GO

## 2024-07-09 PROCEDURE — 92610 EVALUATE SWALLOWING FUNCTION: CPT | Mod: GN

## 2024-07-09 PROCEDURE — 97530 THERAPEUTIC ACTIVITIES: CPT | Mod: GO

## 2024-07-09 PROCEDURE — 250N000013 HC RX MED GY IP 250 OP 250 PS 637: Performed by: INTERNAL MEDICINE

## 2024-07-09 PROCEDURE — T1013 SIGN LANG/ORAL INTERPRETER: HCPCS | Mod: U3 | Performed by: INTERPRETER

## 2024-07-09 PROCEDURE — 999N000208 ECHOCARDIOGRAM COMPLETE

## 2024-07-09 PROCEDURE — 93880 EXTRACRANIAL BILAT STUDY: CPT

## 2024-07-09 PROCEDURE — 97161 PT EVAL LOW COMPLEX 20 MIN: CPT | Mod: GP

## 2024-07-09 PROCEDURE — 250N000013 HC RX MED GY IP 250 OP 250 PS 637: Performed by: EMERGENCY MEDICINE

## 2024-07-09 PROCEDURE — 97116 GAIT TRAINING THERAPY: CPT | Mod: GP

## 2024-07-09 PROCEDURE — 250N000011 HC RX IP 250 OP 636: Performed by: INTERNAL MEDICINE

## 2024-07-09 PROCEDURE — 36415 COLL VENOUS BLD VENIPUNCTURE: CPT | Performed by: INTERNAL MEDICINE

## 2024-07-09 PROCEDURE — 250N000013 HC RX MED GY IP 250 OP 250 PS 637: Performed by: HOSPITALIST

## 2024-07-09 PROCEDURE — 250N000013 HC RX MED GY IP 250 OP 250 PS 637: Performed by: PSYCHIATRY & NEUROLOGY

## 2024-07-09 PROCEDURE — 99223 1ST HOSP IP/OBS HIGH 75: CPT | Performed by: INTERNAL MEDICINE

## 2024-07-09 PROCEDURE — 93306 TTE W/DOPPLER COMPLETE: CPT | Mod: 26 | Performed by: INTERNAL MEDICINE

## 2024-07-09 PROCEDURE — 99223 1ST HOSP IP/OBS HIGH 75: CPT | Performed by: PSYCHIATRY & NEUROLOGY

## 2024-07-09 PROCEDURE — 85025 COMPLETE CBC W/AUTO DIFF WBC: CPT | Performed by: INTERNAL MEDICINE

## 2024-07-09 PROCEDURE — 82040 ASSAY OF SERUM ALBUMIN: CPT | Performed by: INTERNAL MEDICINE

## 2024-07-09 PROCEDURE — 84484 ASSAY OF TROPONIN QUANT: CPT | Performed by: INTERNAL MEDICINE

## 2024-07-09 PROCEDURE — 82962 GLUCOSE BLOOD TEST: CPT

## 2024-07-09 PROCEDURE — 120N000001 HC R&B MED SURG/OB

## 2024-07-09 RX ORDER — AMOXICILLIN 250 MG
2 CAPSULE ORAL 2 TIMES DAILY PRN
Status: DISCONTINUED | OUTPATIENT
Start: 2024-07-09 | End: 2024-07-10 | Stop reason: HOSPADM

## 2024-07-09 RX ORDER — AMOXICILLIN 250 MG
1 CAPSULE ORAL 2 TIMES DAILY PRN
Status: DISCONTINUED | OUTPATIENT
Start: 2024-07-09 | End: 2024-07-10 | Stop reason: HOSPADM

## 2024-07-09 RX ORDER — ONDANSETRON 4 MG/1
4 TABLET, ORALLY DISINTEGRATING ORAL EVERY 6 HOURS PRN
Status: DISCONTINUED | OUTPATIENT
Start: 2024-07-09 | End: 2024-07-09

## 2024-07-09 RX ORDER — ONDANSETRON 2 MG/ML
4 INJECTION INTRAMUSCULAR; INTRAVENOUS EVERY 6 HOURS PRN
Status: DISCONTINUED | OUTPATIENT
Start: 2024-07-09 | End: 2024-07-09

## 2024-07-09 RX ORDER — ATORVASTATIN CALCIUM 80 MG/1
80 TABLET, FILM COATED ORAL EVERY MORNING
COMMUNITY

## 2024-07-09 RX ORDER — LISINOPRIL 20 MG/1
20 TABLET ORAL EVERY MORNING
COMMUNITY

## 2024-07-09 RX ORDER — ATORVASTATIN CALCIUM 40 MG/1
80 TABLET, FILM COATED ORAL EVERY EVENING
Status: DISCONTINUED | OUTPATIENT
Start: 2024-07-09 | End: 2024-07-10 | Stop reason: HOSPADM

## 2024-07-09 RX ORDER — FLUTICASONE PROPIONATE 50 MCG
1 SPRAY, SUSPENSION (ML) NASAL DAILY
Status: DISCONTINUED | OUTPATIENT
Start: 2024-07-09 | End: 2024-07-10 | Stop reason: HOSPADM

## 2024-07-09 RX ORDER — ONDANSETRON 2 MG/ML
4 INJECTION INTRAMUSCULAR; INTRAVENOUS EVERY 6 HOURS PRN
Status: DISCONTINUED | OUTPATIENT
Start: 2024-07-09 | End: 2024-07-10 | Stop reason: HOSPADM

## 2024-07-09 RX ORDER — CALCIUM CARBONATE 500 MG/1
1000 TABLET, CHEWABLE ORAL 4 TIMES DAILY PRN
Status: DISCONTINUED | OUTPATIENT
Start: 2024-07-09 | End: 2024-07-10 | Stop reason: HOSPADM

## 2024-07-09 RX ORDER — LIDOCAINE 40 MG/G
CREAM TOPICAL
Status: DISCONTINUED | OUTPATIENT
Start: 2024-07-09 | End: 2024-07-09

## 2024-07-09 RX ORDER — ONDANSETRON 4 MG/1
4 TABLET, ORALLY DISINTEGRATING ORAL EVERY 6 HOURS PRN
Status: DISCONTINUED | OUTPATIENT
Start: 2024-07-09 | End: 2024-07-10 | Stop reason: HOSPADM

## 2024-07-09 RX ORDER — ASPIRIN 81 MG/1
81 TABLET, CHEWABLE ORAL DAILY
Status: DISCONTINUED | OUTPATIENT
Start: 2024-07-09 | End: 2024-07-10 | Stop reason: HOSPADM

## 2024-07-09 RX ORDER — CLOPIDOGREL BISULFATE 75 MG/1
300 TABLET ORAL ONCE
Status: COMPLETED | OUTPATIENT
Start: 2024-07-09 | End: 2024-07-09

## 2024-07-09 RX ORDER — CLOPIDOGREL BISULFATE 75 MG/1
75 TABLET ORAL DAILY
Status: DISCONTINUED | OUTPATIENT
Start: 2024-07-09 | End: 2024-07-10 | Stop reason: HOSPADM

## 2024-07-09 RX ORDER — LIDOCAINE 40 MG/G
CREAM TOPICAL
Status: DISCONTINUED | OUTPATIENT
Start: 2024-07-09 | End: 2024-07-10 | Stop reason: HOSPADM

## 2024-07-09 RX ORDER — DOXYCYCLINE 100 MG/1
100 CAPSULE ORAL EVERY 12 HOURS SCHEDULED
Status: DISCONTINUED | OUTPATIENT
Start: 2024-07-09 | End: 2024-07-10 | Stop reason: HOSPADM

## 2024-07-09 RX ADMIN — FAMOTIDINE 20 MG: 10 INJECTION, SOLUTION INTRAVENOUS at 12:15

## 2024-07-09 RX ADMIN — ASPIRIN 81 MG CHEWABLE TABLET 81 MG: 81 TABLET CHEWABLE at 08:34

## 2024-07-09 RX ADMIN — FAMOTIDINE 20 MG: 10 INJECTION, SOLUTION INTRAVENOUS at 01:18

## 2024-07-09 RX ADMIN — DOXYCYCLINE 100 MG: 100 CAPSULE ORAL at 19:54

## 2024-07-09 RX ADMIN — FLUTICASONE PROPIONATE 1 SPRAY: 50 SPRAY, METERED NASAL at 08:35

## 2024-07-09 RX ADMIN — ATORVASTATIN CALCIUM 80 MG: 40 TABLET, FILM COATED ORAL at 19:54

## 2024-07-09 RX ADMIN — CLOPIDOGREL BISULFATE 300 MG: 75 TABLET ORAL at 01:18

## 2024-07-09 RX ADMIN — CLOPIDOGREL BISULFATE 75 MG: 75 TABLET ORAL at 08:34

## 2024-07-09 RX ADMIN — DOXYCYCLINE 100 MG: 100 CAPSULE ORAL at 08:34

## 2024-07-09 ASSESSMENT — ACTIVITIES OF DAILY LIVING (ADL)
ADLS_ACUITY_SCORE: 35
ADLS_ACUITY_SCORE: 19
ADLS_ACUITY_SCORE: 35
ADLS_ACUITY_SCORE: 19
ADLS_ACUITY_SCORE: 35
ADLS_ACUITY_SCORE: 19
ADLS_ACUITY_SCORE: 35
ADLS_ACUITY_SCORE: 35
ADLS_ACUITY_SCORE: 19
ADLS_ACUITY_SCORE: 19
DEPENDENT_IADLS:: INDEPENDENT
ADLS_ACUITY_SCORE: 35
ADLS_ACUITY_SCORE: 19
ADLS_ACUITY_SCORE: 35
ADLS_ACUITY_SCORE: 19
ADLS_ACUITY_SCORE: 35
ADLS_ACUITY_SCORE: 35

## 2024-07-09 NOTE — CONSULTS
NEUROLOGY CONSULTATION NOTE     Sanchez Muñoz,  1985, MRN [id number] Date: 2024     [facility]   Code status:  Full Code   PCP: Mark Crockett, [phone]      ASSESSMENT & PLAN     Diagnosis:  Acute ischemic stroke of right caudate and internal capsule and temporal lobe due to other etiology (artery to artery embolism)     Recommendations :  - Permissive HTN; goal SBP < 220 mmHg  - Daily aspirin 81 mg for secondary stroke prevention  - Plavix (clopidogrel) 75 mg PO Daily, DAPT for 90 days followed by ASA monotherapy  - LDL unmeasurable. Aggressive cholesterol management recommended (resume prev-prescribed Lipitor, ordered)  - TTE is unremarkable  - Carotid US with critical RACHEL stenosis, consider Vascular consult either IP or OP  - Telemetry, neuro checks.  Consider 30-day cardiac monitor at discharge  - A1c: 8.1%. Glucose management per hospital team  - Headache management per hospital team  - PT/OT/SLP  - Stroke Education. May need help with med compliance as OP  - Neurology will sign off.  Patient should be seen in neurology clinic in 2-3 months       Chief Complaint   Patient presents with    Headache    Dizziness        HISTORY OF PRESENT ILLNESS     We have been requested by Dr. Rebolledo to evaluate Sanchez Muñoz who is a 39 year old  male for stroke. Patient presented to the [facility] ED on  with headaches/dizziness x1 week. No focal deficits on ED exam. Stroke team was initially consulted.     Sanchez says he is feeling a little better. Dizziness is less intense, but worse with movement. No headache.  I ask about why he has not been taking his medications and he confirms that he ran out but is not sure why he has not refilled these.  I ask about cost and he does not have a comment about this.  Brother is at bedside.    Previous history of stroke.    Sanchez denies any family history of clotting disorders.  No miscarriages in siblings.     PAST MEDICAL & SURGICAL HISTORY     Medical History  Past  Medical History:   Diagnosis Date    Anxiety     Cerebrovascular accident (CVA) involving anterior circulation of right side (H)     hemorrhagic due to right ica dissection per clinic notes    Hypertension     Hypertriglyceridemia     Lumbar radiculopathy     Tinea versicolor     Uncontrolled diabetes mellitus     on insulin    Vitamin D deficiency      Surgical History  Past Surgical History:   Procedure Laterality Date    IR MISCELLANEOUS PROCEDURE  8/24/2013    IR MISCELLANEOUS PROCEDURE  8/24/2013    IR MISCELLANEOUS PROCEDURE  8/24/2013    IR MISCELLANEOUS PROCEDURE  8/26/2013    IR MISCELLANEOUS PROCEDURE  8/26/2013    IR MISCELLANEOUS PROCEDURE  9/3/2013        SOCIAL HISTORY     Social History      FAMILY HISTORY     Reviewed, and family history includes No Known Problems in his brother, father, maternal grandfather, maternal grandmother, mother, paternal grandfather, paternal grandmother, and sister.     ALLERGIES     No Known Allergies     REVIEW OF SYSTEMS     Pertinent items are noted in HPI.     HOME & HOSPITAL MEDICATIONS     Prior to Admission Medications  (Not in a hospital admission)      Hospital Medications  Current Facility-Administered Medications   Medication Dose Route Frequency Provider Last Rate Last Admin    aspirin (ASA) chewable tablet 81 mg  81 mg Oral Daily Sakina Rebolledo MD   81 mg at 07/09/24 0834    clopidogrel (PLAVIX) tablet 75 mg  75 mg Oral Daily Sakina Rebolledo MD   75 mg at 07/09/24 0834    doxycycline monohydrate (MONODOX) capsule 100 mg  100 mg Oral Q12H [facility] (08/20) Mayelin Irizarry MD   100 mg at 07/09/24 0834    famotidine (PEPCID) injection 20 mg  20 mg Intravenous Q12H Mayelin Irizarry MD   20 mg at 07/09/24 0118    fluticasone (FLONASE) 50 MCG/ACT spray 1 spray  1 spray Both Nostrils Daily Mayelin Irizarry MD   1 spray at 07/09/24 0835    sodium chloride (PF) 0.9% PF flush 3 mL  3 mL Intracatheter Q8H Mayelin Irizarry MD   3 mL at 07/09/24 0835    sodium chloride (PF)  0.9% PF flush 3 mL  3 mL Intracatheter Q8H Mayelin Irizarry MD   3 mL at 07/09/24 0835        PHYSICAL EXAM     Vital signs  Temp:  [97.8  F (36.6  C)-97.9  F (36.6  C)] 97.9  F (36.6  C)  Pulse:  [62-77] 65  Resp:  [15-29] 15  BP: (125-188)/() 127/97  SpO2:  [96 %-100 %] 99 %    Weight:   [unfilled]    General Physical Exam: Patient is alert and oriented x 3. Vital signs were reviewed and are documented in EMR.   Neurological Exam:  Mental status: Attentive, interactive.  Speech: Fluent.  Cranial nerves: Perhaps slight left facial droop, otherwise no abnormalities noted  Motor: Strength appears to be full throughout.  Sensory: Intact to light touch throughout.  Coordination: Normal finger tapping, no dysmetria.  Gait: Deferred for safety.     DIAGNOSTIC STUDIES     Pertinent Radiology   Radiology Results: Personally reviewed image/s    CT/CTA  IMPRESSION:   HEAD CT:  1.  No intracranial hemorrhage, mass lesions, hydrocephalus or CT evidence for an acute infarct. MRI is more sensitive for the evaluation of acute infarcts.  2.  Mild diffuse cerebral parenchymal volume loss. Presumed chronic hypertensive/microvascular ischemic white matter changes.  3.  Small focus of encephalomalacia involving the lateral right thalamus.     HEAD CTA:  1.  High-grade narrowing of the distal extracranial internal carotid artery, worsened since prior head CTA 08/24/2013 and MRA 08/26/2017.  2.  Redemonstration of moderate narrowing of the M1 segment of the right middle cerebral artery, similar to the prior exam.  3.  Multifocal moderate narrowing of the A2 and A3 segments of the anterior cerebral arteries. Mild to moderate narrowing of the supraclinoid left internal carotid artery.     NECK CTA:  1.  High-grade critical narrowing of the right internal carotid artery starting at the origin to the skull base, worsened since previous MRA 08/26/2017.  2.  No hemodynamically significant narrowing of the left internal carotid artery  based on the NASCET criteria.  3.  The vertebral arteries are patent throughout their course in the neck.    MRI  IMPRESSION:  1.  Right caudate body, right internal capsule posterior limb, right hippocampus, right temporal stem, right temporal lobe white matter demonstrate multiple small acute infarcts.      2.  Chronic intracranial changes described above.     3.  Left maxillary sinus small air-fluid level. Please correlate for acute sinusitis.    Carotid US:  IMPRESSION:  1.  Right carotid: Very low velocity waveforms throughout the ICA consistent with patient's known high-grade/critical ICA stenosis as seen on CTA earlier today.  2.  Left carotid: Mild plaque formation, velocities consistent with less than 50% stenosis in the left internal carotid artery.  3.  Flow within the vertebral arteries is antegrade.    TTE  IMPRESSION:  1.  Right carotid: Very low velocity waveforms throughout the ICA consistent with patient's known high-grade/critical ICA stenosis as seen on CTA earlier today.  2.  Left carotid: Mild plaque formation, velocities consistent with less than 50% stenosis in the left internal carotid artery.  3.  Flow within the vertebral arteries is antegrade.    Pertinent Labs   Lab Results: personally reviewed.   Recent Results (from the past 24 hour(s))   Symptomatic Influenza A/B, RSV, & SARS-CoV2 PCR (COVID-19) Nasopharyngeal    Collection Time: 07/08/24  3:36 PM    Specimen: Nasopharyngeal; Swab   Result Value Ref Range    Influenza A PCR Negative Negative    Influenza B PCR Negative Negative    RSV PCR Negative Negative    SARS CoV2 PCR Negative Negative   Basic metabolic panel    Collection Time: 07/08/24  5:33 PM   Result Value Ref Range    Sodium 142 135 - 145 mmol/L    Potassium 4.0 3.4 - 5.3 mmol/L    Chloride 100 98 - 107 mmol/L    Carbon Dioxide (CO2) 30 (H) 22 - 29 mmol/L    Anion Gap 12 7 - 15 mmol/L    Urea Nitrogen 19.2 6.0 - 20.0 mg/dL    Creatinine 1.11 0.67 - 1.17 mg/dL    GFR Estimate  86 >60 mL/min/1.73m2    Calcium 10.2 (H) 8.6 - 10.0 mg/dL    Glucose 151 (H) 70 - 99 mg/dL   CBC with platelets and differential    Collection Time: 07/08/24  5:33 PM   Result Value Ref Range    WBC Count 9.7 4.0 - 11.0 10e3/uL    RBC Count 4.69 4.40 - 5.90 10e6/uL    Hemoglobin 14.0 13.3 - 17.7 g/dL    Hematocrit 40.1 40.0 - 53.0 %    MCV 86 78 - 100 fL    MCH 29.9 26.5 - 33.0 pg    MCHC 34.9 31.5 - 36.5 g/dL    RDW 11.0 10.0 - 15.0 %    Platelet Count 330 150 - 450 10e3/uL    % Neutrophils 53 %    % Lymphocytes 35 %    % Monocytes 6 %    % Eosinophils 4 %    % Basophils 1 %    % Immature Granulocytes 0 %    NRBCs per 100 WBC 0 <1 /100    Absolute Neutrophils 5.1 1.6 - 8.3 10e3/uL    Absolute Lymphocytes 3.4 0.8 - 5.3 10e3/uL    Absolute Monocytes 0.6 0.0 - 1.3 10e3/uL    Absolute Eosinophils 0.4 0.0 - 0.7 10e3/uL    Absolute Basophils 0.1 0.0 - 0.2 10e3/uL    Absolute Immature Granulocytes 0.0 <=0.4 10e3/uL    Absolute NRBCs 0.0 10e3/uL   Troponin T, High Sensitivity (now)    Collection Time: 07/08/24  5:33 PM   Result Value Ref Range    Troponin T, High Sensitivity <6 <=22 ng/L   Hemoglobin A1c    Collection Time: 07/08/24  5:33 PM   Result Value Ref Range    Hemoglobin A1C 8.1 (H) <5.7 %   Lipid panel reflex to direct LDL: Non-fasting    Collection Time: 07/08/24  5:33 PM   Result Value Ref Range    Cholesterol 226 (H) <200 mg/dL    Triglycerides 407 (H) <150 mg/dL    Direct Measure HDL 34 (L) >=40 mg/dL    LDL Cholesterol Calculated      Non HDL Cholesterol 192 (H) <130 mg/dL   TSH    Collection Time: 07/08/24  5:33 PM   Result Value Ref Range    TSH 2.62 0.30 - 4.20 uIU/mL   T4 free    Collection Time: 07/08/24  5:33 PM   Result Value Ref Range    Free T4 1.01 0.90 - 1.70 ng/dL   Comprehensive metabolic panel    Collection Time: 07/09/24  7:06 AM   Result Value Ref Range    Sodium 140 135 - 145 mmol/L    Potassium 4.1 3.4 - 5.3 mmol/L    Carbon Dioxide (CO2) 31 (H) 22 - 29 mmol/L    Anion Gap 9 7 - 15 mmol/L     Urea Nitrogen 14.7 6.0 - 20.0 mg/dL    Creatinine 0.99 0.67 - 1.17 mg/dL    GFR Estimate >90 >60 mL/min/1.73m2    Calcium 9.6 8.6 - 10.0 mg/dL    Chloride 100 98 - 107 mmol/L    Glucose 144 (H) 70 - 99 mg/dL    Alkaline Phosphatase 62 40 - 150 U/L    AST 15 0 - 45 U/L    ALT 24 0 - 70 U/L    Protein Total 7.6 6.4 - 8.3 g/dL    Albumin 4.3 3.5 - 5.2 g/dL    Bilirubin Total 0.6 <=1.2 mg/dL   Troponin T, High Sensitivity    Collection Time: 07/09/24  7:06 AM   Result Value Ref Range    Troponin T, High Sensitivity 6 <=22 ng/L   CBC with platelets and differential    Collection Time: 07/09/24  7:06 AM   Result Value Ref Range    WBC Count 8.8 4.0 - 11.0 10e3/uL    RBC Count 4.68 4.40 - 5.90 10e6/uL    Hemoglobin 14.0 13.3 - 17.7 g/dL    Hematocrit 40.5 40.0 - 53.0 %    MCV 87 78 - 100 fL    MCH 29.9 26.5 - 33.0 pg    MCHC 34.6 31.5 - 36.5 g/dL    RDW 11.0 10.0 - 15.0 %    Platelet Count 298 150 - 450 10e3/uL    % Neutrophils 58 %    % Lymphocytes 30 %    % Monocytes 7 %    % Eosinophils 5 %    % Basophils 1 %    % Immature Granulocytes 0 %    NRBCs per 100 WBC 0 <1 /100    Absolute Neutrophils 5.1 1.6 - 8.3 10e3/uL    Absolute Lymphocytes 2.6 0.8 - 5.3 10e3/uL    Absolute Monocytes 0.6 0.0 - 1.3 10e3/uL    Absolute Eosinophils 0.4 0.0 - 0.7 10e3/uL    Absolute Basophils 0.1 0.0 - 0.2 10e3/uL    Absolute Immature Granulocytes 0.0 <=0.4 10e3/uL    Absolute NRBCs 0.0 10e3/uL   Glucose by meter    Collection Time: 07/09/24  8:31 AM   Result Value Ref Range    GLUCOSE BY METER POCT 121 (H) 70 - 99 mg/dL       Total time spent for face to face visit, reviewing labs/imaging studies, counseling and coordination of care was: 1 Hour 15 Minutes. More than 50% of this time was spent on counseling and coordination of care.    Dragon software used in the dictation of this note.    Alise Redman MD  [facility] - [address], [address]  [place] [address] [phone]

## 2024-07-09 NOTE — ED PROVIDER NOTES
10:30 PM.  Medical patient signed out to me by Dr. Doherty.  I was asked to follow-up on brain MRI and discharge home with if no acute findings.  12:10 AM.  MRI showing left maxillary sinus small air-fluid level.  Chronic intracranial changes.  However there acute multiple small infarcts in various locations as identified on the MRI.  Results communicated to the patient.  Will discuss this with neurology and probable hospitalist admission.  12:29 AM.  Hospitalist called back and is in agreement.  Patient will be admitted to neuro telemetry inpatient.  12:36 AM.  I spoke with the stroke neurology at the [facility].  They recommend as loading him with 300 mg of Plavix and then continuing daily Plavix and aspirin.  They also recommended vascular surgery consult regarding his right internal carotid artery narrowing and lesions and also recommend an embolic workup.    Diagnostic impression:    Acute multiple infarcts.     Isra Clark MD  07/09/24 0011       Isra Clark MD  07/09/24 0029       Isra Clark MD  07/09/24 0037

## 2024-07-09 NOTE — ED NOTES
Bed: JNED-30  Expected date: 7/9/24  Expected time:   Means of arrival:   Comments:  Room 25 At 0645

## 2024-07-09 NOTE — PROGRESS NOTES
07/09/24 0930   Appointment Info   Signing Clinician's Name / Credentials (PT) Barbara Borrego, PT      Language Pt did know some English and PT did know some Hmong   Living Environment   People in Home alone   Current Living Arrangements apartment  (4 th floor and no steps.)   Home Accessibility no concerns   Living Environment Comments 4 floor apt.   Self-Care   Current Activity Tolerance good   Equipment Currently Used at Home none  (has not equipment at home.)   Activity/Exercise/Self-Care Comment Indep with ADLs and home ADLs and all mobility.   Pt does drive and shops.  He said he could have a friend help.   General Information   Onset of Illness/Injury or Date of Surgery 07/08/24   Referring Physician Mayelin Irizarry MD   Patient/Family Therapy Goals Statement (PT) Pt wants to go home.   Pertinent History of Current Problem (include personal factors and/or comorbidities that impact the POC) Per the chart, Sanchez Muñoz is a 39 year old male with a medical history of CVA, anxiety disorder, hypertension, hyperlipidemia, chronic back pain with radiculopathy, type 2 DM, vitamin D deficiency and other medical co morbidities who is being admitted for further evaluation of acute embolic CVA.   Existing Precautions/Restrictions   (call light left by the pt and nursing notified.)   Weight-Bearing Status - LLE weight-bearing as tolerated   Weight-Bearing Status - RLE weight-bearing as tolerated   Cognition   Orientation Status (Cognition) oriented x 4   Follows Commands (Cognition) WNL   Cognitive Status Comments followed all commands  (Pt did not have his glasses here.)   Pain Assessment   Patient Currently in Pain   (no HA, but had dizziness with mobility)   Posture    Posture Comments good posture   Range of Motion (ROM)   Range of Motion ROM is WNL   ROM Comment bilat LEs   Strength (Manual Muscle Testing)   Strength (Manual Muscle Testing) strength is WNL   Strength Comments bilat LEs.   Bed Mobility   Comment,  (Bed Mobility) supine>sit indep   Transfers   Comment, (Transfers) Sit<>stand indep but did c/o dizziness.  No increased dizziness with eye tracking bilat.   Gait/Stairs (Locomotion)   [place] Level (Gait) supervision;verbal cues;1 person assist   Assistive Device (Gait) other (see comments)  (No AD)   Distance in Feet (Gait) 20'   Pattern (Gait) step-through;swing-through   Deviations/Abnormal Patterns (Gait) gait speed decreased   Comment, (Gait/Stairs) Pt does catch the L LE with swing aphase on occ.  Incrased trunk sway to the left.   Balance   Balance Comments Supervision and no LOB.  Pt did c/o increased dizziness with mobility and did not have increased dizziness with eye tracking and turning to the Left to get a paper towel and turning R to put it in the trash.  No LOB.   Sensory Examination   Sensory Perception Comments Pt could feel light touch bilat LEs   Coordination   Coordination Comments WFL bilat LEs   Clinical Impression   Criteria for Skilled Therapeutic Intervention Yes, treatment indicated   PT Diagnosis (PT) Impaired functional mobility   Influenced by the following impairments dec bal, dizziness.   Functional limitations due to impairments gait   Clinical Presentation (PT Evaluation Complexity) evolving   Clinical Presentation Rationale Pt presents medically diagnosed.   Clinical Decision Making (Complexity) low complexity   Planned Therapy Interventions (PT) balance training;gait training;home exercise program;strengthening   Risk & Benefits of therapy have been explained evaluation/treatment results reviewed;care plan/treatment goals reviewed;risks/benefits reviewed;patient;participants voiced agreement with care plan   PT Total Evaluation Time   PT Julianaal, Low Complexity Minutes (83625) 13   Physical Therapy Goals   PT Frequency Daily   PT Predicted Duration/Target Date for Goal Attainment 07/16/24   PT Goals Gait;PT Goal 1   PT: Gait Modified independent;Greater than 200 feet   PT: Goal  1 Pt to jono bilat LE ex x 20 reps to increase strength for mobility and be indep with HEP   Interventions   Interventions Quick Adds Gait Training;Therapeutic Procedure;Therapeutic Activity;Neuromuscular Re-ed   Therapeutic Activity   Treatment Detail/Skilled Intervention Toilet transfers with OT.   Gait Training   Gait Training Minutes (64230) 10   Symptoms Noted During/After Treatment (Gait Training) dizziness  (He did have dizziness with mobility but bending and eye tracking and turning his body left and to the right during functional mobility did not sig increase the  dizziness)   Treatment Detail/Skilled Intervention Pt walked slowly and he need cues to  the L foot more with swing ahase.  Slight trunk sway to the left.  No LOB with mobility.  Pt was able to bilat during functional mobility with supervision and had no LOB.   Distance in Feet 175'   Physical Assistance Level (Gait Training) supervision;verbal cues;1 person assist   Weight Bearing (Gait Training) weight-bearing as tolerated   Assistive Device (Gait Training) other (see comments)  (none)   Pattern Analysis (Gait Training) swing-through gait   Gait Analysis Deviations decreased toe-to-floor clearance  (left side on occ.)   Impairments (Gait Analysis/Training) balance impaired   PT Discharge Planning   PT Plan gait without AD, bal, LE ex. instruct in HEP.   PT Discharge Recommendation (DC Rec) home with assist   PT Rationale for DC Rec The pt should be able to dc to home.  Pt did not have any LOB with mobility but does c/o dizziness.   PT Brief overview of current status PT eval. indep with bed mobility and transfers, Supervision with gait without AD.  Pt did have dizziness with mobility. No LOB.   Total Session Time   Timed Code Treatment Minutes 10   Total Session Time (sum of timed and untimed services) 23

## 2024-07-09 NOTE — MEDICATION SCRIBE - ADMISSION MEDICATION HISTORY
Medication Scribe Admission Medication History    Admission medication history is complete. The information provided in this note is only as accurate as the sources available at the time of the update.    Information Source(s): Patient via in-person    Pertinent Information: Patient states that he is taking Metformin, Lisinopril and Atorvastatin when ever he remember to take it. Per patient, the last administration for Metformin, Atorvastatin and Lisinopril couple days ago.  Patient states that he takes Aspirin, Ergocalciferol, Trulicity, Insulin and Sertraline until he ran out of them over month ago.  All medication on PTA med list does not show recent fill history. Per sure script the last known fill history for any of medications on PTA med list was November of 2023.    Changes made to PTA medication list:  Added: None  Deleted: Ammonium lactate, Selenium (per patient)  Changed: None    Allergies reviewed with patient and updates made in EHR: yes    Medication History Completed By: Kay Ortiz 7/9/2024 12:41 AM    PTA Med List   Medication Sig Last Dose    aspirin 81 MG EC tablet [ASPIRIN 81 MG EC TABLET] Take 1 tablet (81 mg total) by mouth daily. To prevent stroke Unknown at unknown    atorvastatin (LIPITOR) 80 MG tablet Take 80 mg by mouth daily Past Week at unknown    dulaglutide (TRULICITY) 1.5 mg/0.5 mL PnIj [DULAGLUTIDE (TRULICITY) 1.5 MG/0.5 ML PNIJ] Inject 1.5 mg under the skin every 7 days. For blood sugars More than a month at ran out    ergocalciferol (VITAMIN D2) 1,250 mcg (50,000 unit) capsule [ERGOCALCIFEROL (VITAMIN D2) 1,250 MCG (50,000 UNIT) CAPSULE] Take 1 capsule (50,000 Units total) by mouth once a week. For low Vitamin D More than a month at ran out    insulin glargine (BASAGLAR KWIKPEN) 100 unit/mL (3 mL) pen [INSULIN GLARGINE (BASAGLAR KWIKPEN) 100 UNIT/ML (3 ML) PEN] Inject 37 Units under the skin daily. Please write directions in Hmong More than a month at ran out    lisinopril  (ZESTRIL) 20 MG tablet Take 20 mg by mouth daily Past Week at unknown    meclizine (ANTIVERT) 25 MG tablet Take 1 tablet (25 mg) by mouth 3 times daily as needed for dizziness     metFORMIN (GLUCOPHAGE) 1000 MG tablet [METFORMIN (GLUCOPHAGE) 1000 MG TABLET] Take 1 tablet (1,000 mg total) by mouth 2 (two) times a day with meals. Past Week at unknown    sertraline (ZOLOFT) 50 MG tablet [SERTRALINE (ZOLOFT) 50 MG TABLET] TAKE 1 TABLET DAILY FOR DEPRESSION // 1 HNUB NOJ 1 LUB CHAIM HELMS More than a month at ran out

## 2024-07-09 NOTE — PROGRESS NOTES
"Occupational Therapy     07/09/24 0933   Appointment Info   Signing Clinician's Name / Credentials (OT) Estela Freeman OTR/L   Rehab Comments (OT) @ end of session, pt seated EOB w/call light in reach, eating breakfast (notiifed pt's nurse)   Living Environment   People in Home alone   Current Living Arrangements apartment   Home Accessibility no concerns  (has elevator)   Living Environment Comments tub/shower combo w/GBs   Self-Care   Usual Activity Tolerance good   Current Activity Tolerance moderate   Equipment Currently Used at Home none   Activity/Exercise/Self-Care Comment Pt reports @ baseline, he is independent w/ADLs/IADLs; drives   General Information   Onset of Illness/Injury or Date of Surgery 07/08/24   Referring Physician Mayelin Irizarry MD   Patient/Family Therapy Goal Statement (OT) none stated   General Observations and Info Per chart:  \"39 year old male with a medical history of CVA, anxiety disorder, hypertension, hyperlipidemia, chronic back pain with radiculopathy, type 2 DM, vitamin D deficiency and other medical co morbidities who is being admitted for further evaluation of acute embolic CVA.\"   Cognitive Status Examination   Orientation Status orientation to person, place and time   Affect/Mental Status (Cognitive) WNL   Follows Commands WNL   Visual Perception   Visual Impairment/Limitations blurry vision;corrective lenses for distance   Impact of Vision Impairment on Function (Vision) difficult to assess if blurred vision d/t not wearing his glasses; able to fixate & track in all quadrants   Sensory   Sensory Quick Adds UE sensation intact   Pain Assessment   Patient Currently in Pain No   Posture   Posture not impaired   Range of Motion Comprehensive   General Range of Motion no range of motion deficits identified   Strength Comprehensive (MMT)   General Manual Muscle Testing (MMT) Assessment no strength deficits identified   Coordination   Upper Extremity Coordination No deficits were " identified  (R hand dominant)   Coordination Comments slightly off target w/finger-nose on L; old stroke affecting L UE/LE. Pt able to slow down & improve targeting/functional.   Bed Mobility   Comment (Bed Mobility) mod I   Transfers   Transfer Comments SBA/mod I bed & toilet   Balance   Balance Comments SBA amb in room w/o AD-slight limp on L w/o overt unsteadiness/no LOB.   Activities of Daily Living   BADL Assessment/Intervention lower body dressing   Lower Body Dressing Assessment/Training   Comment, (Lower Body Dressing) SBA-completed donning socks w/flexing to his feet w/o apparent change in symptoms   Clinical Impression   Criteria for Skilled Therapeutic Interventions Met (OT) Yes, treatment indicated   OT Diagnosis decreased activity tolerance   OT Problem List-Impairments impacting ADL activity tolerance impaired   Assessment of Occupational Performance 1-3 Performance Deficits   Identified Performance Deficits dizziness, decreased activity tolerance   Planned Therapy Interventions (OT) progressive activity/exercise;home program guidelines   Clinical Decision Making Complexity (OT) problem focused assessment/low complexity   Risk & Benefits of therapy have been explained evaluation/treatment results reviewed;care plan/treatment goals reviewed;risks/benefits reviewed;participants voiced agreement with care plan;participants included;patient   Clinical Impression Comments Pt presents close to his baseline w/reported dizziness w/movement impacting activity tolerance for ADLs/IADLs.   OT Total Evaluation Time   OT Eval, Low Complexity Minutes (46626) 15   OT Goals   Therapy Frequency (OT) Daily   OT Predicted Duration/Target Date for Goal Attainment 07/12/24   OT Goals Aerobic Activity;Home Management   OT: Home Management Supervision/stand-by assist;with light demand household tasks  (demo safety w/reach & bend tasks in prep IADLs)   OT: Perform aerobic activity with stable cardiovascular response  intermittent activity;15 minutes   Interventions   Interventions Quick Adds Therapeutic Activity   Therapeutic Activities   Therapeutic Activity Minutes (11811) 9   Symptoms noted during/after treatment dizziness   Treatment Detail/Skilled Intervention Pt reports dizziness w/all mvmt-doesn't appear to worsen during specific tasks/positions but remains constant; denies other symptoms. Additional functional mobility for balance & activity tolerance ~5 minutes in hallway w/SBA & no LOB. SBA picking object off the floor w/o apparent difficulty. Initiated educ w/safety once home as pt lives alone/independent. Vitals w/activity:  HR 79, 100% on RA, /92.   OT Discharge Planning   OT Plan reach/bend tasks-safety & monitor dizziness-EC/WS; safety w/IADLs.   OT Discharge Recommendation (DC Rec) home with assist  (could benefit from initial assist for heavier IADLs until back to baseline. Defer return to driving to physician/Neurologist.)   OT Rationale for DC Rec Pt appears close to his baseline but w/reports of dizziness affecting activity tolerance during ADLs. Rec DC home-may benefit from A @ least initially w/heavier IADLs/driving/errands-pt stated he has friends that can assist.   OT Brief overview of current status SBA basic ADLs; SBA/mod I trfs & functional mob w/o AD   Total Session Time   Timed Code Treatment Minutes 9   Total Session Time (sum of timed and untimed services) 24

## 2024-07-09 NOTE — CONSULTS
Care Management Initial Consult    General Information  Assessment completed with: Patient, pt  Type of CM/SW Visit: Initial Assessment    Primary Care Provider verified and updated as needed: Yes   Readmission within the last 30 days: no previous admission in last 30 days      Reason for Consult: discharge planning  Advance Care Planning: Advance Care Planning Reviewed: no concerns identified, verified with patient     General Information Comments: lives alone and no svcs, independent at baseline and needs to have glucometer and BP cuff at home    Communication Assessment  Patient's communication style: spoken language (English or Bilingual), spoken language (non-English) (Hmong)             Cognitive  Cognitive/Neuro/Behavioral:                        Living Environment:   People in home: alone     Current living Arrangements: apartment      Able to return to prior arrangements: yes       Family/Social Support:  Care provided by: self  Provides care for: no one  Marital Status: Single  Other (specify)          Description of Support System: Involved, Supportive    Support Assessment: Adequate family and caregiver support, Adequate social supports    Current Resources:   Patient receiving home care services: No     Community Resources: None  Equipment currently used at home: none  Supplies currently used at home: None, Other (could benefit at home w/glucometer)    Employment/Financial:  Employment Status:          Financial Concerns: none   Referral to Financial Worker: No       Does the patient's insurance plan have a 3 day qualifying hospital stay waiver?  No    Lifestyle & Psychosocial Needs:  Social Determinants of Health     Food Insecurity: Not on file   Depression: Not at risk (8/24/2022)    [facility]    PHQ-2     PHQ-2 TOTAL SCORE: 0   Housing Stability: Not on file   Tobacco Use: Low Risk  (7/8/2024)    Patient  History     Smoking Tobacco Use: Never     Smokeless Tobacco Use: Never     Passive Exposure: Not on file   Financial Resource Strain: Not on file   Alcohol Use: Not on file   Transportation Needs: Not on file   Physical Activity: Not on file   Interpersonal Safety: Not on file   Stress: Not on file   Social Connections: Unknown (3/30/2022)    [facility]    Social Connections     Frequency of Communication with Friends and Family: Not on file   Health Literacy: Not on file       Functional Status:  Prior to admission patient needed assistance:   Dependent ADLs:: Independent  Dependent IADLs:: Independent  Assesssment of Functional Status: Needs assistive devices (DME) (needs glucometer)    Mental Health Status:  Mental Health Status: No Current Concerns       Chemical Dependency Status:                Values/Beliefs:  Spiritual, Cultural Beliefs, [employer] Practices, Values that affect care:                 Additional Information:  Assessed, lives alone and independent at baseline and no svcs. Needs a glucometer, states is insurance didn't cover it. CM to follow for discharge needs. Friend to transport.      Leila Crockett RN

## 2024-07-09 NOTE — H&P
[facility]    History and Physical - Hospitalist Service       Date of Admission:  7/8/2024    Assessment & Plan    Sanchez Muñoz is a 39 year old male with a medical history of CVA, anxiety disorder, hypertension, hyperlipidemia, chronic back pain with radiculopathy, type 2 DM, vitamin D deficiency and other medical co morbidities who is being admitted for further evaluation of acute embolic CVA.     Patient Active Problem List   Diagnosis    Mixed hyperlipidemia    Severe episode of recurrent major depressive disorder, without psychotic features (H)    Blurry Vision    Essential hypertension with goal blood pressure less than 140/90    Fatigue    Hemiparesis Of Left Side    History of ischemic stroke without residual deficits    Type 2 diabetes mellitus with hyperglycemia, with long-term current use of insulin (H)    Vitamin D Deficiency    Insomnia    Lumbar radiculopathy    Low back pain    Neck pain    Tinea versicolor    Stroke due to intracerebral hemorrhage, secondary to carotid artery dissection    Failure to attend appointment    Vitamin D deficiency disease    Hypertriglyceridemia    Stenosis of right carotid artery    History of right carotid artery dissection    Non compliance with medical treatment    Hemiplegia of left dominant side as late effect of cerebral infarction, unspecified hemiplegia type (H)    Dizziness    Ac isch multi vasc territories stroke (H)      Acute CVA-patient has multiple risk factors for CVA hypertension, hyperlipidemia, type II DM, history of right coronary artery dissection, admitted prior documentation of intracerebral hemorrhage.  Neurology consulted patient has been on aspirin.  Recommendations are to load with aspirine 300g X1 and continue with 75 mg daily with ASA. Admit for risk stratification and management.  Echocardiogram bubble study ordered to evaluate for episode.  If non revealing, will need a KRISSY. Consider cardiology input. Check Check A1c,  "lipid panel, TSH and free t4.  Active neurological symptoms when seen and included dizziness and blurry vision      Hypertension-permissive hypertension for now.  Hold blood pressure medication    Hyperlipidemia-will benefit from statin    Type 2DM Accu-Cheks, sliding scale insulin, blood sugar goal 100-1 40-    Chronic back pain-pain control as needed    History of intracerebral hemorrhage/carotid dissection-remote history of CVA     Acute sinusitis-Flonase, Doxy and Flonase as needed    Medication noncompliance-rest of patient's medical problems management remains unchanged      Diet:  NPO until passes a swallow eval, then diabetic diet  DVT Prophylaxis: Pneumatic Compression Devices  Hurley Catheter: Not present  Lines: None     Cardiac Monitoring: None  Code Status:  Full code    Clinically Significant Risk Factors Present on Admission           # Hypercalcemia: Highest Ca = 10.2 mg/dL in last 2 days, will monitor as appropriate      # Drug Induced Platelet Defect: home medication list includes an antiplatelet medication   # Hypertension: Noted on problem list             # Overweight: Estimated body mass index is 28.35 kg/m  as calculated from the following:    Height as of this encounter: 1.575 m (5' 2\").    Weight as of this encounter: 70.3 kg (155 lb).              Disposition Plan     Medically Ready for Discharge: Anticipated in 2-4 Days           Mayelin Irizarry MD  Hospitalist Service  [facility]  Securely message with [employer] (more info)  Text page via [employer] Paging/Directory     ______________________________________________________________________    Chief Complaint   Dizziness      History of Present Illness   Sanchez Muñoz is a 39 year old male with a medical history of CVA, anxiety disorder, hypertension, hyperlipidemia, chronic back pain with radiculopathy, type 2 DM, vitamin D deficiency and other medical co morbidities who is being admitted for further evaluation of acute " embolic CVA.     Patient was seen in the ER on admission. Per history, he was at at baseline until about 1.5 weeks ago he developed ongoing dizziness and light headedness.  Per history, member reports the patient has been having dizziness and light-headedness for 1.5 weeks. Per family, the patient has had a loss of appetite and has been taking tylenol and some tea for his symptoms, but it has not helped. Patient  also reports dizziness, vision changes and weakness.     Initial work up done showed normal BMP, CBC was unremarkable, covid test negative, Influenza A negative, influenza B negative, RSV negative.    CTA head and neck  done showed results below:    IMPRESSION:   HEAD CT:  1.  No intracranial hemorrhage, mass lesions, hydrocephalus or CT evidence for an acute infarct. MRI is more sensitive for the evaluation of acute infarcts.  2.  Mild diffuse cerebral parenchymal volume loss. Presumed chronic hypertensive/microvascular ischemic white matter changes.  3.  Small focus of encephalomalacia involving the lateral right thalamus.     HEAD CTA:  1.  High-grade narrowing of the distal extracranial internal carotid artery, worsened since prior head CTA 08/24/2013 and MRA 08/26/2017.  2.  Redemonstration of moderate narrowing of the M1 segment of the right middle cerebral artery, similar to the prior exam.  3.  Multifocal moderate narrowing of the A2 and A3 segments of the anterior cerebral arteries. Mild to moderate narrowing of the supraclinoid left internal carotid artery.     NECK CTA:  1.  High-grade critical narrowing of the right internal carotid artery starting at the origin to the skull base, worsened since previous MRA 08/26/2017.  2.  No hemodynamically significant narrowing of the left internal carotid artery based on the NASCET criteria.  3.  The vertebral arteries are patent throughout their course in the neck.     Mri brain showed results below:    Narrative & Impression   EXAM: MR BRAIN W/O and W  CONTRAST  LOCATION: [facility]  DATE: 7/8/2024     INDICATION: Vertigo, intracranial atherosclerosis, poorly managed hypertension  COMPARISON: CT head 7/8/2024  CONTRAST: 7ml Gadavist  TECHNIQUE: Routine multiplanar multisequence head MRI without and with intravenous contrast.     FINDINGS:  INTRACRANIAL CONTENTS: Right caudate body, right internal capsule posterior limb, right hippocampus, right temporal stem, right temporal lobe white matter demonstrate multiple small acute infarcts. (Restricted diffusion, positive FLAIR). No definite   microhemorrhage on SWI.      No mass, acute hemorrhage, or extra-axial fluid collections.      Scattered nonspecific small foci of T2/FLAIR hyperintense signal in the cerebral white matter. Expanded differential includes chronic microvascular disease, demyelination, inflammatory disorders, Lyme disease, migraines, nonspecific gliosis, among other   etiologies.       Normal ventricles and sulci. Normal position of the cerebellar tonsils. No pathologic contrast enhancement.      Right medial globus pallidus and adjacent right posterior limb internal capsule near genu demonstrates a prominent chronic microhemorrhage on SWI measuring 7 mm. This appears just anterior to the right posterior limb internal capsule acute infarct.   Findings may be secondary to cavernous malformation, hypertensive microangiopathy, chronic hemorrhagic lacunar infarct.     Right brainstem wallerian degeneration.     SELLA: No abnormality accounting for technique.     OSSEOUS STRUCTURES/SOFT TISSUES: Normal marrow signal. Right distal ICA flow void is absent. Please defer to CTA had and neck 7/8/2024.      ORBITS: No abnormality accounting for technique.      SINUSES/MASTOIDS: Mild mucosal thickening scattered about the paranasal sinuses. Left maxillary sinus small air-fluid level. Please correlate for acute sinusitis. No middle ear or mastoid effusion.                                                                        IMPRESSION:  1.  Right caudate body, right internal capsule posterior limb, right hippocampus, right temporal stem, right temporal lobe white matter demonstrate multiple small acute infarcts.      2.  Chronic intracranial changes described above.     3.  Left maxillary sinus small air-fluid level. Please correlate for acute sinusitis.     Patient's main complaints when I saw her was dizziness and blurry vision.  She denied headache, nausea, vomiting fevers chills he denies chest pain    Past Medical History    Past Medical History:   Diagnosis Date    Anxiety     Cerebrovascular accident (CVA) involving anterior circulation of right side (H)     hemorrhagic due to right ica dissection per clinic notes    Hypertension     Hypertriglyceridemia     Lumbar radiculopathy     Tinea versicolor     Uncontrolled diabetes mellitus     on insulin    Vitamin D deficiency        Past Surgical History   Past Surgical History:   Procedure Laterality Date    IR MISCELLANEOUS PROCEDURE  8/24/2013    IR MISCELLANEOUS PROCEDURE  8/24/2013    IR MISCELLANEOUS PROCEDURE  8/24/2013    IR MISCELLANEOUS PROCEDURE  8/26/2013    IR MISCELLANEOUS PROCEDURE  8/26/2013    IR MISCELLANEOUS PROCEDURE  9/3/2013       Prior to Admission Medications   Prior to Admission Medications   Prescriptions Last Dose Informant Patient Reported? Taking?   ammonium lactate (LAC-HYDRIN) 12 % lotion   Yes No   Sig: [AMMONIUM LACTATE (LAC-HYDRIN) 12 % LOTION]    aspirin 81 MG EC tablet   No No   Sig: [ASPIRIN 81 MG EC TABLET] Take 1 tablet (81 mg total) by mouth daily. To prevent stroke   atorvastatin (LIPITOR) 80 MG tablet   No No   Sig: [ATORVASTATIN (LIPITOR) 80 MG TABLET] TAKE 1 TABLET BY MOUTH DAILY FOR CHOLESTEROL // IB HNUB NOJ 1 LUB PAB CHASE NTSHAV MUAJ ROJ   blood glucose test strips   No No   Sig: [BLOOD GLUCOSE TEST STRIPS] Dispense brand per patient's insurance at pharmacy discretion.   blood-glucose meter Misc   No  "No   Sig: [BLOOD-GLUCOSE METER MISC] Use 1 Device As Directed daily. Okay to substitute with any glucometer and supply covered by insurance.   dulaglutide (TRULICITY) 1.5 mg/0.5 mL PnIj   No No   Sig: [DULAGLUTIDE (TRULICITY) 1.5 MG/0.5 ML PNIJ] Inject 1.5 mg under the skin every 7 days. For blood sugars   ergocalciferol (VITAMIN D2) 1,250 mcg (50,000 unit) capsule   No No   Sig: [ERGOCALCIFEROL (VITAMIN D2) 1,250 MCG (50,000 UNIT) CAPSULE] Take 1 capsule (50,000 Units total) by mouth once a week. For low Vitamin D   generic lancets (FINGERSTIX LANCETS)   No No   Sig: [GENERIC LANCETS (FINGERSTIX LANCETS)] Use to check blood sugars twice daily. OneTouch Delica. Dispense brand per patient's insurance at pharmacy discretion.   generic lancets (MICROLET LANCET)   No No   Sig: [GENERIC LANCETS (MICROLET LANCET)] Dispense brand per patient's insurance at pharmacy discretion.   insulin glargine (BASAGLAR KWIKPEN) 100 unit/mL (3 mL) pen   No No   Sig: [INSULIN GLARGINE (BASAGLAR KWIKPEN) 100 UNIT/ML (3 ML) PEN] Inject 37 Units under the skin daily. Please write directions in Hmong   lisinopriL (PRINIVIL,ZESTRIL) 20 MG tablet   No No   Sig: [LISINOPRIL (PRINIVIL,ZESTRIL) 20 MG TABLET] TAKE 1 TABLET DAILY FOR BLOOD PRESSURE OR KIDNEY // 1 HNUB NOJ 1 LUB PAB CHASE NTSHAV SIAB LO LUB RAUM.   metFORMIN (GLUCOPHAGE) 1000 MG tablet   No No   Sig: [METFORMIN (GLUCOPHAGE) 1000 MG TABLET] Take 1 tablet (1,000 mg total) by mouth 2 (two) times a day with meals.   pen needle, diabetic (BD ULTRA-FINE NEHAL PEN NEEDLE) 32 gauge x 5/32\" Ndle   No No   Sig: [PEN NEEDLE, DIABETIC (BD ULTRA-FINE NEHAL PEN NEEDLE) 32 GAUGE X 5/32\" NDLE] Use one pen needle daily to inject Lantus (insulin)   selenium sulfide (SELSUN) 2.5 % lotion   No No   Sig: [SELENIUM SULFIDE (SELSUN) 2.5 % LOTION] Leave on trunk and arms 10 min, then wash off.  Repeat 3-5 times per wek.   sertraline (ZOLOFT) 50 MG tablet   No No   Sig: [SERTRALINE (ZOLOFT) 50 MG TABLET] TAKE 1 " TABLET DAILY FOR DEPRESSION // 1 HNUB NOJ 1 LUB CHAIM STONE NYUAB SIAB      Facility-Administered Medications: None        Review of Systems    The 10 point Review of Systems is negative other than noted in the HPI or here.     Social History   I have reviewed this patient's social history and updated it with pertinent information if needed.  Social History     Tobacco Use    Smoking status: Never    Smokeless tobacco: Never   Substance Use Topics    Alcohol use: No    Drug use: No         Family History   I have reviewed this patient's family history and updated it with pertinent information if needed.  Family History   Problem Relation Age of Onset    No Known Problems Mother     No Known Problems Father     No Known Problems Sister     No Known Problems Brother     No Known Problems Maternal Grandmother     No Known Problems Maternal Grandfather     No Known Problems Paternal Grandmother     No Known Problems Paternal Grandfather          Allergies   No Known Allergies     Physical Exam   Vital Signs: Temp: 97.8  F (36.6  C) Temp src: Oral BP: (!) 143/86 Pulse: 62   Resp: 27 SpO2: 97 % O2 Device: None (Room air)    Weight: 155 lbs 0 oz      General Aox3, appropriate affect, NAD, on RA  HEENT  MMM, EOMI, PERRL  Chest Adeq E b/l, No wheezing  Heart RRR, No M/R/G  Abd- Soft, NT, BS+  - Deferred,   Extremity- Moving all extremities, No digital clubbing,   No edema  Neuro-History of CVA with left hemiparesis   Aox3, CN II- XII intact, + visual changes   P/5, tone normal, reflexes 2+, plantar reflex downwards,  gait not checked  Skin  Has no tattoo, No skin rash     Medical Decision Making       85 MINUTES SPENT BY ME on the date of service doing chart review, history, exam, documentation & further activities per the note.      ------------------ MEDICAL DECISION MAKING ------------------------------------------------------------------------------------------------------  MANAGEMENT DISCUSSED with the following over the  past 24 hours: patient and care team       Data   ------------------------- PAST 24 HR DATA REVIEWED -----------------------------------------------    I have personally reviewed the following data over the past 24 hrs:    9.7  \   14.0   / 330     142 100 19.2 /  151 (H)   4.0 30 (H) 1.11 \     Trop: <6 BNP: N/A     TSH: N/A T4: N/A A1C: 8.1 (H)       Imaging results reviewed over the past 24 hrs:   Recent Results (from the past 24 hour(s))   CTA Head Neck with Contrast    Narrative    EXAM: CTA HEAD NECK W CONTRAST  LOCATION: [facility]  DATE: 7/8/2024    INDICATION: Headache, dizziness, HTN, history of prior stroke  COMPARISON: Brain MRI, brain and neck MRA 08/26/2017, head and neck CT angiogram 08/24/2013  CONTRAST: 67 mL Isovue 370  TECHNIQUE: Head and neck CT angiogram with IV contrast. Noncontrast head CT followed by axial helical CT images of the head and neck vessels obtained during the arterial phase of intravenous contrast administration. Axial 2D reconstructed images and   multiplanar 3D MIP reconstructed images of the head and neck vessels were performed by the technologist. Dose reduction techniques were used. All stenosis measurements made according to NASCET criteria unless otherwise specified.    FINDINGS:   NONCONTRAST HEAD CT:   INTRACRANIAL CONTENTS: No intracranial hemorrhage. Mild diffuse cerebral parenchymal volume loss. No midline shift. The basilar cisterns are patent. Small focus of encephalomalacia involving the lateral right thalamus. Mild periventricular and scattered   foci of deep white matter hypodensities involving both cerebral hemispheres. No CT evidence for an acute infarct.    VISUALIZED ORBITS/SINUSES/MASTOIDS: No intraorbital abnormality. Mild chronic mucosal thickening of the left maxillary sinus. No middle ear or mastoid effusion.    BONES/SOFT TISSUES: No acute abnormality.    HEAD CTA:  ANTERIOR CIRCULATION: High-grade narrowing of the distal  extracranial internal carotid artery, worsened since prior exam. Moderate narrowing of the M1 segment of the right middle cerebral artery, similar to the prior exam. Multifocal moderate narrowing   of the A2 and A3 segments of the anterior cerebral arteries. Mild to moderate narrowing of the supraclinoid left internal carotid artery. The left middle cerebral artery is patent without hemodynamically significant stenosis. Standard [place]   anatomy.    POSTERIOR CIRCULATION: No stenosis/occlusion, aneurysm, or high flow vascular malformation. Dominant left and smaller right vertebral artery contribute to a normal basilar artery.     DURAL VENOUS SINUSES: The major dural venous sinuses are not well opacified on this exam    NECK CTA:  RIGHT CAROTID: High-grade critical critical narrowing of the right internal carotid artery starting at the origin to the skull base, worsened since previous MRA 08/26/2017.    LEFT CAROTID: Mild atherosclerotic plaque of the left carotid bulb. No measurable stenosis or dissection.    VERTEBRAL ARTERIES: No focal stenosis or dissection. Dominant left and smaller right vertebral arteries.    AORTIC ARCH: Classic aortic arch anatomy with no significant stenosis at the origin of the great vessels.    NONVASCULAR STRUCTURES: The lung apices are clear.      Impression    IMPRESSION:   HEAD CT:  1.  No intracranial hemorrhage, mass lesions, hydrocephalus or CT evidence for an acute infarct. MRI is more sensitive for the evaluation of acute infarcts.  2.  Mild diffuse cerebral parenchymal volume loss. Presumed chronic hypertensive/microvascular ischemic white matter changes.  3.  Small focus of encephalomalacia involving the lateral right thalamus.    HEAD CTA:  1.  High-grade narrowing of the distal extracranial internal carotid artery, worsened since prior head CTA 08/24/2013 and MRA 08/26/2017.  2.  Redemonstration of moderate narrowing of the M1 segment of the right middle cerebral  artery, similar to the prior exam.  3.  Multifocal moderate narrowing of the A2 and A3 segments of the anterior cerebral arteries. Mild to moderate narrowing of the supraclinoid left internal carotid artery.    NECK CTA:  1.  High-grade critical narrowing of the right internal carotid artery starting at the origin to the skull base, worsened since previous MRA 08/26/2017.  2.  No hemodynamically significant narrowing of the left internal carotid artery based on the NASCET criteria.  3.  The vertebral arteries are patent throughout their course in the neck.     MR Brain w/o & w Contrast    Narrative    EXAM: MR BRAIN W/O and W CONTRAST  LOCATION: [facility]  DATE: 7/8/2024    INDICATION: Vertigo, intracranial atherosclerosis, poorly managed hypertension  COMPARISON: CT head 7/8/2024  CONTRAST: 7ml Gadavist  TECHNIQUE: Routine multiplanar multisequence head MRI without and with intravenous contrast.    FINDINGS:  INTRACRANIAL CONTENTS: Right caudate body, right internal capsule posterior limb, right hippocampus, right temporal stem, right temporal lobe white matter demonstrate multiple small acute infarcts. (Restricted diffusion, positive FLAIR). No definite   microhemorrhage on SWI.     No mass, acute hemorrhage, or extra-axial fluid collections.     Scattered nonspecific small foci of T2/FLAIR hyperintense signal in the cerebral white matter. Expanded differential includes chronic microvascular disease, demyelination, inflammatory disorders, Lyme disease, migraines, nonspecific gliosis, among other   etiologies.      Normal ventricles and sulci. Normal position of the cerebellar tonsils. No pathologic contrast enhancement.     Right medial globus pallidus and adjacent right posterior limb internal capsule near genu demonstrates a prominent chronic microhemorrhage on SWI measuring 7 mm. This appears just anterior to the right posterior limb internal capsule acute infarct.   Findings may be  secondary to cavernous malformation, hypertensive microangiopathy, chronic hemorrhagic lacunar infarct.    Right brainstem wallerian degeneration.    SELLA: No abnormality accounting for technique.    OSSEOUS STRUCTURES/SOFT TISSUES: Normal marrow signal. Right distal ICA flow void is absent. Please defer to CTA had and neck 7/8/2024.     ORBITS: No abnormality accounting for technique.     SINUSES/MASTOIDS: Mild mucosal thickening scattered about the paranasal sinuses. Left maxillary sinus small air-fluid level. Please correlate for acute sinusitis. No middle ear or mastoid effusion.       Impression    IMPRESSION:  1.  Right caudate body, right internal capsule posterior limb, right hippocampus, right temporal stem, right temporal lobe white matter demonstrate multiple small acute infarcts.     2.  Chronic intracranial changes described above.    3.  Left maxillary sinus small air-fluid level. Please correlate for acute sinusitis.

## 2024-07-09 NOTE — DISCHARGE INSTRUCTIONS
Your evaluation included brain imaging today.  You do have some narrowings in the blood vessels in the brain which would put you at significant risk for stroke at some point in the future.  We need to get you reconnected as soon as possible with your primary care doctor after this emergency department visit to start managing your blood pressure, blood sugar, and cholesterol as soon as possible.  Referral has been placed for this.  You can use meclizine as needed for vertigo symptoms which we think could be related to a balance problem in the inner ear.

## 2024-07-09 NOTE — PROGRESS NOTES
"[facility]    Stroke Telephone Note    I was called by Mayelin Irizarry on 07/09/24 regarding patient Sanchez Muñoz. The patient is a 39 year old male with history of hyperlipidemia, hypertension, type 2 diabetes, right carotid stenosis presents with headache and dizziness the past 1 week.  No focal deficits on ED exam    Vitals  BP: (!) 143/86   Pulse: 62   Resp: 27   Temp: 97.8  F (36.6  C)   Weight: 70.3 kg (155 lb)    Imaging Findings  CTA head/neck: High-grade narrowing of the distal extracranial internal carotid artery.  Redemonstration of moderate narrowing of the M1 segment of the right middle cerebral artery, similar to the prior exam.Multifocal moderate narrowing of the A2 and A3 segments of the anterior cerebral arteries. Mild to moderate narrowing of the supraclinoid left internal carotid artery.  High-grade critical narrowing of the right internal carotid artery starting at the origin to the skull base   MRI brain with and without contrast:Right caudate body, right internal capsule posterior limb, right hippocampus, right temporal stem, right temporal lobe white matter demonstrate multiple small acute infarcts.     Impression  Acute ischemic stroke of right caudate and internal capsule and temporal lobe due to other etiology (artery to artery embolism)      Recommendations  - Use orderset: \"Ischemic Stroke Routine Admission\" or \"Ischemic Stroke No Thrombolytics/No Thrombectomy ICU Admission\"  - Place Neurology IP Stroke Consult order   - Neurochecks and Vital Signs every every 4 hours   - Permissive HTN; goal SBP < 220 mmHg  - Daily aspirin 81 mg for secondary stroke prevention  - Plavix (clopidogrel) 300 mg PO loading dose x 1  - Plavix (clopidogrel) 75 mg PO Daily  - Statin: After lipid profile  - TTE (with Bubble Study if age 60 yrs or less)  - Telemetry, EKG  - Bedside Glucose Monitoring  - A1c, Lipid Panel, Troponin x 3  - PT/OT/SLP  - Stroke Education  - Euthermia, " "Euglycemia    My recommendations are based on the information provided over the phone by Sanchez Muñoz's in-person providers. They are not intended to replace the clinical judgment of his in-person providers. I was not requested to personally see or examine the patient at this time.     The Stroke Staff is Dr. Callaway.    Li Cruz MD  Vascular Neurology Fellow    To page me or covering stroke neurology team member, click here: [email or web address]  Choose \"On Call\" tab at top, then select \"NEUROLOGY/ALL SITES\" from middle drop-down box, press Enter, then look for \"stroke\" or \"telestroke\" for your site.   "

## 2024-07-09 NOTE — PROGRESS NOTES
Speech-Language Pathology: Clinical Swallow Evaluation     07/09/24 0900   Appointment Info   Signing Clinician's Name / Credentials (SLP) Lizette Mathur MA CCC-SLP   General Information   Onset of Illness/Injury or Date of Surgery 07/08/24   Referring Physician Dr. Rebolledo   Pertinent History of Current Problem Per EMR: 39 year old male with a medical history of CVA, anxiety disorder, hypertension, hyperlipidemia, chronic back pain with radiculopathy, type 2 DM, vitamin D deficiency and other medical co morbidities who is being admitted for further evaluation of acute embolic CVA   General Observations Alert and cooperative   Type of Evaluation   Type of Evaluation Swallow Evaluation   Oral Motor   Oral Musculature generally intact   Mucosal Quality good   Dentition (Oral Motor)   Dentition (Oral Motor) adequate dentition   Facial Symmetry (Oral Motor)   Facial Symmetry (Oral Motor) WNL   Lip Function (Oral Motor)   Lip Range of Motion (Oral Motor) WNL   Lip Strength (Oral Motor) WNL   Tongue Function (Oral Motor)   Tongue Strength (Oral Motor) WNL   Tongue Coordination/Speed (Oral Motor) WNL   Tongue ROM (Oral Motor) WNL   Jaw Function (Oral Motor)   Jaw Function (Oral Motor) WNL   Vocal Quality/Secretion Management (Oral Motor)   Vocal Quality (Oral Motor) WNL   Secretion Management (Oral Motor) WNL   General Swallowing Observations   Past History of Dysphagia None per EMR, RN, or patient report.   Current Diet/Method of Nutritional Intake (General Swallowing Observations, NIS) regular diet;thin liquids (level 0)   Swallowing Evaluation Clinical swallow evaluation   Clinical Swallow Evaluation   Clinical Swallow Evaluation Textures Trialed thin liquids;solid foods   Clinical Swallow Eval: Thin Liquid Texture Trial   Mode of Presentation, Thin Liquids cup;straw   Oral Phase of Swallow WFL   Pharyngeal Phase of Swallow intact   Diagnostic Statement No overt s/s aspiration   Clinical Swallow Evaluation: Solid  Food Texture Trial   Mode of Presentation self-fed   Oral Phase WFL   Pharyngeal Phase intact   Diagnostic Statement No overt s/s aspiration   Esophageal Phase of Swallow   Patient reports or presents with symptoms of esophageal dysphagia No   Swallowing Recommendations   Diet Consistency Recommendations regular diet;thin liquids (level 0)   Recommended Feeding/Eating Techniques (Swallow Eval) maintain upright sitting position for eating   Medication Administration Recommendations, Swallowing (SLP) Per patient preference   Instrumental Assessment Recommendations instrumental evaluation not recommended at this time   Comment, Swallowing Recommendations No s/s aspiration or dysphagia.   Clinical Impression   Criteria for Skilled Therapeutic Interventions Met (SLP Eval) Evaluation only   SLP Diagnosis Dysphagia   Risks & Benefits of therapy have been explained evaluation/treatment results reviewed;care plan/treatment goals reviewed;participants voiced agreement with care plan;participants included;patient   Clinical Impression Comments Patient participated in Clinical Swallow Evaluation. No s/s aspiration with any intake. Oral motor was WFL. Mastication was adequate. Hyolaryngeal movement was present. Patient denies any changes to speech or word finding, speech is noted to be clear, full evaluation not warranted. Recommend diet listed above. No ongoing SLP needs at this time. Please contact SLP with any questions or concerns.   SLP Total Evaluation Time   Eval: oral/pharyngeal swallow function, clinical swallow Minutes (18676) 20   SLP Discharge Planning   SLP Discharge Recommendation other (see comments)   SLP Rationale for DC Rec No ST needs post-d/c   SLP Brief overview of current status  Recommend regular textures and thin liquids. No anticipated SLP needs at this time. Please contact SLP with any questions or concerns.   Total Session Time   Total Session Time (sum of timed and untimed services) 20

## 2024-07-09 NOTE — PROGRESS NOTES
Brief progress note.  Review H&P from today for more details.      Massiel is a 39-year-old male with past medical history of diabetes, hypertension, hyperlipidemia, mood disorder was admitted with dizziness and lightheadedness.  Workup with CT and CTA head revealed high-grade narrowing of distal extracranial internal carotid artery.  Also revealed high-grade critical narrowing of the right internal carotid artery starting at the region of the skull base.  MRI revealed multiple small acute infarcts.  Carotid ultrasound revealed high-grade/critical ICA stenosis on the right and less than 50% stenosis in the left ICA.  Workup with A1c was 8.1, reviewed lipid panel.  Echo revealed ejection fraction of 60 to 65% and negative bubble study.    Patient started on aspirin and Plavix for 3 months followed by aspirin monotherapy.    Plan:  PT/OT following, recommend home on discharge  Vascular surgery consulted  Patient will need 30-day event monitor on discharge.  Appreciate neurology input, signed off now.  Allow permissive HTN for now, better control tomorrow.    Anticipate discharge tomorrow.    Sakina Rebolledo MD  Hospitalist

## 2024-07-10 ENCOUNTER — APPOINTMENT (OUTPATIENT)
Dept: PHYSICAL THERAPY | Facility: HOSPITAL | Age: 39
DRG: 066 | End: 2024-07-10
Payer: MEDICARE

## 2024-07-10 ENCOUNTER — APPOINTMENT (OUTPATIENT)
Dept: OCCUPATIONAL THERAPY | Facility: HOSPITAL | Age: 39
DRG: 066 | End: 2024-07-10
Payer: MEDICARE

## 2024-07-10 ENCOUNTER — VIRTUAL VISIT (OUTPATIENT)
Dept: INTERPRETER SERVICES | Facility: CLINIC | Age: 39
End: 2024-07-10
Payer: COMMERCIAL

## 2024-07-10 VITALS
SYSTOLIC BLOOD PRESSURE: 122 MMHG | RESPIRATION RATE: 18 BRPM | HEART RATE: 82 BPM | WEIGHT: 315 LBS | HEIGHT: 62 IN | OXYGEN SATURATION: 97 % | DIASTOLIC BLOOD PRESSURE: 86 MMHG | BODY MASS INDEX: 57.97 KG/M2 | TEMPERATURE: 97.7 F

## 2024-07-10 LAB
CRP SERPL HS-MCNC: 0.55 MG/L
ERYTHROCYTE [SEDIMENTATION RATE] IN BLOOD BY WESTERGREN METHOD: 22 MM/HR (ref 0–15)
GLUCOSE BLDC GLUCOMTR-MCNC: 214 MG/DL (ref 70–99)

## 2024-07-10 PROCEDURE — 250N000013 HC RX MED GY IP 250 OP 250 PS 637: Performed by: INTERNAL MEDICINE

## 2024-07-10 PROCEDURE — 85652 RBC SED RATE AUTOMATED: CPT | Performed by: PHYSICIAN ASSISTANT

## 2024-07-10 PROCEDURE — 86141 C-REACTIVE PROTEIN HS: CPT | Performed by: PHYSICIAN ASSISTANT

## 2024-07-10 PROCEDURE — 97116 GAIT TRAINING THERAPY: CPT | Mod: GP

## 2024-07-10 PROCEDURE — T1013 SIGN LANG/ORAL INTERPRETER: HCPCS | Mod: U4,GT | Performed by: INTERPRETER

## 2024-07-10 PROCEDURE — 97110 THERAPEUTIC EXERCISES: CPT | Mod: GP

## 2024-07-10 PROCEDURE — 97530 THERAPEUTIC ACTIVITIES: CPT | Mod: GP

## 2024-07-10 PROCEDURE — 250N000011 HC RX IP 250 OP 636: Performed by: INTERNAL MEDICINE

## 2024-07-10 PROCEDURE — 99239 HOSP IP/OBS DSCHRG MGMT >30: CPT | Performed by: HOSPITALIST

## 2024-07-10 PROCEDURE — 97535 SELF CARE MNGMENT TRAINING: CPT | Mod: GO

## 2024-07-10 PROCEDURE — 250N000013 HC RX MED GY IP 250 OP 250 PS 637: Performed by: HOSPITALIST

## 2024-07-10 PROCEDURE — 99222 1ST HOSP IP/OBS MODERATE 55: CPT | Performed by: PHYSICIAN ASSISTANT

## 2024-07-10 PROCEDURE — 36415 COLL VENOUS BLD VENIPUNCTURE: CPT | Performed by: PHYSICIAN ASSISTANT

## 2024-07-10 RX ORDER — LISINOPRIL 20 MG/1
20 TABLET ORAL DAILY
Status: DISCONTINUED | OUTPATIENT
Start: 2024-07-10 | End: 2024-07-10 | Stop reason: HOSPADM

## 2024-07-10 RX ORDER — CLOPIDOGREL BISULFATE 75 MG/1
75 TABLET ORAL DAILY
Qty: 90 TABLET | Refills: 0 | Status: SHIPPED | OUTPATIENT
Start: 2024-07-11 | End: 2024-10-09

## 2024-07-10 RX ADMIN — LISINOPRIL 20 MG: 20 TABLET ORAL at 11:40

## 2024-07-10 RX ADMIN — FAMOTIDINE 20 MG: 10 INJECTION, SOLUTION INTRAVENOUS at 00:28

## 2024-07-10 RX ADMIN — CLOPIDOGREL BISULFATE 75 MG: 75 TABLET ORAL at 09:42

## 2024-07-10 RX ADMIN — METFORMIN HYDROCHLORIDE 1000 MG: 500 TABLET, FILM COATED ORAL at 11:40

## 2024-07-10 RX ADMIN — FAMOTIDINE 20 MG: 10 INJECTION, SOLUTION INTRAVENOUS at 11:41

## 2024-07-10 RX ADMIN — ASPIRIN 81 MG CHEWABLE TABLET 81 MG: 81 TABLET CHEWABLE at 09:42

## 2024-07-10 RX ADMIN — FLUTICASONE PROPIONATE 1 SPRAY: 50 SPRAY, METERED NASAL at 09:42

## 2024-07-10 RX ADMIN — DOXYCYCLINE 100 MG: 100 CAPSULE ORAL at 09:42

## 2024-07-10 ASSESSMENT — ACTIVITIES OF DAILY LIVING (ADL)
ADLS_ACUITY_SCORE: 18
ADLS_ACUITY_SCORE: 19
ADLS_ACUITY_SCORE: 18
ADLS_ACUITY_SCORE: 18
ADLS_ACUITY_SCORE: 19

## 2024-07-10 NOTE — CONSULTS
VASCULAR SURGERY INPATIENT CONSULTATION / Initial In-Patient visit    VASCULAR SURGEON: Daksha Benitez MD    LOCATION: [facility]     Sanchez Muñoz  Medical Record #: [id number]  YOB: 1985  Age: 39 year old     Date of Service: 7/8/2024    PRIMARY CARE PROVIDER: Mark Crockett    Reason for consultation: Carotid artery stenosis, stroke    IMPRESSION: 39-year-old male who presented with a one week history of dizziness and lightheadedness.  He was worked up with CTA and MRI demonstrating multiple small acute infarcts of right caudate, internal capsule and temporal lobe and high-grade critical stenosis of the right ICA. Carotid ultrasound confirms high grade/critical stenosis. Denies any other neurologic symptoms. Has been started on dual antiplatelet therapy and statin.    RECOMMENDATION: Continue best medical management with DAPT and high intensity statin therapy.  Will plan for a diagnostic cerebral angiogram with neurointerventional radiology team.  This can be done on an outpatient basis if patient is stable for discharge today. Further plans to follow cerebral angiogram.    HPI:  Sanchez Muñoz is a 39 year old male with past medical history significant for hypertension, hyperlipidemia, history of CVA, and diabetes mellitus on insulin who presented for evaluation of dizziness/lightheadedness for the past week.  Patient was worked up with CTA and MRI demonstrating acute ischemic stroke of right caudate and internal capsule and temporal lobe as well as critical stenosis versus occlusion of the right ICA for which vascular surgery was consulted.    Today, Mr. Muñoz is evaluated at the bedside. An  is used to help translate our visit.  He continues to experience dizziness and lightheadedness today.  Denies any vision changes, slurred speech, facial asymmetry, unilateral extremity numbness or weakness, or other neurologic symptoms. Patient was on aspirin and statin prior to admission but  is noted to be noncompliant. He does not smoke. A1c on admission is 8.1. Denies any previous neck surgeries or radiation.    Discussed recommendations for cerebral angiogram and possible carotid intervention pending angiogram findings. All questions answered. No other concerns.     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    Hypertension     Hypertriglyceridemia     Lumbar radiculopathy     Tinea versicolor     Uncontrolled diabetes mellitus     on insulin    Vitamin D deficiency        Past Surgical History:   Procedure Laterality Date    IR MISCELLANEOUS PROCEDURE  8/24/2013    IR MISCELLANEOUS PROCEDURE  8/24/2013    IR MISCELLANEOUS PROCEDURE  8/24/2013    IR MISCELLANEOUS PROCEDURE  8/26/2013    IR MISCELLANEOUS PROCEDURE  8/26/2013    IR MISCELLANEOUS PROCEDURE  9/3/2013      ALLERGIES:   No Known Allergies     MEDS:    Current Facility-Administered Medications:     aspirin (ASA) chewable tablet 81 mg, 81 mg, Oral, Daily, Sakina Rebolledo MD, 81 mg at 07/09/24 0834    atorvastatin (LIPITOR) tablet 80 mg, 80 mg, Oral, QPM, Alise Paul MD, 80 mg at 07/09/24 1954    calcium carbonate (TUMS) chewable tablet 1,000 mg, 1,000 mg, Oral, 4x Daily PRN, Mayelin Irizarry MD    clopidogrel (PLAVIX) tablet 75 mg, 75 mg, Oral, Daily, Sakina Rebolledo MD, 75 mg at 07/09/24 0834    doxycycline monohydrate (MONODOX) capsule 100 mg, 100 mg, Oral, Q12H [facility] (08/20), Mayelin Irizarry MD, 100 mg at 07/09/24 1954    famotidine (PEPCID) injection 20 mg, 20 mg, Intravenous, Q12H, Mayelin Irizarry MD, 20 mg at 07/10/24 0028    fluticasone (FLONASE) 50 MCG/ACT spray 1 spray, 1 spray, Both Nostrils, Daily, Mayelin Irizarry MD, 1 spray at 07/09/24 0835    lidocaine (LMX4) cream, , Topical, Q1H PRN, Mayelin Irizarry MD    lidocaine 1 % 0.1-1 mL, 0.1-1 mL, Other, Q1H  "PRN, Mayelin Irizarry MD    ondansetron (ZOFRAN ODT) ODT tab 4 mg, 4 mg, Oral, Q6H PRN **OR** ondansetron (ZOFRAN) injection 4 mg, 4 mg, Intravenous, Q6H PRN, Mayelin Irizarry MD    senna-docusate (SENOKOT-S/PERICOLACE) 8.6-50 MG per tablet 1 tablet, 1 tablet, Oral, BID PRN **OR** senna-docusate (SENOKOT-S/PERICOLACE) 8.6-50 MG per tablet 2 tablet, 2 tablet, Oral, BID PRN, Mayelin Irizarry MD    sodium chloride (PF) 0.9% PF flush 3 mL, 3 mL, Intracatheter, q1 min prnEdie Grace A, MD    sodium chloride (PF) 0.9% PF flush 3 mL, 3 mL, Intracatheter, Q8H, Mayelin Irizarry MD, 3 mL at 07/10/24 0030    sodium chloride (PF) 0.9% PF flush 3 mL, 3 mL, Intracatheter, q1 min prn, Mayelin Irizarry MD     SOCIAL HABITS:    Tobacco Use      Smoking status: Never      Smokeless tobacco: Never     Social History    Substance and Sexual Activity      Alcohol use: No       History   Drug Use No      FAMILY HISTORY:    Family History   Problem Relation Age of Onset    No Known Problems Mother     No Known Problems Father     No Known Problems Sister     No Known Problems Brother     No Known Problems Maternal Grandmother     No Known Problems Maternal Grandfather     No Known Problems Paternal Grandmother     No Known Problems Paternal Grandfather      REVIEW OF SYSTEMS:    A 12 point ROS was reviewed and except for what is listed in the HPI above, all others are negative    PE:    Vital signs:  Temp: 97.9  F (36.6  C) Temp src: Oral BP: (!) 128/90 Pulse: 69   Resp: 20 SpO2: 96 % O2 Device: None (Room air)   Height: 157.5 cm (5' 2\") Weight: 149.2 kg (328 lb 14.8 oz)  Estimated body mass index is 60.16 kg/m  as calculated from the following:    Height as of this encounter: 1.575 m (5' 2\").    Weight as of this encounter: 149.2 kg (328 lb 14.8 oz).    Wt Readings from Last 1 Encounters:   07/09/24 149.2 kg (328 lb 14.8 oz)     Body mass index is 60.16 kg/m .    EXAM:  GENERAL: This is a well-developed 39 year old male who appears his " stated age  CARDIAC: Regular rate and rhythm  CHEST/LUNG: Normal respiratory effort   MUSCULOSKELETAL: Grossly normal and both lower extremities are intact.  HEME/LYMPH: No lymphedema  PSYCH: appropriate affect  INTEGUMENT: No open lesions or ulcers  NEUROLOGIC: Alert and oriented, speech clear, face symmetric, moving all extremities equally    DIAGNOSTIC STUDIES:     Images:    Echocardiogram Complete w Bubble Study - For age < 60 yrs    Result Date: 7/9/2024  Reason For Study: Cerebrovascular Incident      Interpretation Summary    Left ventricular size, wall motion and function are normal. The ejection fraction is 60-65%. Normal right ventricle size and systolic function. A contrast injection (Bubble Study) was performed that was negative for flow across the interatrial septum. No hemodynamically significant valvular abnormalities on 2D or color flow imaging.     US Carotid Bilateral    Result Date: 7/9/2024  INDICATION: High-grade critical narrowing of the right ICA seen on recent CTA.     IMPRESSION:   1.  Right carotid: Very low velocity waveforms throughout the ICA consistent with patient's known high-grade/critical ICA stenosis as seen on CTA earlier today.   2.  Left carotid: Mild plaque formation, velocities consistent with less than 50% stenosis in the left internal carotid artery.   3.  Flow within the vertebral arteries is antegrade.    MR Brain w/o & w Contrast    Result Date: 7/8/2024  INDICATION: Vertigo, intracranial atherosclerosis, poorly managed hypertension     IMPRESSION:   1.  Right caudate body, right internal capsule posterior limb, right hippocampus, right temporal stem, right temporal lobe white matter demonstrate multiple small acute infarcts.   2.  Chronic intracranial changes described above.   3.  Left maxillary sinus small air-fluid level. Please correlate for acute sinusitis.    CTA Head Neck with Contrast    Result Date: 7/8/2024  INDICATION: Headache, dizziness, HTN, history of  prior stroke     IMPRESSION:   HEAD CT:   1.  No intracranial hemorrhage, mass lesions, hydrocephalus or CT evidence for an acute infarct. MRI is more sensitive for the evaluation of acute infarcts.   2.  Mild diffuse cerebral parenchymal volume loss. Presumed chronic hypertensive/microvascular ischemic white matter changes.   3.  Small focus of encephalomalacia involving the lateral right thalamus.   HEAD CTA:   1.  High-grade narrowing of the distal extracranial internal carotid artery, worsened since prior head CTA 08/24/2013 and MRA 08/26/2017.   2.  Redemonstration of moderate narrowing of the M1 segment of the right middle cerebral artery, similar to the prior exam.   3.  Multifocal moderate narrowing of the A2 and A3 segments of the anterior cerebral arteries. Mild to moderate narrowing of the supraclinoid left internal carotid artery.   NECK CTA:   1.  High-grade critical narrowing of the right internal carotid artery starting at the origin to the skull base, worsened since previous MRA 08/26/2017.   2.  No hemodynamically significant narrowing of the left internal carotid artery based on the NASCET criteria.   3.  The vertebral arteries are patent throughout their course in the neck.     LABS:      Sodium   Date Value Ref Range Status   07/09/2024 140 135 - 145 mmol/L Final   07/08/2024 142 135 - 145 mmol/L Final   04/12/2021 139 136 - 145 mmol/L Final     Urea Nitrogen   Date Value Ref Range Status   07/09/2024 14.7 6.0 - 20.0 mg/dL Final   07/08/2024 19.2 6.0 - 20.0 mg/dL Final   04/12/2021 18 8 - 22 mg/dL Final   09/23/2020 21 8 - 22 mg/dL Final   07/29/2019 14 8 - 22 mg/dL Final     Hemoglobin   Date Value Ref Range Status   07/09/2024 14.0 13.3 - 17.7 g/dL Final   07/08/2024 14.0 13.3 - 17.7 g/dL Final   01/22/2018 14.4 14.0 - 18.0 g/dL Final     Platelet Count   Date Value Ref Range Status   07/09/2024 298 150 - 450 10e3/uL Final   07/08/2024 330 150 - 450 10e3/uL Final   01/22/2018 274 140 - 440  thou/Bhavana Final     Total time spent 30 minutes face to face with patient with more than 50% time spent in counseling and coordination of care.    Ilsa Palacios PA-C  [facility] Vascular Surgery

## 2024-07-10 NOTE — PLAN OF CARE
"  Problem: Adult Inpatient Plan of Care  Goal: Absence of Hospital-Acquired Illness or Injury  Intervention: Identify and Manage Fall Risk  Recent Flowsheet Documentation  Taken 7/9/2024 2347 by Nurys Simental RN  Safety Promotion/Fall Prevention:   assistive device/personal items within reach   clutter free environment maintained   nonskid shoes/slippers when out of bed   room door open   safety round/check completed  Intervention: Prevent Skin Injury  Recent Flowsheet Documentation  Taken 7/9/2024 2347 by Nurys Simental RN  Body Position: position changed independently  Intervention: Prevent and Manage VTE (Venous Thromboembolism) Risk  Recent Flowsheet Documentation  Taken 7/9/2024 2347 by Nurys Simental RN  VTE Prevention/Management: SCDs on (sequential compression devices)  Goal: Optimal Comfort and Wellbeing  Outcome: Progressing     Problem: Stroke, Ischemic (Includes Transient Ischemic Attack)  Goal: Improved Communication Skills  Intervention: Optimize Communication Skills  Recent Flowsheet Documentation  Taken 7/9/2024 2347 by Nurys Simental RN  Communication Enhancement Strategies:  utilized  Goal: Optimal Functional Ability  Intervention: Optimize Functional Ability  Recent Flowsheet Documentation  Taken 7/9/2024 2347 by Nurys Simental RN  Activity Management:   activity adjusted per tolerance   ambulated to bathroom   back to bed   Goal Outcome Evaluation:    Patient alert and oriented and able to make needs known.  Language Line utilized for [employer] .  Patient scored 0 on NIH.  Slept throughout the night.    /88 (BP Location: Right arm)   Pulse 76   Temp 97.8  F (36.6  C) (Oral)   Resp 19   Ht 1.575 m (5' 2\")   Wt 149.2 kg (328 lb 14.8 oz)   SpO2 98%   BMI 60.16 kg/m      Nurys Simental RN      "
"  Problem: Adult Inpatient Plan of Care  Goal: Patient-Specific Goal (Individualized)  Description: You can add care plan individualizations to a care plan. Examples of Individualization might be:  \"Parent requests to be called daily at 9am for status\", \"I have a hard time hearing out of my right ear\", or \"Do not touch me to wake me up as it startles  me\".  Outcome: Progressing     Problem: Adult Inpatient Plan of Care  Goal: Absence of Hospital-Acquired Illness or Injury  Intervention: Prevent Skin Injury  Recent Flowsheet Documentation  Taken 7/9/2024 1203 by Madan Serrano, RN  Body Position: position changed independently     Problem: Adult Inpatient Plan of Care  Goal: Optimal Comfort and Wellbeing  Outcome: Progressing    SUBJECTIVE:  Pt a/o with VSS. Neuro checks q4hr: NIH; 0 today with no deficits noted. Arrived to unit at 1200. SBA in the room. Telemetry SR.      Goal Outcome Evaluation:      Plan of Care Reviewed With: patient    Overall Patient Progress: improving    Madan Powers, RN    "
Goal Outcome Evaluation:           Overall Patient Progress: improvingOverall Patient Progress: improving         Pt is discharging to home. Friend May to transport home. Discharge instructions reviewed with pt. Pt belongings and AVS sent with pt.   
Goal Outcome Evaluation:      Plan of Care Reviewed With: patient    Overall Patient Progress: improvingOverall Patient Progress: improving         Denies pain. NIH 1, pt stated he does not keep track of age, culturally appropriate). Tele NSR.  at bedside for assessments this afternoon. SCDs applied and stroke education given to pt.   
Occupational Therapy Discharge Summary    Reason for therapy discharge:    Discharged home.    Progress towards therapy goal(s). See goals on Care Plan in [facility] electronic health record for goal details.  Goals not met d/t limited OT prior to discharge home.    Therapy recommendation(s):    Home w/assist.                            
Physical Therapy Discharge Summary    Reason for therapy discharge:    All goals and outcomes met, no further needs identified.    Progress towards therapy goal(s). See goals on Care Plan in [facility] electronic health record for goal details.  Goals met    Therapy recommendation(s):    Continue home exercise program. Pt is indep with mobility and steps.  Pt c/o constant dizziness with mobility but no LOB.  Pt can continue with his HEP. PT to discontinue at this time.                             
detailed exam

## 2024-07-10 NOTE — DISCHARGE SUMMARY
[facility] MEDICINE  DISCHARGE SUMMARY     Primary Care Physician: Mark Crockett  Admission Date: 7/8/2024   Discharge Provider: Sakina Rebolledo MD Discharge Date: 7/10/2024   Diet:   Cardiac diet    Code Status: Full Code   Activity: DCACTIVITY: Activity as tolerated        Condition at Discharge: Stable     REASON FOR PRESENTATION(See Admission Note for Details)     Dizziness    PRINCIPAL & ACTIVE DISCHARGE DIAGNOSES     Active Problems:    Dizziness    Ac isch multi vasc territories stroke (H)      PENDING LABS     Unresulted Labs Ordered in the Past 30 Days of this Admission       Date and Time Order Name Status Description    7/10/2024  7:44 AM C-Reactive Protein, High Sensitivity In process               PROCEDURES ( this hospitalization only)          RECOMMENDATIONS TO OUTPATIENT PROVIDER FOR F/U VISIT     Follow-up Appointments     Follow-up and recommended labs and tests       Follow up with primary care provider, Mark Crockett, within 7 days for   hospital follow- up.  No follow up labs or test are needed.    Follow up with vascular in 2-3 weeks.   Needs out-pt cerebral angiogram with Neuro IR, order placed.            DISPOSITION     Home    SUMMARY OF HOSPITAL COURSE:      Brief progress note.  Review H&P from today for more details.        Sanchez is a 39-year-old male with past medical history of diabetes, hypertension, hyperlipidemia, mood disorder was admitted with dizziness and lightheadedness.  Workup with CT and CTA head revealed high-grade narrowing of distal extracranial internal carotid artery.  Also revealed high-grade critical narrowing of the right internal carotid artery starting at the region of the skull base.  MRI revealed multiple small acute infarcts.  Carotid ultrasound revealed high-grade/critical ICA stenosis on the right and less than 50% stenosis in the left ICA.  Workup with A1c was 8.1, reviewed lipid panel.  Echo revealed ejection fraction of 60 to  65% and negative bubble study.    Pt was evaluated by vascular surgery inpatient and recommended a diagnostic cerebral angiogram by neurointerventional radiology team.  Due to patient not being n.p.o. and also scheduling conflicts, it was recommended that this could be done as outpatient at the [facility].  Order placed as per recommendation by IR NP.     Patient will be on aspirin and Plavix for 3 months followed by aspirin monotherapy.  Patient will need close follow-up with his primary care for management of his risk factors.    .Patient stable to be discharged home today. Please refer to discharge medications and instructions for more details.      Discharge Medications with Med changes:     Current Discharge Medication List        START taking these medications    Details   clopidogrel (PLAVIX) 75 MG tablet Take 1 tablet (75 mg) by mouth daily for 90 days  Qty: 90 tablet, Refills: 0    Associated Diagnoses: Cerebrovascular accident (CVA), unspecified mechanism (H)      meclizine (ANTIVERT) 25 MG tablet Take 1 tablet (25 mg) by mouth 3 times daily as needed for dizziness  Qty: 20 tablet, Refills: 0           CONTINUE these medications which have NOT CHANGED    Details   aspirin 81 MG EC tablet [ASPIRIN 81 MG EC TABLET] Take 1 tablet (81 mg total) by mouth daily. To prevent stroke  Qty: 90 tablet, Refills: 6    Comments: [place] [facility]  Associated Diagnoses: Type 2 diabetes mellitus with hyperglycemia, with long-term current use of insulin (H); Stroke due to intracerebral hemorrhage (H)      atorvastatin (LIPITOR) 80 MG tablet Take 80 mg by mouth daily      dulaglutide (TRULICITY) 1.5 mg/0.5 mL PnIj [DULAGLUTIDE (TRULICITY) 1.5 MG/0.5 ML PNIJ] Inject 1.5 mg under the skin every 7 days. For blood sugars  Qty: 6 mL, Refills: 6    Comments: Please write directions in [facility]  Associated Diagnoses: Type 2 diabetes mellitus with hyperglycemia, with long-term current use of insulin (H)       ergocalciferol (VITAMIN D2) 1,250 mcg (50,000 unit) capsule [ERGOCALCIFEROL (VITAMIN D2) 1,250 MCG (50,000 UNIT) CAPSULE] Take 1 capsule (50,000 Units total) by mouth once a week. For low Vitamin D  Qty: 12 capsule, Refills: 0    Comments: Please write directions in Hmong  Associated Diagnoses: Vitamin D deficiency      insulin glargine (BASAGLAR KWIKPEN) 100 unit/mL (3 mL) pen [INSULIN GLARGINE (BASAGLAR KWIKPEN) 100 UNIT/ML (3 ML) PEN] Inject 37 Units under the skin daily. Please write directions in Hmong  Qty: 45 mL, Refills: 3    Comments: If BASAGLAR is not covered by insurance, may substitute LANTUS at same dose and frequency.    Associated Diagnoses: Type 2 diabetes mellitus with hyperglycemia, with long-term current use of insulin (H)      lisinopril (ZESTRIL) 20 MG tablet Take 20 mg by mouth daily      metFORMIN (GLUCOPHAGE) 1000 MG tablet [METFORMIN (GLUCOPHAGE) 1000 MG TABLET] Take 1 tablet (1,000 mg total) by mouth 2 (two) times a day with meals.  Qty: 180 tablet, Refills: 3    Associated Diagnoses: Type 2 diabetes mellitus with hyperglycemia, with long-term current use of insulin (H)      sertraline (ZOLOFT) 50 MG tablet [SERTRALINE (ZOLOFT) 50 MG TABLET] TAKE 1 TABLET DAILY FOR DEPRESSION // 1 HNUB NOJ 1 LUB PAB CHASE NYUAB SIAB  Qty: 30 tablet, Refills: 6    Associated Diagnoses: Severe episode of recurrent major depressive disorder, without psychotic features (H)                   Rationale for medication changes:      See med rec        Consults       NEUROLOGY IP STROKE CONSULT  NEUROLOGY IP STROKE CONSULT  SPEECH LANGUAGE PATH ADULT IP CONSULT  PHARMACY IP CONSULT  PHARMACY IP CONSULT  PHARMACY IP CONSULT  PHYSICAL THERAPY ADULT IP CONSULT  OCCUPATIONAL THERAPY ADULT IP CONSULT  REHAB ADMISSIONS LIAISON IP CONSULT  CARE MANAGEMENT / SOCIAL WORK IP CONSULT  NEUROLOGY IP CONSULT  VASCULAR SURGERY IP CONSULT  INTERVENTIONAL RADIOLOGY ADULT/PEDS IP CONSULT  SMOKING CESSATION PROGRAM IP  "CONSULT    Immunizations given this encounter     Most Recent Immunizations   Administered Date(s) Administered    Influenza Vaccine >6 months,quad, PF 09/23/2014    TDAP (Adacel,Boostrix) 10/05/2016           Anticoagulation Information      Recent INR results: No results for input(s): \"INR\" in the last 168 hours.  Warfarin doses (if applicable) or name of other anticoagulant: N/A      SIGNIFICANT IMAGING FINDINGS     Results for orders placed or performed during the hospital encounter of 07/08/24   CTA Head Neck with Contrast    Impression    IMPRESSION:   HEAD CT:  1.  No intracranial hemorrhage, mass lesions, hydrocephalus or CT evidence for an acute infarct. MRI is more sensitive for the evaluation of acute infarcts.  2.  Mild diffuse cerebral parenchymal volume loss. Presumed chronic hypertensive/microvascular ischemic white matter changes.  3.  Small focus of encephalomalacia involving the lateral right thalamus.    HEAD CTA:  1.  High-grade narrowing of the distal extracranial internal carotid artery, worsened since prior head CTA 08/24/2013 and MRA 08/26/2017.  2.  Redemonstration of moderate narrowing of the M1 segment of the right middle cerebral artery, similar to the prior exam.  3.  Multifocal moderate narrowing of the A2 and A3 segments of the anterior cerebral arteries. Mild to moderate narrowing of the supraclinoid left internal carotid artery.    NECK CTA:  1.  High-grade critical narrowing of the right internal carotid artery starting at the origin to the skull base, worsened since previous MRA 08/26/2017.  2.  No hemodynamically significant narrowing of the left internal carotid artery based on the NASCET criteria.  3.  The vertebral arteries are patent throughout their course in the neck.     MR Brain w/o & w Contrast    Impression    IMPRESSION:  1.  Right caudate body, right internal capsule posterior limb, right hippocampus, right temporal stem, right temporal lobe white matter demonstrate " multiple small acute infarcts.     2.  Chronic intracranial changes described above.    3.  Left maxillary sinus small air-fluid level. Please correlate for acute sinusitis.   US Carotid Bilateral    Impression    IMPRESSION:  1.  Right carotid: Very low velocity waveforms throughout the ICA consistent with patient's known high-grade/critical ICA stenosis as seen on CTA earlier today.  2.  Left carotid: Mild plaque formation, velocities consistent with less than 50% stenosis in the left internal carotid artery.  3.  Flow within the vertebral arteries is antegrade.   Echocardiogram Complete w Bubble Study - For age < 60 yrs   Result Value Ref Range    LVEF  60-65%        SIGNIFICANT LABORATORY FINDINGS         Discharge Orders        IR Referral     Primary Care Referral      Reason for your hospital stay    Acute stroke     Activity    Your activity upon discharge: activity as tolerated     Follow-up and recommended labs and tests     Follow up with primary care provider, Mark Crockett, within 7 days for hospital follow- up.  No follow up labs or test are needed.    Follow up with vascular in 2-3 weeks.   Needs out-pt cerebral angiogram with Neuro IR, order placed.     Diet    Follow this diet upon discharge: Orders Placed This Encounter      Advance Diet as Tolerated: Cardiac diet       Examination   Physical Exam   Temp:  [97.7  F (36.5  C)-98.3  F (36.8  C)] 97.7  F (36.5  C)  Pulse:  [69-82] 82  Resp:  [17-20] 18  BP: (122-148)/(54-90) 122/86  SpO2:  [96 %-98 %] 97 %  Wt Readings from Last 1 Encounters:   07/09/24 149.2 kg (328 lb 14.8 oz)       General: Pleasant, NAD  HEENT:EOMI, AT,NC  CVS:RRR, no edema  RS:CTAB  Abd: Soft, NT,ND  Neurology:Grossly normal  Psy:Approrpiate affect        Please see EMR for more detailed significant labs, imaging, consultant notes etc.    ISakina MD, personally saw the patient today and spent greater than 30 minutes discharging this patient.    MD KING Nam  [facility]    CC:Mark Crockett

## 2024-07-10 NOTE — PROGRESS NOTES
Per Lucía Freeman in [facility] paharmacy, patient has 100% coverage for accu-meter and supplies under Medicare B.

## 2024-07-10 NOTE — PHARMACY-CONSULT NOTE
Pharmacy Consult to evaluate for medication related stroke core measures    Sanchez Muñoz, 39 year old male admitted for acute embolic CVA on 7/8/2024.    Thrombolytic was not given per [facility] stroke neurology recommendation.    VTE Prophylaxis SCDs /PCDs placed on 7/9, as appropriate prior to end of hospital day 2.    Antithrombotic: aspirin and clopidogrel started on 7/9, as appropriate by end of hospital day 2. Continue antithrombotic therapy on discharge to meet quality measures, unless contraindicated.    Anticoagulation if history of A-fib/flutter: Patient does not have history of A-fib/flutter - anticoagulation not required for medication related stroke core measures.     LDL Cholesterol Calculated   Date Value Ref Range Status   07/08/2024   Final     Comment:     Cannot estimate LDL when triglyceride exceeds 400 mg/dL     LDL Cholesterol Direct   Date Value Ref Range Status   09/23/2020 142 (H) <=129 mg/dl Final       Patient's home statin, Lipitor (atorvastatin) restarted; continue statin on discharge to meet quality measures, unless contraindicated.     Recommendations: None at this time    Thank you for the consult.    ILANA ANGELA [facility] 7/10/2024 8:49 AM

## 2024-07-11 ENCOUNTER — TELEPHONE (OUTPATIENT)
Dept: VASCULAR SURGERY | Facility: CLINIC | Age: 39
End: 2024-07-11
Payer: MEDICARE

## 2024-07-11 ENCOUNTER — PATIENT OUTREACH (OUTPATIENT)
Dept: CARE COORDINATION | Facility: CLINIC | Age: 39
End: 2024-07-11
Payer: MEDICARE

## 2024-07-11 ENCOUNTER — APPOINTMENT (OUTPATIENT)
Dept: INTERPRETER SERVICES | Facility: CLINIC | Age: 39
End: 2024-07-11
Payer: MEDICARE

## 2024-07-11 NOTE — TELEPHONE ENCOUNTER
Spoke with patient and added to Dr. Raymond schedule on 7/22. LM with friend/, May, per pt to provide appt details. [phone]  ________________________________  ----- Message from Caridad GARZA sent at 7/10/2024  1:54 PM CDT -----  Regarding: FW: Follow Up  Please schedule with fellow or Dr. Benitez in 2 weeks  ----- Message -----  From: Ilsa Palacios PA-C  Sent: 7/10/2024   1:51 PM CDT  To: [facility] Support Pool  Subject: Follow Up                                        Can we schedule this pt in clinic in 2 weeks? Should be with fellow or Dr. ENRIQUETA Sharma

## 2024-07-11 NOTE — PROGRESS NOTES
[facility]: [facility]    Background: Transitional Care Management program identified per system criteria and reviewed by [facility] [place] team for possible outreach.    Assessment: Upon chart review, [facility] Team member will not proceed with patient outreach related to this episode of Transitional Care Management program due to reason below:    Patient has a follow up appointment with an appropriate provider today for hospital discharge.    Plan: Transitional Care Management episode addressed appropriately per reason noted above.      Xiao Eaton MA  [facility] [place], [facility]    *Connected Care Resource Team does NOT follow patient ongoing. Referrals are identified based on internal discharge reports and the outreach is to ensure patient has an understanding of their discharge instructions.

## 2024-07-15 ENCOUNTER — TELEPHONE (OUTPATIENT)
Dept: NEUROSURGERY | Facility: CLINIC | Age: 39
End: 2024-07-15
Payer: MEDICARE

## 2024-07-15 ENCOUNTER — APPOINTMENT (OUTPATIENT)
Dept: INTERPRETER SERVICES | Facility: CLINIC | Age: 39
End: 2024-07-15
Payer: MEDICARE

## 2024-07-15 NOTE — TELEPHONE ENCOUNTER
Called patient to see if he can do a phone appointment with Dr. Marie tomorrow. Patient agreed to phone call form Dr. Marie tomorrow at 0845 via . Rn held spot and sent message to scheduling.   Silvia Salgado RN 7/15/2024 2:32 PM

## 2024-07-16 ENCOUNTER — VIRTUAL VISIT (OUTPATIENT)
Dept: NEUROSURGERY | Facility: CLINIC | Age: 39
End: 2024-07-16
Attending: HOSPITALIST
Payer: MEDICARE

## 2024-07-16 ENCOUNTER — PRE VISIT (OUTPATIENT)
Dept: RADIOLOGY | Facility: CLINIC | Age: 39
End: 2024-07-16

## 2024-07-16 DIAGNOSIS — I65.21 STENOSIS OF RIGHT CAROTID ARTERY: ICD-10-CM

## 2024-07-16 DIAGNOSIS — R42 DIZZINESS: ICD-10-CM

## 2024-07-16 PROCEDURE — T1013 SIGN LANG/ORAL INTERPRETER: HCPCS | Mod: U4

## 2024-07-16 PROCEDURE — 99441 PR PHYSICIAN TELEPHONE EVALUATION 5-10 MIN: CPT | Mod: 93 | Performed by: RADIOLOGY

## 2024-07-16 NOTE — PROGRESS NOTES
"Virtual Visit Details    Type of service:  Telephone Visit   Phone call duration: *** minutes   Originating Location (pt. Location): {patient location:317754::\"Home\"}  {PROVIDER LOCATION On-site should be selected for visits conducted from your clinic location or adjoining [facility] hospital, academic office, or other nearby [facility] building. Off-site should be selected for all other provider locations, including home:[id number]}  Distant Location (provider location):  {virtual location provider:[id number]}  "

## 2024-07-16 NOTE — TELEPHONE ENCOUNTER
RECORDS RECEIVED FROM: Internal    REASON FOR VISIT: Stenosis of right carotid artery   PROVIDER: Carlton Marie MD   DATE OF APPT: 7/16/24 @ 8:45 am    NOTES (FOR ALL VISITS) STATUS DETAILS   OFFICE NOTE from referring provider Internal [facility] Referral    OFFICE NOTE from other specialist Care Everywhere 7/11/24 Donavan Núñez MD  @[facility]     DISCHARGE SUMMARY from hospital Internal 7/8/24-7/10/24 Sakina Rebolledo MD @[facility]     MEDICATION LIST Internal    IMAGING  (FOR ALL VISITS)     ULTRASOUND (CAROTID BILAT) *VASCULAR* Internal [facility]  7/9/24 US Carotid Bilat     MRI (HEAD, NECK, SPINE) Internal [facility]  7/8/24 MR Brain     CT (HEAD, NECK, SPINE) Internal [facility]  7/8/24 CTA Head Neck

## 2024-07-16 NOTE — PROGRESS NOTES
[facility] NEUROSURGERY CLINIC [address] [address]  Phone: [phone]  Fax: [phone]    Neuroendovascular Clinic Note:    Reason for clinic visit:  Symptomatic R ICA stenosis    History of present illness:    39 year old male with history of hyperlipidemia, hypertension, type 2 diabetes, right carotid stenosis presents with headache and dizziness found to have acute ischemic stroke of right caudate and internal capsule and temporal lobe . He denies neurological deficits other than his known previous recurrent dizziness. Most recent carotid ultrasound shows high grade right ICA stenosis.    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 per clinic notes    Hypertension     Hypertriglyceridemia     Lumbar radiculopathy     Tinea versicolor     Uncontrolled diabetes mellitus     on insulin    Vitamin D deficiency        Past Surgical History:  Past Surgical History:   Procedure Laterality Date    IR MISCELLANEOUS PROCEDURE  8/24/2013    IR MISCELLANEOUS PROCEDURE  8/24/2013    IR MISCELLANEOUS PROCEDURE  8/24/2013    IR MISCELLANEOUS PROCEDURE  8/26/2013    IR MISCELLANEOUS PROCEDURE  8/26/2013    IR MISCELLANEOUS PROCEDURE  9/3/2013       Social History:  Social History     Socioeconomic History    Marital status: Single     Spouse name: Not on file    Number of children: Not on file    Years of education: Not on file    Highest education level: Not on file   Occupational History    Not on file   Tobacco Use    Smoking status: Never    Smokeless tobacco: Never   Substance and Sexual Activity    Alcohol use: No    Drug use: No    Sexual activity: Not on file   Other Topics Concern    Not on file   Social History Narrative    Not on file     Social Determinants of Health     Financial Resource  Strain: Low Risk  (7/11/2024)    Received from [facility]    Financial Resource Strain     Difficulty of Paying Living Expenses: 3     Difficulty of Paying Living Expenses: Not on file   Food Insecurity: No Food Insecurity (7/11/2024)    Received from [facility]    Food Insecurity     Worried About Running Out of Food in the Last Year: 1   Transportation Needs: No Transportation Needs (7/11/2024)    Received from [facility]    Transportation Needs     Lack of Transportation (Medical): 1   Physical Activity: Not on file   Stress: Not on file   Social Connections: Socially Integrated (7/11/2024)    [facility]    Social Connections     Frequency of Communication with Friends and Family: 0   Interpersonal Safety: Not on file   Housing Stability: Low Risk  (7/11/2024)    [facility]    Housing Stability     Unable to Pay for Housing in the Last Year: 1       Family History:  Family History   Problem Relation Age of Onset    No Known Problems Mother     No Known Problems Father     No Known Problems Sister     No Known Problems Brother     No Known Problems Maternal Grandmother     No Known Problems Maternal Grandfather     No Known Problems Paternal Grandmother     No Known Problems Paternal Grandfather        Home Medications:  Current Outpatient Medications   Medication Sig Dispense Refill    aspirin 81 MG EC tablet [ASPIRIN 81 MG EC TABLET] Take 1 tablet (81 mg total) by mouth daily. To prevent stroke 90 tablet 6    atorvastatin (LIPITOR) 80 MG tablet Take 80 mg by mouth daily      clopidogrel (PLAVIX) 75 MG tablet Take 1 tablet (75 mg) by mouth daily for 90 days 90 tablet 0    dulaglutide (TRULICITY) 1.5 mg/0.5 mL PnIj [DULAGLUTIDE (TRULICITY) 1.5 MG/0.5 ML PNIJ] Inject 1.5 mg under the skin every 7 days. For blood sugars 6  mL 6    ergocalciferol (VITAMIN D2) 1,250 mcg (50,000 unit) capsule [ERGOCALCIFEROL (VITAMIN D2) 1,250 MCG (50,000 UNIT) CAPSULE] Take 1 capsule (50,000 Units total) by mouth once a week. For low Vitamin D 12 capsule 0    insulin glargine (BASAGLAR KWIKPEN) 100 unit/mL (3 mL) pen [INSULIN GLARGINE (BASAGLAR KWIKPEN) 100 UNIT/ML (3 ML) PEN] Inject 37 Units under the skin daily. Please write directions in Hmong 45 mL 3    lisinopril (ZESTRIL) 20 MG tablet Take 20 mg by mouth daily      meclizine (ANTIVERT) 25 MG tablet Take 1 tablet (25 mg) by mouth 3 times daily as needed for dizziness 20 tablet 0    metFORMIN (GLUCOPHAGE) 1000 MG tablet [METFORMIN (GLUCOPHAGE) 1000 MG TABLET] Take 1 tablet (1,000 mg total) by mouth 2 (two) times a day with meals. 180 tablet 3    sertraline (ZOLOFT) 50 MG tablet [SERTRALINE (ZOLOFT) 50 MG TABLET] TAKE 1 TABLET DAILY FOR DEPRESSION // 1 HNUB NOJ 1 LUB PAB CHASE NYUAB SIAB 30 tablet 6     No current facility-administered medications for this visit.           Impression:  -Symptomatic right ICA stenosis resulting in right side ischemic stroke with ongoing dizziness    Discussed with patient extensively regarding description of stenting procedure, benefits and risks with the use of viVood phone .      Plan:  -Plan for right ICA stenting, Dr. Marie will call patient later today to confirm patient final decision        Kerry Wiley MD   ESN Fellow  Pager: [phone]    I personally called the patient and I discussed the plan in detail. Patient was in agreement with plan to treat and requested general anesthesia due to extreme anxiety. We will arrange for stenting under GETA.

## 2024-07-16 NOTE — LETTER
7/16/2024       RE: Sanchez Muñoz  [address]  [address]  [place] [address]       Dear Colleague,    Thank you for referring your patient, Sanchez Muñoz, to the [facility]UROS[facility]ERY [facility] [place] at [facility]. Please see a copy of my visit note below.          [facility] [place]  [address]  3RD FLOOR  [facility] [address]  Phone: [phone]  Fax: [phone]    Neuroendovascular Clinic Note:    Reason for clinic visit:  Symptomatic R ICA stenosis    History of present illness:    39 year old male with history of hyperlipidemia, hypertension, type 2 diabetes, right carotid stenosis presents with headache and dizziness found to have acute ischemic stroke of right caudate and internal capsule and temporal lobe . He denies neurological deficits other than his known previous recurrent dizziness. Most recent carotid ultrasound shows high grade right ICA stenosis.    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 per clinic notes    Hypertension     Hypertriglyceridemia     Lumbar radiculopathy     Tinea versicolor     Uncontrolled diabetes mellitus     on insulin    Vitamin D deficiency        Past Surgical History:  Past Surgical History:   Procedure Laterality Date    IR MISCELLANEOUS PROCEDURE  8/24/2013    IR MISCELLANEOUS PROCEDURE  8/24/2013    IR MISCELLANEOUS PROCEDURE  8/24/2013    IR MISCELLANEOUS PROCEDURE  8/26/2013    IR MISCELLANEOUS PROCEDURE  8/26/2013    IR MISCELLANEOUS PROCEDURE  9/3/2013       Social History:  Social History     Socioeconomic History    Marital status: Single     Spouse name: Not on file    Number of children: Not on file    Years of education: Not on file    Highest education level: Not on file   Occupational History    Not on  file   Tobacco Use    Smoking status: Never    Smokeless tobacco: Never   Substance and Sexual Activity    Alcohol use: No    Drug use: No    Sexual activity: Not on file   Other Topics Concern    Not on file   Social History Narrative    Not on file     Social Determinants of Health     Financial Resource Strain: Low Risk  (7/11/2024)    [facility]    Financial Resource Strain     Difficulty of Paying Living Expenses: 3     Difficulty of Paying Living Expenses: Not on file   Food Insecurity: No Food Insecurity (7/11/2024)    [facility]    Food Insecurity     Worried About Running Out of Food in the Last Year: 1   Transportation Needs: No Transportation Needs (7/11/2024)    [facility]    Transportation Needs     Lack of Transportation (Medical): 1   Physical Activity: Not on file   Stress: Not on file   Social Connections: Socially Integrated (7/11/2024)    [facility]    Social Connections     Frequency of Communication with Friends and Family: 0   Interpersonal Safety: Not on file   Housing Stability: Low Risk  (7/11/2024)    [facility]    Housing Stability     Unable to Pay for Housing in the Last Year: 1       Family History:  Family History   Problem Relation Age of Onset    No Known Problems Mother     No Known Problems Father     No Known Problems Sister     No Known Problems Brother     No Known Problems Maternal Grandmother     No Known Problems Maternal Grandfather     No Known Problems Paternal Grandmother     No Known Problems Paternal Grandfather        Home Medications:  Current Outpatient Medications   Medication Sig Dispense Refill    aspirin 81 MG EC tablet [ASPIRIN 81 MG EC TABLET] Take 1 tablet (81 mg total) by mouth daily. To prevent stroke 90 tablet 6     atorvastatin (LIPITOR) 80 MG tablet Take 80 mg by mouth daily      clopidogrel (PLAVIX) 75 MG tablet Take 1 tablet (75 mg) by mouth daily for 90 days 90 tablet 0    dulaglutide (TRULICITY) 1.5 mg/0.5 mL PnIj [DULAGLUTIDE (TRULICITY) 1.5 MG/0.5 ML PNIJ] Inject 1.5 mg under the skin every 7 days. For blood sugars 6 mL 6    ergocalciferol (VITAMIN D2) 1,250 mcg (50,000 unit) capsule [ERGOCALCIFEROL (VITAMIN D2) 1,250 MCG (50,000 UNIT) CAPSULE] Take 1 capsule (50,000 Units total) by mouth once a week. For low Vitamin D 12 capsule 0    insulin glargine (BASAGLAR KWIKPEN) 100 unit/mL (3 mL) pen [INSULIN GLARGINE (BASAGLAR KWIKPEN) 100 UNIT/ML (3 ML) PEN] Inject 37 Units under the skin daily. Please write directions in Symptom.lyong 45 mL 3    lisinopril (ZESTRIL) 20 MG tablet Take 20 mg by mouth daily      meclizine (ANTIVERT) 25 MG tablet Take 1 tablet (25 mg) by mouth 3 times daily as needed for dizziness 20 tablet 0    metFORMIN (GLUCOPHAGE) 1000 MG tablet [METFORMIN (GLUCOPHAGE) 1000 MG TABLET] Take 1 tablet (1,000 mg total) by mouth 2 (two) times a day with meals. 180 tablet 3    sertraline (ZOLOFT) 50 MG tablet [SERTRALINE (ZOLOFT) 50 MG TABLET] TAKE 1 TABLET DAILY FOR DEPRESSION // 1 HNUB NOJ 1 LUB PAB CHASE NYUAB SIAB 30 tablet 6     No current facility-administered medications for this visit.           Impression:  -Symptomatic right ICA stenosis resulting in right side ischemic stroke with ongoing dizziness    Discussed with patient extensively regarding description of stenting procedure, benefits and risks with the use of [employer] phone .      Plan:  -Plan for right ICA stenting, Dr. Marie will call patient later today to confirm patient final decision        Kerry Wiley MD   ESN Fellow  Pager: [phone]        Again, thank you for allowing me to participate in the care of your patient.      Sincerely,    Carlton Marie MD

## 2024-07-18 ENCOUNTER — APPOINTMENT (OUTPATIENT)
Dept: INTERPRETER SERVICES | Facility: CLINIC | Age: 39
End: 2024-07-18
Payer: MEDICARE

## 2024-07-19 ENCOUNTER — TELEPHONE (OUTPATIENT)
Dept: RADIOLOGY | Facility: CLINIC | Age: 39
End: 2024-07-19
Payer: MEDICARE

## 2024-07-19 ENCOUNTER — TELEPHONE (OUTPATIENT)
Dept: NEUROSURGERY | Facility: CLINIC | Age: 39
End: 2024-07-19
Payer: MEDICARE

## 2024-07-19 ENCOUNTER — APPOINTMENT (OUTPATIENT)
Dept: INTERPRETER SERVICES | Facility: CLINIC | Age: 39
End: 2024-07-19
Payer: MEDICARE

## 2024-07-19 NOTE — TELEPHONE ENCOUNTER
Patients friend called wanting to schedule a appointment with neurosurgery. I let her know I would pass on information to RN CC.      Connie Otero on 7/19/2024 at 1:32 PM

## 2024-07-19 NOTE — TELEPHONE ENCOUNTER
After further review appt not needed with vascular at this time as patient is planning on stenting with neurosurgery. Appt on 7/22 has been cancelled. Patient and May aware.

## 2024-07-19 NOTE — TELEPHONE ENCOUNTER
"Received message from scheduling stating patients friend, Roxana Jiang, called to schedule an appointment with neurosurgery.     Spoke with May. States she is patients ARMS . Conferenced in the patient.     Patient does not recall a visit with Dr. Marie/Dr. Wiley on 7/16/24.     States patient had a appointment with Dr. Raymond scheduled on 7/22 which was canceled - they were told it was canceled because patient was going to see NSG and that they would get a call to schedule, and if they didn't hear anything by the end of the week to call.     Patient is feeling worried and anxious and wants to get things taken care of.     Dr. Wiley's 7/16/24 note states:\"Impression:  -Symptomatic right ICA stenosis resulting in right side ischemic stroke with ongoing dizziness     Discussed with patient extensively regarding description of stenting procedure, benefits and risks with the use of [employer] phone .     Plan:  -Plan for right ICA stenting, Dr. Marie will call patient later today to confirm patient final decision\"    Message sent to Dr. Marie and Dr. Wiley - will follow-up with May and patient.     Understanding verbalized and appreciative of the call.     Saundra Wade RN 7/19/2024 4:19 PM     "

## 2024-07-25 NOTE — PATIENT INSTRUCTIONS
2Scheduling will contact you to make arrangements for your carotid stent placement. You will need a pre-op physical within 30 days of your procedure. You will stay overnight at the hospital for observation following your procedure. Do not eat for 8 hours prior to arrival time. You may drink clear liquids (includes water, Jell-O, clear broth, apple juice or any non-carbonated beverage that you can see through) until 2 hours prior to arrival time. You may take your medications with a sip of water. You will check in at 3C, Surgery Check-In, on the third floor of the [facility] 2 hours prior to your procedure. You will receive written instructions via mail after your procedure has been scheduled.      If you have questions regarding your procedure, please contact me at [phone], option 1.    If you need to cancel, reschedule or have procedure scheduling related questions, please call Neuroendovascular IR Procedure Scheduling at [phone].    Thank you,  Silvia Salgado RN & Saundra Wade RN, CNRN, SCRN  Stroke & Endovascular Care Coordinator

## 2024-07-25 NOTE — TELEPHONE ENCOUNTER
Dr. Marie spoke with patient. Plan is for carotid stnet under general anesthesia. 81 mg aspirin daily and Brilinta 60 mg twice daily.     Saundra Wade RN 7/25/2024 10:08 AM

## 2024-07-30 ENCOUNTER — TELEPHONE (OUTPATIENT)
Dept: RADIOLOGY | Facility: CLINIC | Age: 39
End: 2024-07-30
Payer: MEDICARE

## 2024-07-30 NOTE — TELEPHONE ENCOUNTER
Patients friend May called  in regards to IR procedure with Dr. Marie    Procedure Date: 9/19/2024    Arrival: 0600    Loction: [facility]    Anesthesia Type: General Anesthesia    Notes:         Connie Otero on 7/30/2024 at 1:41 PM

## 2024-08-06 NOTE — TELEPHONE ENCOUNTER
I called patient friend May in regards to IR procedure with Dr. Marie    Procedure Date: 9/5/2024    Arrival: 0630    Loction: [facility]    Pre op: PCP    Anesthesia Type: General Anesthesia    Notes:   - Rescheduled         Connie Otero on 8/6/2024 at 12:55 PM

## 2024-08-09 ENCOUNTER — PATIENT OUTREACH (OUTPATIENT)
Dept: NEUROSURGERY | Facility: CLINIC | Age: 39
End: 2024-08-09
Payer: MEDICARE

## 2024-08-09 NOTE — PROGRESS NOTES
Used  to try to reach patient but none of his 3 numbers were able to be dialed. RN mailed instructions and included translation via [employer] to [employer] with our contact information and will await patient to call us to go over instructions.   Silvia Salgado RN 8/9/2024 10:21 AM

## 2024-08-09 NOTE — PATIENT INSTRUCTIONS
Mr. Muñoz,   We tried to call you to review instructions but were unable to reach you. Please call us at [phone] with any questions regarding your procedure.   Sincerely,   YVETTE Vega    You are scheduled for a cerebral angiogram with Stent placement, under general anesthesia, with Dr. Marie on 9/5/24. Arrival Time: 6:00am. Procedure Time: 8:00am.    Please follow these instructions:    * You will need a pre-op physical within 30 days prior to your procedure. You may do this with your primary care provider or with the Pre-Operative Assessment Center (PAC), located at [facility] at [address] [place] [address]. The PAC phone number is [phone].    * Continue taking 81 mg aspirin and begin taking Brilinta 60 mg twice daily  on 8/31/24. Please stop taking Plavix on 8/30/24. You will remain on these medications for your procedure.     * Contact your endocrinologist for directions on insulin administration for procedure prep. Do NOT take Metformin the day of the procedure or for two days following the procedure; resume taking Metformin on 9/8/24. You may take your other medications with a sip of water the morning of the procedure.    * Check in at the Surgery Admission Unit (3C), on the third floor, at the [facility]. The address is [address] [place] [address]. The phone number is [phone].     * Nothing to eat for 8 hours prior to arrival time. You may drink clear liquids (includes water, Jell-O, clear broth, apple juice or any non-carbonated beverage that you can see through) up until 2 hours prior to arrival time.     * You may take your medications with a sip of water the morning of the procedure.     * You will stay overnight at the hospital for observation after the procedure. We aim to discharge you by 11:00 AM the following day. Please have your ride available by 10:00 AM.     PLEASE NOTE our COVID-19 visitors  policy: Adult surgical and procedural patients can have two visitors throughout the surgery process. The two visitors may include children of any age; please note, children cannot stay in the waiting room alone.    All discharge instructions will be given to the  or volunteer. Documentation for the post-operative plan will be given to the patient and . Patients are required to have someone to stay with them for 24 hours after their procedure.    If you have questions regarding your procedure, please contact me at [phone], option 1.    If you need to cancel, reschedule or have procedure scheduling related questions, please call Neuroendovascular IR Procedure Scheduling at [phone].    Thank you,  Saundra Wade, RN, CNRN, SCRN  Silvia Salgado, RN, BSN  Stroke & Neuroendovascular Care Coordinator    Translated using [employer] Translate:    Mr. Muñoz,   [facility] hu peter koj saib xyuas cov cira qhia tab sis tsis tau mus cuag koj. Thov hu peter peb [facility] [phone] nrog frances cira nug txog koj cov txheej txheem.   Ua Silvia Wesley, YVETTE    Koabbi teem peter ib cerebral angiogram nrog Stent placement, jovan funes mary lou anesthesia, nrog Dr. Frank green 9/5/24. Tuaj txog lub sij hawm: 6:00 am. Txheej Txheem: 8:00 am.    Thov ua raws li cov cira qhia nram no:  * Koj yuav tau ua ntej lub cev ua ntej 30 hnub ua ntej koj txoj mary lou yuav ua ntej koj cov txheej txheem. Koj yuav ua tau qhov no nrog koj lub chaw muab mary lou pab nicolasa mob los yog nrog peter qhov mary lou soj ntsuam Pre-Operative Assessment Center (PAC), [facility] [address] [address]. Tus PAC xov tooj yog [phone].    * Noj 81 mg aspirin thiab pib noj Brilinta 60 mg ob zaug txhua hnub peter 8/31/24. Thov txhob noj Plavix peter 8/30/24. Kieran yuen nysahra twj ywm peter cov tshuaj no peter koj txoj mary lou ua.     * Ismael peter koj tus endocrinologist peter cov cira qhia peter insulin administration peter txoj mary lou ua ntej. TSIS TXHOB siv  Metformin hnub ntawm txoj mary lou los yog ob hnub lalita qab cov txheej txheem; Resume noj Metformin peter 9/8/24. Frances zaum koj yuav noj lwm cov tshuaj uas muaj dej sip ntawm cov dej sawv ntxov ntxov.    * Xyuas hauv chav tsev phais (3C), nyob hauv pem teb thib peb, hauv lub tsev kawm ntawv ntawm [facility]. Qhov chaw nyob yog [address] [place] [address]. Tus xov tooj yog [phone].     * Tsis muaj dab tsi noj li 8 teev ua ntej lub sij hawm tuaj txog. Frances zaum koj yuav haus da ntshiab (muaj xws li da, Jell-O, ntshiab broth, kua txiv apple los yog frances dej uas tsis yog-carbonated beverage uas koj yuav pom los ntawm) mus txog peter 2 teev ua ntej lub sij hawm tuaj txog.     * Frances zaum koj yuav tau noj koj cov tshuaj mus peter ib cov da uas muaj da thaum sawv ntxov ntxov.     * Koj yuav rov pw thaum hmo ntuj lalita tsev nicolasa mob loj. Peb taw kom koj pom koj los ntawm 11:00 AM hnub lalita qab ntawd. Thov koj caij muaj caij nyoog 10:00 AM.    THOV NCO NTSOOV PEB COVID-19 qhua txoj kat: Cov neeg laus surgical thiab cov neeg mob yuav muaj ob qhua thoob plaws hauv txoj mary lou phais. Ob tug qhua yuav muaj xws li cov me nyuam hnub nyoog li ronel; Thov nco ntsoov, cov me nyuam tsis nyob hauv chav tos ib leeg kheej.    Tag nrho cov cira qhia yuav muab peter tus neeg tsav tsheb lossis pab dag zog dawb. Cov ntaub ntawv peter lub edwige phiaj lalita qab operative yuav muab peter tus neeg mob thiab tus neeg tsav tsheb. Cov neeg mob yuav tsum tau muaj ib tug neeg nyob nrog lawv li 24 teev lalita qab lawv txoj mary lou.    Yog koj muaj cira nug txog koj cov txheej txheem, thov hu peter kuv ntawm [phone], xaiv 1.    Yog hais tias koj yuav tsum tau cancel, reschedule los yog muaj txheej txheem teem tseg, thov hu peter Neuroendovascular IR Procedure Teem ntawm [phone].     jerrica Wade, RN, CNRN, SCRN  Silvia Salgado, RN, BSN

## 2024-08-26 ENCOUNTER — TELEPHONE (OUTPATIENT)
Dept: NEUROSURGERY | Facility: CLINIC | Age: 39
End: 2024-08-26
Payer: MEDICARE

## 2024-08-26 DIAGNOSIS — I65.21 STENOSIS OF RIGHT CAROTID ARTERY: Primary | ICD-10-CM

## 2024-08-26 NOTE — TELEPHONE ENCOUNTER
Called patient to go over instructions and let him know he needs to stop his Trulicity at least 7 days prior to surgery. Spoke with patient via [employer] . He did not yet schedule a pre-op physical, RN gave him the number for the PAC clinic and he said he would call over there. He did receive instructions in the mail. He took his last dose of Trulicity today and verbalized understanding that he should not take it again prior to the procedure. RN confirmed to send in Brilinta to [facility]. RN let patient know to stop Plavix on 8/30/24 and start Brilinta on 8/31/24. Patient verbalized understanding. He has no further questions at this time.   Silvia Salgado RN 8/26/2024 4:41 PM

## 2024-08-28 ENCOUNTER — TELEPHONE (OUTPATIENT)
Dept: SURGERY | Facility: CLINIC | Age: 39
End: 2024-08-28
Payer: MEDICARE

## 2024-08-28 LAB
ABO/RH(D): NORMAL
ANTIBODY SCREEN: NEGATIVE
SPECIMEN EXPIRATION DATE: NORMAL

## 2024-08-28 NOTE — TELEPHONE ENCOUNTER
Patients friend May called back and agreed to appointment for 8/29 with PAC.      Connie Otero on 8/28/2024 at 9:24 AM

## 2024-08-28 NOTE — PROGRESS NOTES
Writer called pt using a [employer] . Asked when pt takes Trulicity; pt states normally on Mondays but has not taken it for about 2 weeks. Writer asked pt to continue holding until after procedure on 9/5/24. Pt expressed understanding. Writer also went over ASA, Brilanta and Plavix information with pt using the .

## 2024-08-28 NOTE — TELEPHONE ENCOUNTER
I called patient friend May in regards to setting patient up for Pre op with PAC.  I was able to leave a voicemail and call back number.    Connie Otero on 8/28/2024 at 8:58 AM

## 2024-08-28 NOTE — TELEPHONE ENCOUNTER
FUTURE VISIT INFORMATION      SURGERY INFORMATION:  Date: 24  Location: uu or  Surgeon:  Carlton Marie MD   Anesthesia Type:  general  Procedure: ANESTHESIA, IN NON-OPERATING ROOM SETTING Stent Placement @0800   Consult: virtual visit 24    RECORDS REQUESTED FROM:       Primary Care Provider: Mark Crockett MD - Suha    Pertinent Medical History: hypertension, stenosis of right carotid artery     Most recent EKG+ Tracin24    Most recent ECHO: 24

## 2024-08-28 NOTE — TELEPHONE ENCOUNTER
Called May per conversation with Connie that they would like to speak with Dr. Marie again before procedure on 9/5/24. Patient would like to speak with Dr. Marie with his family in attendance. RN sent message to scheduling to add patient on to speak with Dr. Marie on 9/3/24 at 0915. No further questions at this time.   Silvia Salgado RN 8/28/2024 9:46 AM

## 2024-08-29 ENCOUNTER — APPOINTMENT (OUTPATIENT)
Dept: LAB | Facility: CLINIC | Age: 39
End: 2024-08-29
Payer: MEDICARE

## 2024-08-29 ENCOUNTER — OFFICE VISIT (OUTPATIENT)
Dept: SURGERY | Facility: CLINIC | Age: 39
End: 2024-08-29
Payer: MEDICARE

## 2024-08-29 ENCOUNTER — LAB (OUTPATIENT)
Dept: LAB | Facility: CLINIC | Age: 39
End: 2024-08-29
Payer: MEDICARE

## 2024-08-29 ENCOUNTER — PRE VISIT (OUTPATIENT)
Dept: SURGERY | Facility: CLINIC | Age: 39
End: 2024-08-29

## 2024-08-29 ENCOUNTER — ANESTHESIA EVENT (OUTPATIENT)
Dept: SURGERY | Facility: CLINIC | Age: 39
End: 2024-08-29
Payer: MEDICARE

## 2024-08-29 VITALS
RESPIRATION RATE: 16 BRPM | HEIGHT: 62 IN | SYSTOLIC BLOOD PRESSURE: 120 MMHG | BODY MASS INDEX: 26.76 KG/M2 | OXYGEN SATURATION: 100 % | WEIGHT: 145.4 LBS | HEART RATE: 86 BPM | DIASTOLIC BLOOD PRESSURE: 85 MMHG | TEMPERATURE: 97.7 F

## 2024-08-29 DIAGNOSIS — I65.21 STENOSIS OF RIGHT CAROTID ARTERY: ICD-10-CM

## 2024-08-29 DIAGNOSIS — Z01.818 PREOP EXAMINATION: Primary | ICD-10-CM

## 2024-08-29 LAB
ANION GAP SERPL CALCULATED.3IONS-SCNC: 9 MMOL/L (ref 7–15)
BUN SERPL-MCNC: 11.4 MG/DL (ref 6–20)
CALCIUM SERPL-MCNC: 9.7 MG/DL (ref 8.8–10.4)
CHLORIDE SERPL-SCNC: 109 MMOL/L (ref 98–107)
CREAT SERPL-MCNC: 1.09 MG/DL (ref 0.67–1.17)
EGFRCR SERPLBLD CKD-EPI 2021: 87 ML/MIN/1.73M2
ERYTHROCYTE [DISTWIDTH] IN BLOOD BY AUTOMATED COUNT: 11.9 % (ref 10–15)
GLUCOSE SERPL-MCNC: 99 MG/DL (ref 70–99)
HCO3 SERPL-SCNC: 27 MMOL/L (ref 22–29)
HCT VFR BLD AUTO: 35.9 % (ref 40–53)
HGB BLD-MCNC: 12.5 G/DL (ref 13.3–17.7)
MCH RBC QN AUTO: 30.1 PG (ref 26.5–33)
MCHC RBC AUTO-ENTMCNC: 34.8 G/DL (ref 31.5–36.5)
MCV RBC AUTO: 87 FL (ref 78–100)
PLATELET # BLD AUTO: 296 10E3/UL (ref 150–450)
POTASSIUM SERPL-SCNC: 4.4 MMOL/L (ref 3.4–5.3)
RBC # BLD AUTO: 4.15 10E6/UL (ref 4.4–5.9)
SODIUM SERPL-SCNC: 145 MMOL/L (ref 135–145)
WBC # BLD AUTO: 9 10E3/UL (ref 4–11)

## 2024-08-29 PROCEDURE — 99204 OFFICE O/P NEW MOD 45 MIN: CPT | Performed by: NURSE PRACTITIONER

## 2024-08-29 PROCEDURE — 85027 COMPLETE CBC AUTOMATED: CPT | Performed by: PATHOLOGY

## 2024-08-29 PROCEDURE — 86900 BLOOD TYPING SEROLOGIC ABO: CPT

## 2024-08-29 PROCEDURE — 36415 COLL VENOUS BLD VENIPUNCTURE: CPT | Performed by: PATHOLOGY

## 2024-08-29 PROCEDURE — 80048 BASIC METABOLIC PNL TOTAL CA: CPT | Performed by: PATHOLOGY

## 2024-08-29 ASSESSMENT — ENCOUNTER SYMPTOMS: SEIZURES: 0

## 2024-08-29 ASSESSMENT — PAIN SCALES - GENERAL: PAINLEVEL: NO PAIN (0)

## 2024-08-29 ASSESSMENT — LIFESTYLE VARIABLES: TOBACCO_USE: 0

## 2024-08-29 NOTE — H&P
Pre-Operative H & P     CC:  Preoperative exam to assess for increased cardiopulmonary risk while undergoing surgery and anesthesia.    Date of Encounter: 8/29/2024  Primary Care Physician:  Mark Crockett     Reason for visit:   Encounter Diagnoses   Name Primary?    Preop examination Yes    Stenosis of right carotid artery        MORIAH Muñoz is a 39 year old male who presents for pre-operative H & P in preparation for  Procedure Information       Case: [id number] Date/Time: 09/05/24 0800    Procedure: ANESTHESIA, IN NON-OPERATING ROOM SETTING Stent Placement @0800 (Update)    Anesthesia type: General    Diagnosis: Stenosis of right carotid artery [I65.21]    Pre-op diagnosis: Stenosis of right carotid artery [I65.21]    Location: [facility] / [facility]    Providers: GENERIC ANESTHESIA PROVIDER            The patient presents to the PAC in person today in preparation for the above scheduled procedure with comorbid conditions including CVA (7/8/2024), HTN, hypertriglyceridemia, DM on insulin, tinea versicolor, lumbar radiculopathy and anxiety.    The  patient was seen in the in the [facility] on 7/16/2024 with Dr. Marie in follow up for Symptomatic R ICA stenosis resulting in right side ischemic stroke (7/8/2024) with ongoing dizziness.  Dr. Pierre counseled the patient of the findings and treatment options.  The patient has now been scheduled for the procedure as listed above.      History is obtained from the patient via in-person  >>Roxana Jiang ([employer] worker) and chart review    Hx of abnormal bleeding or anti-platelet use: ASA and plavix      Past Medical History  Past Medical History:   Diagnosis Date    Anxiety     Cerebrovascular accident (CVA) involving anterior circulation of right side (H)     hemorrhagic due to right ica dissection per clinic notes    Hypertension     Hypertriglyceridemia     Lumbar radiculopathy     Tinea versicolor     Uncontrolled diabetes mellitus     on  insulin    Vitamin D deficiency        Past Surgical History  Past Surgical History:   Procedure Laterality Date    IR MISCELLANEOUS PROCEDURE  8/24/2013    IR MISCELLANEOUS PROCEDURE  8/24/2013    IR MISCELLANEOUS PROCEDURE  8/24/2013    IR MISCELLANEOUS PROCEDURE  8/26/2013    IR MISCELLANEOUS PROCEDURE  8/26/2013    IR MISCELLANEOUS PROCEDURE  9/3/2013       Prior to Admission Medications  Current Outpatient Medications   Medication Sig Dispense Refill    aspirin 81 MG EC tablet [ASPIRIN 81 MG EC TABLET] Take 1 tablet (81 mg total) by mouth daily. To prevent stroke (Patient taking differently: Take 81 mg by mouth every morning.) 90 tablet 6    atorvastatin (LIPITOR) 80 MG tablet Take 80 mg by mouth every morning.      clopidogrel (PLAVIX) 75 MG tablet Take 1 tablet (75 mg) by mouth daily for 90 days (Patient taking differently: Take 75 mg by mouth every morning.) 90 tablet 0    dulaglutide (TRULICITY) 1.5 mg/0.5 mL PnIj [DULAGLUTIDE (TRULICITY) 1.5 MG/0.5 ML PNIJ] Inject 1.5 mg under the skin every 7 days. For blood sugars (Patient taking differently: Inject 1.5 mg subcutaneously every 7 days. Mondays) 6 mL 6    lisinopril (ZESTRIL) 20 MG tablet Take 20 mg by mouth every morning.      ticagrelor (BRILINTA) 60 MG tablet Please begin taking 1 tab two times daily 5 days prior to procedure. (Patient not taking: Reported on 8/29/2024) 60 tablet 3       Allergies  No Known Allergies    Social History  Social History     Socioeconomic History    Marital status: Single     Spouse name: Not on file    Number of children: Not on file    Years of education: Not on file    Highest education level: Not on file   Occupational History    Not on file   Tobacco Use    Smoking status: Never    Smokeless tobacco: Never   Substance and Sexual Activity    Alcohol use: No    Drug use: No    Sexual activity: Not on file   Other Topics Concern    Not on file   Social History Narrative    Not on file     Social Determinants of Health      Financial Resource Strain: Low Risk  (7/11/2024)    Received from [email or web address] [facility]    Financial Resource Strain     Difficulty of Paying Living Expenses: 3     Difficulty of Paying Living Expenses: Not on file   Food Insecurity: No Food Insecurity (7/11/2024)    Received from [facility]    Food Insecurity     Worried About Running Out of Food in the Last Year: 1   Transportation Needs: No Transportation Needs (7/11/2024)    Received from [facility]    Transportation Needs     Lack of Transportation (Medical): 1   Physical Activity: Not on file   Stress: Not on file   Social Connections: Socially Integrated (7/11/2024)    Received from [facility]    Social Connections     Frequency of Communication with Friends and Family: 0   Interpersonal Safety: Not on file   Housing Stability: Low Risk  (7/11/2024)    Received from [facility]    Housing Stability     Unable to Pay for Housing in the Last Year: 1       Family History  Family History   Problem Relation Age of Onset    No Known Problems Mother     No Known Problems Father     No Known Problems Sister     No Known Problems Brother     No Known Problems Maternal Grandmother     No Known Problems Maternal Grandfather     No Known Problems Paternal Grandmother     No Known Problems Paternal Grandfather        Review of Systems  The complete review of systems is negative other than noted in the HPI or here.   Anesthesia Evaluation   Pt has had prior anesthetic. Type: General.    No history of anesthetic complications       ROS/MED HX  ENT/Pulmonary:     (+)     PATRICIA risk factors,  hypertension,                              (-) tobacco use and asthma   Neurologic: Comment: Right carotid stenosis presents with headache and dizziness found to have acute ischemic stroke of right caudate and internal capsule and  "temporal lobe .    Lumbar radiculopathy    (+)          CVA (Ongoing headache and dizziness), date: 2013 and 7/2024,                  (-) no seizures and migraines   Cardiovascular: Comment: Denies cardiac symptoms including chest pain, SOB, palpitations, syncope, CHAND, orthopnea, or PND.             (+) Dyslipidemia hypertension- -   -  - -   Taking blood thinners (DAPT)                              Previous cardiac testing   Echo: Date: Results:    Stress Test:  Date: Results:    ECG Reviewed:  Date: 7/8/2024 Results:  Sinus rhythm   Rightward axis   Borderline ECG   When compared with ECG of 24-AUG-2013 16:57,   No significant change was found   Confirmed by SEE ED PROVIDER NOTE FOR, ECG INTERPRETATION ([id number]),  RIP ROBISON ([id number]) on 7/9/2024 8:36:03 PM     Cath:  Date: Results:      METS/Exercise Tolerance: >4 METS Comment: Prior to CVA in July 2024, was working out at the gym on a regular basis.  Continues to walk but has not returned to the gym since CVA in July 2024.   Hematologic:    (-) history of blood clots, anemia and history of blood transfusion   Musculoskeletal:       GI/Hepatic:    (-) GERD and liver disease   Renal/Genitourinary:    (-) renal disease   Endo:     (+)  type II DM, Last HgA1c: 8.1, date: 7/8/2024, Not using insulin, - not using insulin pump.             (-) thyroid disease, chronic steroid usage and obesity   Psychiatric/Substance Use:     (+) psychiatric history (remission)     (-) alcohol abuse history and chronic opioid use history   Infectious Disease:  - neg infectious disease ROS     Malignancy:  - neg malignancy ROS     Other:  - neg other ROS          /85 (BP Location: Right arm, Patient Position: Sitting, Cuff Size: Adult Regular)   Pulse 86   Temp 97.7  F (36.5  C) (Oral)   Resp 16   Ht 1.575 m (5' 2\")   Wt 66 kg (145 lb 6.4 oz)   SpO2 100%   BMI 26.59 kg/m      Physical Exam   Constitutional: Awake, alert, cooperative, no apparent distress, and appears " stated age.  Eyes: Pupils equal, round and reactive to light, extra ocular muscles intact, sclera clear, conjunctiva normal.  HENT: Normocephalic, oral pharynx with moist mucus membranes, good dentition. No goiter appreciated.   Respiratory: Clear to auscultation bilaterally, no crackles or wheezing.  Cardiovascular: Regular rate and rhythm, normal S1 and S2, and no murmur noted.  Carotids +2, no bruits. No edema. Palpable pulses to radial  DP and PT arteries.   GI: Normal bowel sounds, soft, non-distended, non-tender, no masses palpated, no hepatosplenomegaly.    Lymph/Hematologic: No cervical lymphadenopathy and no supraclavicular lymphadenopathy.  Skin: Warm and dry.  Evidence of tinea versicolor  Musculoskeletal: Full ROM of neck. There is no redness, warmth, or swelling of the joints. Gross motor strength is normal.    Neurologic: Awake, alert, oriented to name, place and time. Cranial nerves II-XII are grossly intact. Gait is normal.   Neuropsychiatric: Calm, cooperative. Normal affect.     Prior Labs/Diagnostic Studies   All labs and imaging personally reviewed    Latest Reference Range & Units 07/09/24 07:06   Sodium 135 - 145 mmol/L 140   Potassium 3.4 - 5.3 mmol/L 4.1   Chloride 98 - 107 mmol/L 100   Carbon Dioxide (CO2) 22 - 29 mmol/L 31 (H)   Urea Nitrogen 6.0 - 20.0 mg/dL 14.7   Creatinine 0.67 - 1.17 mg/dL 0.99   GFR Estimate >60 mL/min/1.73m2 >90   Calcium 8.6 - 10.0 mg/dL 9.6   Anion Gap 7 - 15 mmol/L 9   Albumin 3.5 - 5.2 g/dL 4.3   Protein Total 6.4 - 8.3 g/dL 7.6   Alkaline Phosphatase 40 - 150 U/L 62   ALT 0 - 70 U/L 24   AST 0 - 45 U/L 15   Bilirubin Total <=1.2 mg/dL 0.6   Glucose 70 - 99 mg/dL 144 (H)   Troponin T, High Sensitivity <=22 ng/L 6   WBC 4.0 - 11.0 10e3/uL 8.8   Hemoglobin 13.3 - 17.7 g/dL 14.0   Hematocrit 40.0 - 53.0 % 40.5   Platelet Count 150 - 450 10e3/uL 298   RBC Count 4.40 - 5.90 10e6/uL 4.68   MCV 78 - 100 fL 87   MCH 26.5 - 33.0 pg 29.9   MCHC 31.5 - 36.5 g/dL 34.6   RDW  10.0 - 15.0 % 11.0   % Neutrophils % 58   % Lymphocytes % 30   % Monocytes % 7   % Eosinophils % 5   % Basophils % 1   Absolute Basophils 0.0 - 0.2 10e3/uL 0.1   Absolute Eosinophils 0.0 - 0.7 10e3/uL 0.4   Absolute Immature Granulocytes <=0.4 10e3/uL 0.0   Absolute Lymphocytes 0.8 - 5.3 10e3/uL 2.6   Absolute Monocytes 0.0 - 1.3 10e3/uL 0.6   % Immature Granulocytes % 0   Absolute Neutrophils 1.6 - 8.3 10e3/uL 5.1   Absolute NRBCs 10e3/uL 0.0   NRBCs per 100 WBC <1 /100 0   (H): Data is abnormally high    PROCEDURES  Complete Portable Echo Adult. Complete Portable Bubble Echo Adult.  7/9/2024  ________________________________________________________  Interpretation Summary     Left ventricular size, wall motion and function are normal. The ejection  fraction is 60-65%.  Normal right ventricle size and systolic function.  A contrast injection (Bubble Study) was performed that was negative for flow  across the interatrial septum.  No hemodynamically significant valvular abnormalities on 2D or color flow  imaging.       US CAROTID BILATERAL  LOCATION: [facility]  DATE: 7/9/2024     INDICATION: High-grade critical narrowing of the right ICA seen on recent CTA.                                                            IMPRESSION:  1.  Right carotid: Very low velocity waveforms throughout the ICA consistent with patient's known high-grade/critical ICA stenosis as seen on CTA earlier today.  2.  Left carotid: Mild plaque formation, velocities consistent with less than 50% stenosis in the left internal carotid artery.  3.  Flow within the vertebral arteries is antegrade.    CTA HEAD NECK W CONTRAST  LOCATION: [facility]  DATE: 7/8/2024     INDICATION: Headache, dizziness, HTN, history of prior stroke  COMPARISON: Brain MRI, brain and neck MRA 08/26/2017, head and neck CT angiogram 08/24/2013  HEAD CTA:  1.  High-grade narrowing of the distal extracranial internal carotid  artery, worsened since prior head CTA 08/24/2013 and MRA 08/26/2017.  2.  Redemonstration of moderate narrowing of the M1 segment of the right middle cerebral artery, similar to the prior exam.  3.  Multifocal moderate narrowing of the A2 and A3 segments of the anterior cerebral arteries. Mild to moderate narrowing of the supraclinoid left internal carotid artery.     NECK CTA:  1.  High-grade critical narrowing of the right internal carotid artery starting at the origin to the skull base, worsened since previous MRA 08/26/2017.  2.  No hemodynamically significant narrowing of the left internal carotid artery based on the NASCET criteria.  3.  The vertebral arteries are patent throughout their course in the neck.     EKG/ stress test - if available please see in ROS above   Echo result w/o MOPS: Interpretation Summary Left ventricular size, wall motion and function are normal. The ejectionfraction is 60-65%.Normal right ventricle size and systolic function.A contrast injection (Bubble Study) was performed that was negative for flowacross the interatrial septum.No hemodynamically significant valvular abnormalities on 2D or color flowimaging.           No data to display                  The patient's records and results personally reviewed by this provider.     Outside records reviewed from: Care Everywhere    LAB/DIAGNOSTIC STUDIES TODAY:     Latest Reference Range & Units 08/29/24 13:53   Sodium 135 - 145 mmol/L 145   Potassium 3.4 - 5.3 mmol/L 4.4   Chloride 98 - 107 mmol/L 109 (H)   Carbon Dioxide (CO2) 22 - 29 mmol/L 27   Urea Nitrogen 6.0 - 20.0 mg/dL 11.4   Creatinine 0.67 - 1.17 mg/dL 1.09   GFR Estimate >60 mL/min/1.73m2 87   Calcium 8.8 - 10.4 mg/dL 9.7   Anion Gap 7 - 15 mmol/L 9   Glucose 70 - 99 mg/dL 99   WBC 4.0 - 11.0 10e3/uL 9.0   Hemoglobin 13.3 - 17.7 g/dL 12.5 (L)   Hematocrit 40.0 - 53.0 % 35.9 (L)   Platelet Count 150 - 450 10e3/uL 296   RBC Count 4.40 - 5.90 10e6/uL 4.15 (L)   MCV 78 - 100 fL  "87   MCH 26.5 - 33.0 pg 30.1   MCHC 31.5 - 36.5 g/dL 34.8   RDW 10.0 - 15.0 % 11.9   (H): Data is abnormally high  (L): Data is abnormally low    Assessment    Sanchez Muñoz is a 39 year old male seen as a PAC referral for risk assessment and optimization for anesthesia.    Plan/Recommendations  Pt will be optimized for the proposed procedure.  See below for details on the assessment, risk, and preoperative recommendations    NEUROLOGY  -  Right carotid stenosis with symptoms of headache and dizziness found to have acute ischemic stroke of right caudate and internal capsule and temporal lobe7/8/2024.   ~ residual dizziness   ~ above procedure scheduled   ~ preoperative labs today    -Post Op delirium risk factors:  No risk identified    ENT  - No current airway concerns.  Will need to be reassessed day of surgery.  Mallampati: II  TM: > 3    CARDIAC  - No history of CAD and Afib    -  Denies cardiac symptoms.    - HTN, stable   ~ hold lisinopril DOS    - Dyslipidemia   ~ statin     - METS (Metabolic Equivalents)  Patient performs 4 or more METS exercise without symptoms             Total Score: 0      RCRI-Moderate risk: Class 3  6.6% complication rate             Total Score: 2    RCRI: High Risk Surgery    RCRI: Cerebrovascular Disease         PULMONARY  - PATRICIA Low Risk             Total Score: 2    PATRICIA: Hypertension    PATRICIA: Male      - Denies asthma or inhaler use    - Tobacco History    History   Smoking Status    Never   Smokeless Tobacco    Never       GI  - Denies h/o GERD    PONV Low Risk  Total Score: 1           1 AN PONV: Patient is not a current smoker        /RENAL  - Baseline Creatinine  see above     ENDOCRINE    - BMI: Estimated body mass index is 26.59 kg/m  as calculated from the following:    Height as of this encounter: 1.575 m (5' 2\").    Weight as of this encounter: 66 kg (145 lb 6.4 oz).  Overweight (BMI 25.0-29.9)    - Diabetes Mellitus, Type 2, non-insulin dependent.    ~ A1C 8.1   ~ Trulicity " on Mondays.  Hold 9/2/2024.     HEME  - VTE Low Risk 0.5%             Total Score: 2    VTE: Male      - Platelet dysfunction second to Aspirin (Shreya, many others), Clopidogrel (Plavix), and Ticegralor (Brillinta)  Per neurosurgery's instructions:   Continue taking 81 mg aspirin and begin taking Brilinta 60 mg twice daily  on 8/31/24. Please stop taking Plavix on 8/30/24. You will remain on these medications for your procedure.     - Denies a h/o anemia or previous blood transfusion      MSK  Patient is NOT Frail             Total Score: 1    Frailty: Increased exhaustion        PSYCH  - h/o depression now in remission     Different anesthesia methods/types have been discussed with the patient, but they are aware that the final plan will be decided by the assigned anesthesia provider on the date of service.    The patient is optimized for their procedure. AVS with information on surgery time/arrival time, meds and NPO status given by nursing staff. No further diagnostic testing indicated.      On the day of service:     Prep time: 15 minutes  Visit time: 20 minutes  Documentation time: 15 minutes  ------------------------------------------  Total time: 50 minutes      VIRAJ Sheffield CNP  Preoperative Assessment Center  [facility]  Phone: [phone]  Fax: [phone]

## 2024-09-01 RX ORDER — NALOXONE HYDROCHLORIDE 0.4 MG/ML
0.4 INJECTION, SOLUTION INTRAMUSCULAR; INTRAVENOUS; SUBCUTANEOUS
OUTPATIENT
Start: 2024-09-01

## 2024-09-01 RX ORDER — NALOXONE HYDROCHLORIDE 0.4 MG/ML
0.2 INJECTION, SOLUTION INTRAMUSCULAR; INTRAVENOUS; SUBCUTANEOUS
OUTPATIENT
Start: 2024-09-01

## 2024-09-01 RX ORDER — HEPARIN SODIUM 200 [USP'U]/100ML
1 INJECTION, SOLUTION INTRAVENOUS EVERY 5 MIN PRN
OUTPATIENT
Start: 2024-09-01

## 2024-09-01 RX ORDER — FLUMAZENIL 0.1 MG/ML
0.2 INJECTION, SOLUTION INTRAVENOUS
OUTPATIENT
Start: 2024-09-01

## 2024-09-01 RX ORDER — FENTANYL CITRATE 50 UG/ML
25-50 INJECTION, SOLUTION INTRAMUSCULAR; INTRAVENOUS EVERY 5 MIN PRN
OUTPATIENT
Start: 2024-09-01

## 2024-09-03 ASSESSMENT — LIFESTYLE VARIABLES: TOBACCO_USE: 0

## 2024-09-03 ASSESSMENT — ENCOUNTER SYMPTOMS: SEIZURES: 0

## 2024-09-03 NOTE — ANESTHESIA PREPROCEDURE EVALUATION
Anesthesia Pre-Procedure Evaluation    Patient: Sanchez Muñoz   MRN: [id number] : 1985        Procedure : Procedure(s):  ANESTHESIA, IN NON-OPERATING ROOM SETTING Stent Placement @0800          Past Medical History:   Diagnosis Date    Anxiety     Cerebrovascular accident (CVA) involving anterior circulation of right side (H)     hemorrhagic due to right ica dissection per clinic notes    Hypertension     Hypertriglyceridemia     Lumbar radiculopathy     Tinea versicolor     Uncontrolled diabetes mellitus     on insulin    Vitamin D deficiency       Past Surgical History:   Procedure Laterality Date    IR MISCELLANEOUS PROCEDURE  2013    IR MISCELLANEOUS PROCEDURE  2013    IR MISCELLANEOUS PROCEDURE  2013    IR MISCELLANEOUS PROCEDURE  2013    IR MISCELLANEOUS PROCEDURE  2013    IR MISCELLANEOUS PROCEDURE  9/3/2013      No Known Allergies   Social History     Tobacco Use    Smoking status: Never    Smokeless tobacco: Never   Substance Use Topics    Alcohol use: No      Wt Readings from Last 1 Encounters:   24 66 kg (145 lb 6.4 oz)        Anesthesia Evaluation   Pt has had prior anesthetic. Type: General.    No history of anesthetic complications       ROS/MED HX  ENT/Pulmonary:     (+)     PATRICIA risk factors,  hypertension,                              (-) tobacco use and asthma   Neurologic: Comment: Right carotid stenosis presents with headache and dizziness found to have acute ischemic stroke of right caudate and internal capsule and temporal lobe .    Feels like subjective weakness of hand and Leg on left. Says face okay    Lumbar radiculopathy    (+)          CVA (Ongoing headache and dizziness), date:  and 2024, with deficits,                 (-) no seizures and migraines   Cardiovascular: Comment: Denies cardiac symptoms including chest pain, SOB, palpitations, syncope, CHAND, orthopnea, or PND.             (+) Dyslipidemia hypertension- -   -  - -   Taking blood thinners  (DAPT)                              Previous cardiac testing   Echo: Date: 7/2024 Results:  Interpretation Summary     Left ventricular size, wall motion and function are normal. The ejection  fraction is 60-65%.  Normal right ventricle size and systolic function.  A contrast injection (Bubble Study) was performed that was negative for flow  across the interatrial septum.  No hemodynamically significant valvular abnormalities on 2D or color flow  imaging.    Stress Test:  Date: Results:    ECG Reviewed:  Date: 7/8/2024 Results:  Sinus rhythm   Rightward axis   Borderline ECG   When compared with ECG of 24-AUG-2013 16:57,   No significant change was found   Confirmed by SEE ED PROVIDER NOTE FOR, ECG INTERPRETATION ([id number]),  RIP ROBISON ([id number]) on 7/9/2024 8:36:03 PM     Cath:  Date: Results:      METS/Exercise Tolerance: >4 METS Comment: Prior to CVA in July 2024, was working out at the gym on a regular basis.  Continues to walk but has not returned to the gym since CVA in July 2024.   Hematologic:    (-) history of blood clots, anemia and history of blood transfusion   Musculoskeletal:       GI/Hepatic:    (-) GERD and liver disease   Renal/Genitourinary:    (-) renal disease   Endo:     (+)  type II DM, Last HgA1c: 8.1, date: 7/8/2024, Not using insulin, - not using insulin pump.             (-) thyroid disease, chronic steroid usage and obesity   Psychiatric/Substance Use:     (+) psychiatric history (remission)     (-) alcohol abuse history and chronic opioid use history   Infectious Disease:  - neg infectious disease ROS     Malignancy:  - neg malignancy ROS     Other:  - neg other ROS          Physical Exam    Airway        Mallampati: II   TM distance: > 3 FB   Neck ROM: full   Mouth opening: > 3 cm    Respiratory Devices and Support         Dental       (+) Removable bridges or other hardware      Cardiovascular          Rhythm and rate: normal     Pulmonary   pulmonary exam normal            Other  "findings: Gross 5/5 sterngth in UE and LE bilaterally by my exam. Face and smile symmetric    OUTSIDE LABS:  CBC:   Lab Results   Component Value Date    WBC 9.0 08/29/2024    WBC 8.8 07/09/2024    HGB 12.5 (L) 08/29/2024    HGB 14.0 07/09/2024    HCT 35.9 (L) 08/29/2024    HCT 40.5 07/09/2024     08/29/2024     07/09/2024     BMP:   Lab Results   Component Value Date     08/29/2024     07/09/2024    POTASSIUM 4.4 08/29/2024    POTASSIUM 4.1 07/09/2024    CHLORIDE 109 (H) 08/29/2024    CHLORIDE 100 07/09/2024    CO2 27 08/29/2024    CO2 31 (H) 07/09/2024    BUN 11.4 08/29/2024    BUN 14.7 07/09/2024    CR 1.09 08/29/2024    CR 0.99 07/09/2024    GLC 99 08/29/2024     (H) 07/10/2024     COAGS: No results found for: \"PTT\", \"INR\", \"FIBR\"  POC: No results found for: \"BGM\", \"HCG\", \"HCGS\"  HEPATIC:   Lab Results   Component Value Date    ALBUMIN 4.3 07/09/2024    PROTTOTAL 7.6 07/09/2024    ALT 24 07/09/2024    AST 15 07/09/2024    ALKPHOS 62 07/09/2024    BILITOTAL 0.6 07/09/2024     OTHER:   Lab Results   Component Value Date    A1C 8.1 (H) 07/08/2024    NATALIE 9.7 08/29/2024    TSH 2.62 07/08/2024    T4 1.01 07/08/2024    SED 22 (H) 07/10/2024       Anesthesia Plan    ASA Status:  3    NPO Status:  NPO Appropriate    Anesthesia Type: General.     - Airway: ETT   Induction: Intravenous.   Maintenance: Balanced.   Techniques and Equipment:     - Lines/Monitors: 2nd IV     - Blood: T&S     Consents    Anesthesia Plan(s) and associated risks, benefits, and realistic alternatives discussed. Questions answered and patient/representative(s) expressed understanding.     - Discussed: Risks, Benefits and Alternatives for BOTH SEDATION and the PROCEDURE were discussed     - Discussed with:  Patient,       - Specific Concerns: full code. risk of mace, stroke, sore throat oral dental damage, blood tx.     - Extended Intubation/Ventilatory Support Discussed: No.      - Patient is DNR/DNI " "Status: No     Use of blood products discussed: Yes.     - Discussed with: Patient.     - Consented: consented to blood products     Postoperative Care    Pain management: IV analgesics.   PONV prophylaxis: Ondansetron (or other 5HT-3)     Comments:               Rowdy Vyas MD    I have reviewed the pertinent notes and labs in the chart from the past 30 days and (re)examined the patient.  Any updates or changes from those notes are reflected in this note.              # DMII: A1C = 8.1 % (Ref range: <5.7 %) within past 6 months  # Overweight: Estimated body mass index is 26.59 kg/m  as calculated from the following:    Height as of 8/29/24: 1.575 m (5' 2\").    Weight as of 8/29/24: 66 kg (145 lb 6.4 oz).      "

## 2024-09-05 ENCOUNTER — APPOINTMENT (OUTPATIENT)
Dept: INTERVENTIONAL RADIOLOGY/VASCULAR | Facility: CLINIC | Age: 39
End: 2024-09-05
Attending: RADIOLOGY
Payer: MEDICARE

## 2024-09-05 ENCOUNTER — HOSPITAL ENCOUNTER (OUTPATIENT)
Facility: CLINIC | Age: 39
Discharge: HOME | End: 2024-09-05
Attending: RADIOLOGY | Admitting: RADIOLOGY
Payer: MEDICARE

## 2024-09-05 ENCOUNTER — ANESTHESIA (OUTPATIENT)
Dept: SURGERY | Facility: CLINIC | Age: 39
End: 2024-09-05
Payer: MEDICARE

## 2024-09-05 VITALS
BODY MASS INDEX: 26.45 KG/M2 | OXYGEN SATURATION: 100 % | TEMPERATURE: 98.4 F | SYSTOLIC BLOOD PRESSURE: 125 MMHG | HEIGHT: 62 IN | RESPIRATION RATE: 18 BRPM | WEIGHT: 143.74 LBS | DIASTOLIC BLOOD PRESSURE: 94 MMHG | HEART RATE: 75 BPM

## 2024-09-05 DIAGNOSIS — I61.9 STROKE DUE TO INTRACEREBRAL HEMORRHAGE (H): ICD-10-CM

## 2024-09-05 DIAGNOSIS — I65.21 STENOSIS OF RIGHT CAROTID ARTERY: ICD-10-CM

## 2024-09-05 DIAGNOSIS — Z86.73 HISTORY OF ISCHEMIC STROKE WITHOUT RESIDUAL DEFICITS: Chronic | ICD-10-CM

## 2024-09-05 DIAGNOSIS — I63.89 AC ISCH MULTI VASC TERRITORIES STROKE (H): Primary | ICD-10-CM

## 2024-09-05 LAB
ABO/RH(D): NORMAL
ANION GAP SERPL CALCULATED.3IONS-SCNC: 11 MMOL/L (ref 7–15)
ANTIBODY SCREEN: NEGATIVE
APTT PPP: 31 SECONDS (ref 22–38)
BUN SERPL-MCNC: 11.8 MG/DL (ref 6–20)
CALCIUM SERPL-MCNC: 9.1 MG/DL (ref 8.8–10.4)
CHLORIDE SERPL-SCNC: 107 MMOL/L (ref 98–107)
CREAT SERPL-MCNC: 1.01 MG/DL (ref 0.67–1.17)
EGFRCR SERPLBLD CKD-EPI 2021: >90 ML/MIN/1.73M2
ERYTHROCYTE [DISTWIDTH] IN BLOOD BY AUTOMATED COUNT: 12 % (ref 10–15)
GLUCOSE BLDC GLUCOMTR-MCNC: 116 MG/DL (ref 70–99)
GLUCOSE BLDC GLUCOMTR-MCNC: 128 MG/DL (ref 70–99)
GLUCOSE SERPL-MCNC: 129 MG/DL (ref 70–99)
HCO3 SERPL-SCNC: 24 MMOL/L (ref 22–29)
HCT VFR BLD AUTO: 36.4 % (ref 40–53)
HGB BLD-MCNC: 12.3 G/DL (ref 13.3–17.7)
HOLD SPECIMEN: NORMAL
INR PPP: 1.01 (ref 0.85–1.15)
MCH RBC QN AUTO: 30.1 PG (ref 26.5–33)
MCHC RBC AUTO-ENTMCNC: 33.8 G/DL (ref 31.5–36.5)
MCV RBC AUTO: 89 FL (ref 78–100)
PA AA BLD-ACNC: 378 ARU
PA ADP BLD-ACNC: 56 PRU
PLATELET # BLD AUTO: 292 10E3/UL (ref 150–450)
POTASSIUM SERPL-SCNC: 4 MMOL/L (ref 3.4–5.3)
RBC # BLD AUTO: 4.09 10E6/UL (ref 4.4–5.9)
SODIUM SERPL-SCNC: 142 MMOL/L (ref 135–145)
SPECIMEN EXPIRATION DATE: NORMAL
WBC # BLD AUTO: 9.6 10E3/UL (ref 4–11)

## 2024-09-05 PROCEDURE — 272N000506 HC NEEDLE CR6

## 2024-09-05 PROCEDURE — 86900 BLOOD TYPING SEROLOGIC ABO: CPT | Performed by: ANESTHESIOLOGY

## 2024-09-05 PROCEDURE — 85610 PROTHROMBIN TIME: CPT | Performed by: STUDENT IN AN ORGANIZED HEALTH CARE EDUCATION/TRAINING PROGRAM

## 2024-09-05 PROCEDURE — 36226 PLACE CATH VERTEBRAL ART: CPT | Mod: LT | Performed by: RADIOLOGY

## 2024-09-05 PROCEDURE — 99152 MOD SED SAME PHYS/QHP 5/>YRS: CPT | Mod: GC | Performed by: RADIOLOGY

## 2024-09-05 PROCEDURE — 250N000025 HC SEVOFLURANE, PER MIN

## 2024-09-05 PROCEDURE — 710N000012 HC RECOVERY PHASE 2, PER MINUTE

## 2024-09-05 PROCEDURE — 80048 BASIC METABOLIC PNL TOTAL CA: CPT | Performed by: STUDENT IN AN ORGANIZED HEALTH CARE EDUCATION/TRAINING PROGRAM

## 2024-09-05 PROCEDURE — 250N000009 HC RX 250: Performed by: RADIOLOGY

## 2024-09-05 PROCEDURE — 85027 COMPLETE CBC AUTOMATED: CPT | Performed by: STUDENT IN AN ORGANIZED HEALTH CARE EDUCATION/TRAINING PROGRAM

## 2024-09-05 PROCEDURE — 85576 BLOOD PLATELET AGGREGATION: CPT

## 2024-09-05 PROCEDURE — 258N000003 HC RX IP 258 OP 636: Performed by: ANESTHESIOLOGY

## 2024-09-05 PROCEDURE — 250N000011 HC RX IP 250 OP 636: Performed by: ANESTHESIOLOGY

## 2024-09-05 PROCEDURE — 272N000197 HC ACCESSORY CR6

## 2024-09-05 PROCEDURE — 250N000013 HC RX MED GY IP 250 OP 250 PS 637: Performed by: STUDENT IN AN ORGANIZED HEALTH CARE EDUCATION/TRAINING PROGRAM

## 2024-09-05 PROCEDURE — 76937 US GUIDE VASCULAR ACCESS: CPT

## 2024-09-05 PROCEDURE — 258N000003 HC RX IP 258 OP 636

## 2024-09-05 PROCEDURE — 272N000572 HC SHEATH CR9

## 2024-09-05 PROCEDURE — 36223 PLACE CATH CAROTID/INOM ART: CPT | Mod: 50

## 2024-09-05 PROCEDURE — 36226 PLACE CATH VERTEBRAL ART: CPT

## 2024-09-05 PROCEDURE — 36223 PLACE CATH CAROTID/INOM ART: CPT | Mod: 50 | Performed by: RADIOLOGY

## 2024-09-05 PROCEDURE — 36223 PLACE CATH CAROTID/INOM ART: CPT | Performed by: ANESTHESIOLOGY

## 2024-09-05 PROCEDURE — C1760 CLOSURE DEV, VASC: HCPCS

## 2024-09-05 PROCEDURE — 272N000566 HC SHEATH CR3

## 2024-09-05 PROCEDURE — 36415 COLL VENOUS BLD VENIPUNCTURE: CPT | Performed by: STUDENT IN AN ORGANIZED HEALTH CARE EDUCATION/TRAINING PROGRAM

## 2024-09-05 PROCEDURE — 85730 THROMBOPLASTIN TIME PARTIAL: CPT | Performed by: STUDENT IN AN ORGANIZED HEALTH CARE EDUCATION/TRAINING PROGRAM

## 2024-09-05 PROCEDURE — 76937 US GUIDE VASCULAR ACCESS: CPT | Mod: 26 | Performed by: RADIOLOGY

## 2024-09-05 PROCEDURE — 250N000009 HC RX 250: Performed by: ANESTHESIOLOGY

## 2024-09-05 PROCEDURE — 250N000011 HC RX IP 250 OP 636: Performed by: RADIOLOGY

## 2024-09-05 PROCEDURE — C1769 GUIDE WIRE: HCPCS

## 2024-09-05 PROCEDURE — 272N000116 HC CATH CR1

## 2024-09-05 PROCEDURE — 272N000192 HC ACCESSORY CR2

## 2024-09-05 PROCEDURE — 370N000017 HC ANESTHESIA TECHNICAL FEE, PER MIN

## 2024-09-05 PROCEDURE — 36223 PLACE CATH CAROTID/INOM ART: CPT

## 2024-09-05 PROCEDURE — 999N000141 HC STATISTIC PRE-PROCEDURE NURSING ASSESSMENT

## 2024-09-05 PROCEDURE — 710N000010 HC RECOVERY PHASE 1, LEVEL 2, PER MIN

## 2024-09-05 PROCEDURE — 255N000002 HC RX 255 OP 636: Performed by: RADIOLOGY

## 2024-09-05 PROCEDURE — 272N000122 HC CATH CR8

## 2024-09-05 RX ORDER — OXYCODONE HYDROCHLORIDE 5 MG/1
5 TABLET ORAL
Status: COMPLETED | OUTPATIENT
Start: 2024-09-05 | End: 2024-09-05

## 2024-09-05 RX ORDER — FENTANYL CITRATE 50 UG/ML
25 INJECTION, SOLUTION INTRAMUSCULAR; INTRAVENOUS EVERY 5 MIN PRN
Status: DISCONTINUED | OUTPATIENT
Start: 2024-09-05 | End: 2024-09-05 | Stop reason: HOSPADM

## 2024-09-05 RX ORDER — ONDANSETRON 2 MG/ML
INJECTION INTRAMUSCULAR; INTRAVENOUS PRN
Status: DISCONTINUED | OUTPATIENT
Start: 2024-09-05 | End: 2024-09-05

## 2024-09-05 RX ORDER — HYDROMORPHONE HYDROCHLORIDE 1 MG/ML
0.4 INJECTION, SOLUTION INTRAMUSCULAR; INTRAVENOUS; SUBCUTANEOUS EVERY 5 MIN PRN
Status: DISCONTINUED | OUTPATIENT
Start: 2024-09-05 | End: 2024-09-05 | Stop reason: HOSPADM

## 2024-09-05 RX ORDER — LIDOCAINE 40 MG/G
CREAM TOPICAL
Status: CANCELLED | OUTPATIENT
Start: 2024-09-05

## 2024-09-05 RX ORDER — FENTANYL CITRATE 50 UG/ML
INJECTION, SOLUTION INTRAMUSCULAR; INTRAVENOUS PRN
Status: DISCONTINUED | OUTPATIENT
Start: 2024-09-05 | End: 2024-09-05

## 2024-09-05 RX ORDER — HEPARIN SODIUM 1000 [USP'U]/ML
INJECTION, SOLUTION INTRAVENOUS; SUBCUTANEOUS PRN
Status: DISCONTINUED | OUTPATIENT
Start: 2024-09-05 | End: 2024-09-05

## 2024-09-05 RX ORDER — LIDOCAINE HYDROCHLORIDE 20 MG/ML
INJECTION, SOLUTION INFILTRATION; PERINEURAL PRN
Status: DISCONTINUED | OUTPATIENT
Start: 2024-09-05 | End: 2024-09-05

## 2024-09-05 RX ORDER — DEXAMETHASONE SODIUM PHOSPHATE 4 MG/ML
4 INJECTION, SOLUTION INTRA-ARTICULAR; INTRALESIONAL; INTRAMUSCULAR; INTRAVENOUS; SOFT TISSUE
Status: DISCONTINUED | OUTPATIENT
Start: 2024-09-05 | End: 2024-09-05 | Stop reason: HOSPADM

## 2024-09-05 RX ORDER — HALOPERIDOL 5 MG/ML
1 INJECTION INTRAMUSCULAR
Status: DISCONTINUED | OUTPATIENT
Start: 2024-09-05 | End: 2024-09-05 | Stop reason: HOSPADM

## 2024-09-05 RX ORDER — ACETAMINOPHEN 325 MG/1
975 TABLET ORAL ONCE
Status: COMPLETED | OUTPATIENT
Start: 2024-09-05 | End: 2024-09-05

## 2024-09-05 RX ORDER — LIDOCAINE HYDROCHLORIDE 10 MG/ML
1-30 INJECTION, SOLUTION EPIDURAL; INFILTRATION; INTRACAUDAL; PERINEURAL
Status: COMPLETED | OUTPATIENT
Start: 2024-09-05 | End: 2024-09-05

## 2024-09-05 RX ORDER — ONDANSETRON 4 MG/1
4 TABLET, ORALLY DISINTEGRATING ORAL EVERY 30 MIN PRN
Status: DISCONTINUED | OUTPATIENT
Start: 2024-09-05 | End: 2024-09-05 | Stop reason: HOSPADM

## 2024-09-05 RX ORDER — LIDOCAINE 40 MG/G
CREAM TOPICAL
Status: DISCONTINUED | OUTPATIENT
Start: 2024-09-05 | End: 2024-09-05 | Stop reason: HOSPADM

## 2024-09-05 RX ORDER — ONDANSETRON 2 MG/ML
4 INJECTION INTRAMUSCULAR; INTRAVENOUS EVERY 30 MIN PRN
Status: DISCONTINUED | OUTPATIENT
Start: 2024-09-05 | End: 2024-09-05 | Stop reason: HOSPADM

## 2024-09-05 RX ORDER — NALOXONE HYDROCHLORIDE 0.4 MG/ML
0.1 INJECTION, SOLUTION INTRAMUSCULAR; INTRAVENOUS; SUBCUTANEOUS
Status: DISCONTINUED | OUTPATIENT
Start: 2024-09-05 | End: 2024-09-05 | Stop reason: HOSPADM

## 2024-09-05 RX ORDER — OXYCODONE HYDROCHLORIDE 10 MG/1
10 TABLET ORAL
Status: DISCONTINUED | OUTPATIENT
Start: 2024-09-05 | End: 2024-09-05 | Stop reason: HOSPADM

## 2024-09-05 RX ORDER — SODIUM CHLORIDE, SODIUM LACTATE, POTASSIUM CHLORIDE, CALCIUM CHLORIDE 600; 310; 30; 20 MG/100ML; MG/100ML; MG/100ML; MG/100ML
INJECTION, SOLUTION INTRAVENOUS CONTINUOUS
Status: DISCONTINUED | OUTPATIENT
Start: 2024-09-05 | End: 2024-09-05 | Stop reason: HOSPADM

## 2024-09-05 RX ORDER — SODIUM CHLORIDE, SODIUM LACTATE, POTASSIUM CHLORIDE, CALCIUM CHLORIDE 600; 310; 30; 20 MG/100ML; MG/100ML; MG/100ML; MG/100ML
INJECTION, SOLUTION INTRAVENOUS CONTINUOUS PRN
Status: DISCONTINUED | OUTPATIENT
Start: 2024-09-05 | End: 2024-09-05

## 2024-09-05 RX ORDER — DIMENHYDRINATE 50 MG/ML
25 INJECTION, SOLUTION INTRAMUSCULAR; INTRAVENOUS
Status: DISCONTINUED | OUTPATIENT
Start: 2024-09-05 | End: 2024-09-05 | Stop reason: HOSPADM

## 2024-09-05 RX ORDER — HYDROMORPHONE HYDROCHLORIDE 1 MG/ML
0.2 INJECTION, SOLUTION INTRAMUSCULAR; INTRAVENOUS; SUBCUTANEOUS EVERY 5 MIN PRN
Status: DISCONTINUED | OUTPATIENT
Start: 2024-09-05 | End: 2024-09-05 | Stop reason: HOSPADM

## 2024-09-05 RX ORDER — HEPARIN SODIUM 200 [USP'U]/100ML
1 INJECTION, SOLUTION INTRAVENOUS EVERY 5 MIN PRN
Status: DISCONTINUED | OUTPATIENT
Start: 2024-09-05 | End: 2024-09-05 | Stop reason: HOSPADM

## 2024-09-05 RX ORDER — GLYCOPYRROLATE 0.2 MG/ML
INJECTION, SOLUTION INTRAMUSCULAR; INTRAVENOUS PRN
Status: DISCONTINUED | OUTPATIENT
Start: 2024-09-05 | End: 2024-09-05

## 2024-09-05 RX ORDER — IODIXANOL 320 MG/ML
150 INJECTION, SOLUTION INTRAVASCULAR ONCE
Status: COMPLETED | OUTPATIENT
Start: 2024-09-05 | End: 2024-09-05

## 2024-09-05 RX ORDER — FENTANYL CITRATE 50 UG/ML
50 INJECTION, SOLUTION INTRAMUSCULAR; INTRAVENOUS EVERY 5 MIN PRN
Status: DISCONTINUED | OUTPATIENT
Start: 2024-09-05 | End: 2024-09-05 | Stop reason: HOSPADM

## 2024-09-05 RX ORDER — PROPOFOL 10 MG/ML
INJECTION, EMULSION INTRAVENOUS PRN
Status: DISCONTINUED | OUTPATIENT
Start: 2024-09-05 | End: 2024-09-05

## 2024-09-05 RX ADMIN — HEPARIN SODIUM 1 BAG: 200 INJECTION, SOLUTION INTRAVENOUS at 08:36

## 2024-09-05 RX ADMIN — ACETAMINOPHEN 975 MG: 325 TABLET ORAL at 12:29

## 2024-09-05 RX ADMIN — GLYCOPYRROLATE 0.1 MG: 0.2 INJECTION, SOLUTION INTRAMUSCULAR; INTRAVENOUS at 08:48

## 2024-09-05 RX ADMIN — PHENYLEPHRINE HYDROCHLORIDE 100 MCG: 10 INJECTION INTRAVENOUS at 08:43

## 2024-09-05 RX ADMIN — PROPOFOL 120 MG: 10 INJECTION, EMULSION INTRAVENOUS at 08:10

## 2024-09-05 RX ADMIN — OXYCODONE HYDROCHLORIDE 5 MG: 5 TABLET ORAL at 12:30

## 2024-09-05 RX ADMIN — IODIXANOL 30 ML: 320 INJECTION, SOLUTION INTRAVASCULAR at 09:16

## 2024-09-05 RX ADMIN — PHENYLEPHRINE HYDROCHLORIDE 100 MCG: 10 INJECTION INTRAVENOUS at 08:48

## 2024-09-05 RX ADMIN — Medication 50 MG: at 08:12

## 2024-09-05 RX ADMIN — LIDOCAINE HYDROCHLORIDE 8 ML: 10 INJECTION, SOLUTION EPIDURAL; INFILTRATION; INTRACAUDAL; PERINEURAL at 08:38

## 2024-09-05 RX ADMIN — HEPARIN SODIUM 6500 UNITS: 1000 INJECTION INTRAVENOUS; SUBCUTANEOUS at 08:41

## 2024-09-05 RX ADMIN — SODIUM CHLORIDE, POTASSIUM CHLORIDE, SODIUM LACTATE AND CALCIUM CHLORIDE: 600; 310; 30; 20 INJECTION, SOLUTION INTRAVENOUS at 07:55

## 2024-09-05 RX ADMIN — ONDANSETRON 4 MG: 2 INJECTION INTRAMUSCULAR; INTRAVENOUS at 08:54

## 2024-09-05 RX ADMIN — PHENYLEPHRINE HYDROCHLORIDE 50 MCG: 10 INJECTION INTRAVENOUS at 08:25

## 2024-09-05 RX ADMIN — PHENYLEPHRINE HYDROCHLORIDE 100 MCG: 10 INJECTION INTRAVENOUS at 09:01

## 2024-09-05 RX ADMIN — PHENYLEPHRINE HYDROCHLORIDE 100 MCG: 10 INJECTION INTRAVENOUS at 08:37

## 2024-09-05 RX ADMIN — Medication 200 MG: at 09:14

## 2024-09-05 RX ADMIN — PHENYLEPHRINE HYDROCHLORIDE 100 MCG: 10 INJECTION INTRAVENOUS at 08:55

## 2024-09-05 RX ADMIN — FENTANYL CITRATE 100 MCG: 50 INJECTION INTRAMUSCULAR; INTRAVENOUS at 08:10

## 2024-09-05 RX ADMIN — LIDOCAINE HYDROCHLORIDE 60 MG: 20 INJECTION, SOLUTION INFILTRATION; PERINEURAL at 08:10

## 2024-09-05 ASSESSMENT — ACTIVITIES OF DAILY LIVING (ADL)
ADLS_ACUITY_SCORE: 29
ADLS_ACUITY_SCORE: 27
ADLS_ACUITY_SCORE: 29
ADLS_ACUITY_SCORE: 29

## 2024-09-05 ASSESSMENT — VISUAL ACUITY: OU: GLASSES

## 2024-09-05 NOTE — DISCHARGE INSTRUCTIONS
What you may eat or drink after angiogram:  -- There are no changes in what you should eat or drink after this test except that you should drink at least six to eight 8-ounce glasses of fluid each day for 2 days to flush the contrast (dye) out of your body unless you are on a limited fluids diet.    Moving around after angiogram:  -- After your test: Rest 48 hours and follow the special activity instructions listed.    Special activity restrictions:  -- Limit physical activity for 48 hours.  Rest on a sofa or bed as much as possible.  -- No strenuous activity.  -- No long walks.  -- Avoid climbing stairs if possible.  If you must climb stairs, do it slowly.    Lifting:  -- Do not lift more than 10 pounds for 48 hours.   -- Do not lift more than 20 pounds for 7 days. (A full gallon of water weighs about 8 pounds)    Sexual activity:  -- You can resume sexual activity in 2 days.  Bathing/Swimming:  -- You may shower in 24 hours .  -- You may NOT take a tub bath, swim, or sit in water for 5 days.    The groin puncture site:  Care:  -- Keep the area clean and dry except for during daily shower.  -- Clean the site daily using soap and water while standing in the shower.  Pat dry thoroughly.  -- Cover the groin site with a bandaid until the skin has closed.   -- When you sneeze, cough or laugh, hold pressure on the puncture site.  -- If you must strain when having a bowel movement, hold gentle pressure to the puncture site.    Anesthesia or sedation information:  You have received medication for your procedure that made you sleepy:  -- You may be sleepy, feel less coordinated or feel weak.  To prevent injury, be careful when you walk, bathe, cook or use electrical devices/tools.  -- You may NOT drive or operate mechanical equipment until 24 hours after your procedure.  You also must stop taking narcotic pain medications before driving.  -- A responsible adult should remain with you for at least 24 hours after your  procedure.  You should stay at home and rest today.  -- This may cause you to react differently to alcohol.  Do not drink alcohol for at least 24 hours after your procedure.  -- This may cause you to not think clearly.  Do not make important decisions for 24 hours after your procedure.  -- To prevent burns, do not smoke.    Local anesthesia:  -- You had local anesthesia (numbing medicine) for your procedure.  The numbness should go away a few hours after the procedure.    Your medicines:  -- It is important that you take the medicines on your list.  Work with your health care provider or pharmacist if you have questions about your medicine.    Return to work:  -- You can return to work in 24 hours if your work does not stop you from following your care instructions (such as lifting).  If your work does not allow you to follow the instructions, you should return to work in 48 hours.    For any questions or concerns please call:  Saundra Wade at [facility] [phone] and option 1 during business hours.   Off hours: [phone]  Za Harrington MD  Endovascular Surgical Neuroradiology Fellow  [facility]  [phone]    Contacting your Doctor -   To contact a doctor, call Dr. Marie: [facility]: [phone] [facility]: [phone]  or:  [phone] and ask for the resident on call for Radiology (answered 24 hours a day)   Emergency Department:  [facility]: [phone]  [facility]: [phone] 911 if you are in need of immediate or emergent help

## 2024-09-05 NOTE — PROGRESS NOTES
[facility]     Endovascular Surgical Neuroradiology Pre-Procedure Note      HPI:  Sanchez Muñoz is a 39 year old male with HTNb, HLD, DM and right carotid stenosis who has had episodes of headaches, dizziness with right caudate and internal capsule/temporal lobe stroke. He presents for right carotid artery stent placement for symptomatic carotid stenosis.  He continues to have dizziness and imbalance with unsteady gait.     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 per clinic notes    Hypertension     Hypertriglyceridemia     Lumbar radiculopathy     Tinea versicolor     Uncontrolled diabetes mellitus     on insulin    Vitamin D deficiency        Surgical History:  Past Surgical History:   Procedure Laterality Date    IR MISCELLANEOUS PROCEDURE  8/24/2013    IR MISCELLANEOUS PROCEDURE  8/24/2013    IR MISCELLANEOUS PROCEDURE  8/24/2013    IR MISCELLANEOUS PROCEDURE  8/26/2013    IR MISCELLANEOUS PROCEDURE  8/26/2013    IR MISCELLANEOUS PROCEDURE  9/3/2013       Family History:  Family History   Problem Relation Age of Onset    No Known Problems Mother     No Known Problems Father     No Known Problems Sister     No Known Problems Brother     No Known Problems Maternal Grandmother     No Known Problems Maternal Grandfather     No Known Problems Paternal Grandmother     No Known Problems Paternal Grandfather        Social History:  Social History     Socioeconomic History    Marital status: Single     Spouse name: Not on file    Number of children: Not on file    Years of education: Not on file    Highest education level: Not on file   Occupational History    Not on file   Tobacco Use    Smoking status: Never    Smokeless tobacco: Never   Substance and Sexual Activity    Alcohol use: No    Drug use: No    Sexual activity:  Not on file   Other Topics Concern    Not on file   Social History Narrative    Not on file     Social Determinants of Health     Financial Resource Strain: Low Risk  (7/11/2024)    Received from [facility]    Financial Resource Strain     Difficulty of Paying Living Expenses: 3     Difficulty of Paying Living Expenses: Not on file   Food Insecurity: No Food Insecurity (7/11/2024)    [facility]    Food Insecurity     Worried About Running Out of Food in the Last Year: 1   Transportation Needs: No Transportation Needs (7/11/2024)    [facility]    Transportation Needs     Lack of Transportation (Medical): 1   Physical Activity: Not on file   Stress: Not on file   Social Connections: Socially Integrated (7/11/2024)    [facility]    Social Connections     Frequency of Communication with Friends and Family: 0   Interpersonal Safety: High Risk (9/5/2024)    Interpersonal Safety     Do you feel physically and emotionally safe where you currently live?: No     Within the past 12 months, have you been hit, slapped, kicked or otherwise physically hurt by someone?: No     Within the past 12 months, have you been humiliated or emotionally abused in other ways by your partner or ex-partner?: No   Housing Stability: Low Risk  (7/11/2024)    Received from [facility]    Housing Stability     Unable to Pay for Housing in the Last Year: 1       Allergies:  No Known Allergies    Is there a contrast allergy?  No    Medications:  Medications Prior to Admission   Medication Sig Dispense Refill Last Dose    aspirin 81 MG EC tablet [ASPIRIN 81 MG EC TABLET] Take 1 tablet (81 mg total) by mouth daily. To prevent stroke (Patient taking differently: Take 81 mg by mouth every morning.) 90 tablet 6     atorvastatin (LIPITOR) 80 MG  tablet Take 80 mg by mouth every morning.       clopidogrel (PLAVIX) 75 MG tablet Take 1 tablet (75 mg) by mouth daily for 90 days (Patient taking differently: Take 75 mg by mouth every morning.) 90 tablet 0     dulaglutide (TRULICITY) 1.5 mg/0.5 mL PnIj [DULAGLUTIDE (TRULICITY) 1.5 MG/0.5 ML PNIJ] Inject 1.5 mg under the skin every 7 days. For blood sugars (Patient taking differently: Inject 1.5 mg subcutaneously every 7 days. Mondays) 6 mL 6     lisinopril (ZESTRIL) 20 MG tablet Take 20 mg by mouth every morning.       ticagrelor (BRILINTA) 60 MG tablet Please begin taking 1 tab two times daily 5 days prior to procedure. (Patient not taking: Reported on 8/29/2024) 60 tablet 3    .    ROS:  The 5 point Review of Systems is negative other than noted in the HPI or here.     PHYSICAL EXAMINATION  Vital Signs:  B/P: 132/87,  T: 98.2,  P: 79,  R: 16    Cardio:  RRR  Pulmonary:  no respiratory distress  Abdomen:  non-distended    Neurologic  Mental Status:  fully alert, attentive and oriented, follows commands, speech clear and fluent  Cranial Nerves:  visual fields intact, EOMI with normal smooth pursuit, facial sensation intact and symmetric, left face droop, hearing not formally tested but intact to conversation, palate elevation symmetric and uvula midline, no dysarthria, shoulder shrug strong bilaterally, tongue protrusion midline  Motor:  no abnormal movements, normal tone throughout, normal muscle bulk, no pronator drift, strength 5/5 throughout  right upper and lower extremities, mild left lower ext drift does not hit bed, 5-/5 strength, 5/5 in left upper  Sensory:  intact/symmetric to light touch throughout upper and lower extremities  Coordination:  FNF and HS intact without dysmetria    Pre-procedure National Institutes of Health Stroke Scale:   Not applicable    LABS  (most recent Cr, BUN, GFR, PLT, INR, PTT within the past 7 days):  Recent Labs   Lab 09/05/24  0645 08/29/24  1353   CR  --  1.09   BUN  --   11.4   GFRESTIMATED  --  87    296        Platelet Function P2Y12 (PRU):   56       ASSESSMENT:  39M with multiple vascular co morbidities including A1c 8, hyperlipidemia and hypertriglyceridemia, who has a symptomatic right internal carotid artery stenosis.      PLAN:   Diagnostic cerebral angiogram with carotid stent placement     PRE-PROCEDURE SEDATION ASSESSMENT     Pre-Procedure Sedation Assessment done at 0655.    Expected Level:  Deep Sedation    Indication:  Sedation is required to allow for neurointerventional procedure.    Consent obtained from patient after discussing the risks, benefits and alternatives. An  was used.     PO Intake:  Appropriately NPO for procedure    ASA Class:  Class 2 - MILD SYSTEMIC DISEASE, NO ACUTE PROBLEMS, NO FUNCTIONAL LIMITATIONS.    Mallampati:  Grade 2:  Soft palate, base of uvula, tonsillar pillars, and portion of posterior pharyngeal wall visible    History and physical reviewed and no updates needed. I have reviewed the lab findings, diagnostic data, medications, and the plan for sedation. I have determined this patient to be an appropriate candidate for the planned sedation and procedure and have reassessed the patient IMMEDIATELY PRIOR to sedation and procedure.    Patient was discussed with the Attending, Dr. Marie  , who agrees with the plan.  Za Harrington MD  Endovascular Surgical Neuroradiology Fellow  [facility]  [phone]    Endovascular Surgical Neuroradiology staff is Dr. Marie

## 2024-09-05 NOTE — PROCEDURES
[facility]     Endovascular Surgical Neuroradiology Post-Procedure Note    Pre-Procedure Diagnosis:  Right carotid stenosis  Post-Procedure Diagnosis:  as above    Procedure(s):   Diagnostic cervicocerebral angiography    Findings:  Right internal carotid artery occlusion with collateral flow from posterior cerebral artery collateralization from   posterior pericallosal through anterior callosal artery and subsequent anterior cerebral artery territory, there are collaterals from posterior cerebral artery to middle cerebral artery.     Plan:  bedrest 2 hours  Follow-up with stroke neurology service-if continued strokes then will need likely need bypass  Discharge from PACU once bedrest and discharge criteria met  Would recommend continuing DAPT     Primary Surgeon:  Dr. Carlton Marie  Secondary Surgeon:  Not applicable  Secondary Surgeon Review:  None  Fellow:  Za Harrington MD, Shannon Chaudhry MD  Additional Assistants:  none    Prior to the start of the procedure and with procedural staff participation, I verbally confirmed: the patient s identity using two indicators, relevant allergies, that the procedure was appropriate and matched the consent or emergent situation, and that the correct equipment/implants were available. Immediately prior to starting the procedure I conducted the Time Out with the procedural staff and re-confirmed the patient s name, procedure, and site/side. (The Joint Commission universal protocol was followed.)  Yes    PRU value:  56    Anesthesia:  Performed by Anesthesia  Medications:  6500U IV Heparin, 8ml 1 % Lidocaine  Puncture site:  Right Femoral Artery    Fluoroscopy time (minutes):  12  Radiation dose (mGy):   211.5     Contrast amount (mL):   30      Estimated blood loss (mL):  15    Closure:  Device6F Angioseal    Disposition:  Home after recovery.        Sedation Post-Procedure Summary    Sedatives: Per anesthesia      Za  MD Juany  Endovascular Surgical Neuroradiology Fellow  [facility]  [phone]    Endovascular Surgical Neuroradiology staff is Dr. Marie

## 2024-09-05 NOTE — IR NOTE
Patient Name: Sanchez Muñoz  Medical Record Number: [id number]  Today's Date: 9/5/2024    Procedure: Cerebral angiogram with carotid stenting  Proceduralist: Dr. Chaudhry, Dr. Harrington, Dr. Marie    Procedure Start: 838  Procedure end: 912  Sedation medications administered: General anesthesia     Report given to:  RN PACU  : na    Other Notes: Pt arrived to IR room 3 from . Consent reviewed. Pt denies any questions or concerns regarding procedure. Pt positioned supine and monitored per protocol. Pt tolerated procedure without any noted complications. Pt transferred to PACU.

## 2024-09-05 NOTE — ANESTHESIA PROCEDURE NOTES
Airway       Patient location during procedure: OR       Procedure Start/Stop Times: 9/5/2024 8:16 AM  Staff -        Other Anesthesia Staff: Yessenia Murillo       Performed By: SRNA  Consent for Airway        Urgency: elective  Indications and Patient Condition       Indications for airway management: freddie-procedural       Induction type:intravenous       Mask difficulty assessment: 1 - vent by mask    Final Airway Details       Final airway type: endotracheal airway       Successful airway: ETT - single  Endotracheal Airway Details        ETT size (mm): 7.5       Cuffed: yes       Cuff volume (mL): 8       Successful intubation technique: video laryngoscopy       VL Blade Size: MAC D Blade       Grade View of Cords: 1       Adjucts: stylet       Position: Right       Measured from: gums/teeth       Secured at (cm): 20       Bite block used: None    Post intubation assessment        Placement verified by: capnometry, equal breath sounds and chest rise        Number of attempts at approach: 1       Secured with: tape       Ease of procedure: easy       Dentition: Intact and Unchanged    Medication(s) Administered   Medication Administration Time: 9/5/2024 8:16 AM

## 2024-09-05 NOTE — ANESTHESIA CARE TRANSFER NOTE
Patient: Sanchez Muñoz    Procedure: Procedure(s):  ANESTHESIA, IN NON-OPERATING ROOM SETTING Stent Placement @0800       Diagnosis: Stenosis of right carotid artery [I65.21]  Diagnosis Additional Information: No value filed.    Anesthesia Type:   General     Note:    Oropharynx: oropharynx clear of all foreign objects and spontaneously breathing  Level of Consciousness: drowsy  Oxygen Supplementation: face mask  Level of Supplemental Oxygen (L/min / FiO2): 8  Independent Airway: airway patency satisfactory and stable  Dentition: dentition unchanged  Vital Signs Stable: post-procedure vital signs reviewed and stable  Report to RN Given: handoff report given  Patient transferred to: PACU    Handoff Report: Identifed the Patient, Identified the Reponsible Provider, Reviewed the pertinent medical history, Discussed the surgical course, Reviewed Intra-OP anesthesia mangement and issues during anesthesia, Set expectations for post-procedure period and Allowed opportunity for questions and acknowledgement of understanding  Vitals:  Vitals Value Taken Time   /77 09/05/24 0930   Temp     Pulse 80 09/05/24 0932   Resp 23 09/05/24 0932   SpO2 100 % 09/05/24 0932   Vitals shown include unfiled device data.    Electronically Signed By: Yessenia Murillo  September 5, 2024  9:33 AM

## 2024-09-05 NOTE — ANESTHESIA POSTPROCEDURE EVALUATION
Patient: Sanchez Muñoz    Procedure: Procedure(s):  ANESTHESIA, IN NON-OPERATING ROOM SETTING Stent Placement @0800       Anesthesia Type:  General    Note:  Disposition: Outpatient   Postop Pain Control: Uneventful            Sign Out: Well controlled pain   PONV: No   Neuro/Psych: Uneventful            Sign Out: Acceptable/Baseline neuro status   Airway/Respiratory: Uneventful            Sign Out: Acceptable/Baseline resp. status   CV/Hemodynamics: Uneventful            Sign Out: Acceptable CV status; No obvious hypovolemia; No obvious fluid overload   Other NRE: NONE   DID A NON-ROUTINE EVENT OCCUR? No           Last vitals:  Vitals Value Taken Time   /89 09/05/24 1015   Temp 36.8  C (98.3  F) 09/05/24 0930   Pulse 79 09/05/24 1023   Resp 23 09/05/24 1023   SpO2 100 % 09/05/24 1023   Vitals shown include unfiled device data.    Electronically Signed By: Brad White MD  September 5, 2024  10:24 AM

## 2024-09-06 ENCOUNTER — PATIENT OUTREACH (OUTPATIENT)
Dept: NEUROSURGERY | Facility: CLINIC | Age: 39
End: 2024-09-06
Payer: MEDICARE

## 2024-09-06 NOTE — PROGRESS NOTES
Endovascular Surgical Neuroradiology - Post Discharge Call    Admit: 9/5/24    Discharge: 9/5/24    Facility: [facility]    MD/Service: Dr. Marie/TAYLER    Pre-Procedure Diagnosis:  Right carotid stenosis    Post-Procedure Diagnosis:  as above     Procedure(s):   Diagnostic cervicocerebral angiography     Findings:  Right internal carotid artery occlusion with collateral flow from posterior cerebral artery collateralization from   posterior pericallosal through anterior callosal artery and subsequent anterior cerebral artery territory, there are collaterals from posterior cerebral artery to middle cerebral artery.      Plan: Follow-up with stroke neurology service-if continued strokes then will need likely need bypass. Would recommend continuing DAPT. RN sent message to scheduling to get patient in with Dr. Alonzo or Dr. Callaway for follow-up.     LVMM for patient  letting her know I was calling to check in on patient after his procedure. Left my contact information and let them know I will have scheduling reach out for follow-up appointment with stroke team. Encouraged call back with further questions or concerns.   Silvia Salgado RN 9/6/2024 10:26 AM

## 2024-09-09 ENCOUNTER — TELEPHONE (OUTPATIENT)
Dept: NEUROLOGY | Facility: CLINIC | Age: 39
End: 2024-09-09
Payer: MEDICARE

## 2024-09-09 NOTE — TELEPHONE ENCOUNTER
Patient confirmed scheduled appointment:  Date: 10/30  Time: 10:30  Visit type: New Stroke   Provider: Nitlon   Location: [facility]-pt aware   Testing/imaging: n/a  Additional notes: Per In basket reschedule pt     Kari Ellington on 9/9/2024 at 1:27 PM

## 2024-09-23 NOTE — TELEPHONE ENCOUNTER
RECORDS RECEIVED FROM: Care Everywhere   REASON FOR VISIT: I65.21 (ICD-10-CM) - Stenosis of right carotid artery  I63.89 (ICD-10-CM) - Ac isch multi vasc territories stroke  Z86.73 (ICD-10-CM) - History of ischemic stroke without residual deficits   PROVIDER: Oni Callaway MD   DATE OF APPT: 10/30/24 @ 10:30 am    NOTES (FOR ALL VISITS) STATUS DETAILS   OFFICE NOTE from referring provider Internal Hosp Referral    OFFICE NOTE from other specialist Care Everywhere 8/29/24 Noemí Patel APRN CNP @[facility]-Pre Op    8/9/24 Mark Crockett MD  @[facility]    7/16/24 Carlton Marie MD @[facility]-NeuroSurg    7/11/24 Donavan Núñez MD  @[facility]      DISCHARGE SUMMARY from hospital Internal 9/5/24 Carlton Marie MD @[facility]    7/8/24-7/10/24 Sakina Rebolledo MD @[facility]      MEDICATION LIST Internal    IMAGING  (FOR ALL VISITS)     IR Internal [facility]  9/5/24 IR Carotid Cerebral Angio Bilat     ULTRASOUND (CAROTID BILAT) *VASCULAR* Internal [facility]  7/9/24 US Carotid Bilat     MRI (HEAD, NECK, SPINE) Internal [facility]  7/8/24 MR Brain     CT (HEAD, NECK, SPINE) Internal [facility]  7/8/24 CTA Head Neck

## 2024-09-24 ENCOUNTER — TELEPHONE (OUTPATIENT)
Dept: NEUROSURGERY | Facility: CLINIC | Age: 39
End: 2024-09-24
Payer: MEDICARE

## 2024-09-24 ENCOUNTER — TELEPHONE (OUTPATIENT)
Dept: RADIOLOGY | Facility: CLINIC | Age: 39
End: 2024-09-24
Payer: MEDICARE

## 2024-09-24 NOTE — TELEPHONE ENCOUNTER
Patients friend May is reaching out regarding getting patient set up for next visit. Requesting to speak with someone from care team.        Connie Otero on 9/24/2024 at 9:06 AM

## 2024-09-24 NOTE — TELEPHONE ENCOUNTER
Received message from scheduling:    Patients friend May is reaching out regarding getting patient set up for next visit. Requesting to speak with someone from care team.        Spoke with May. States patient needs follow-up visit with Stroke Neuro. Informed that patient is already scheduled with Dr. Callaway on 10/30/24 at 1015/1030 at the [facility]. May verbalized understanding and appreciative of the call. Contact information provided and encouraged to call with questions/concerns.    Saundra Wade RN 9/24/2024 9:22 AM

## 2024-10-30 ENCOUNTER — PRE VISIT (OUTPATIENT)
Dept: NEUROLOGY | Facility: CLINIC | Age: 39
End: 2024-10-30

## 2024-10-30 ENCOUNTER — OFFICE VISIT (OUTPATIENT)
Dept: NEUROLOGY | Facility: CLINIC | Age: 39
End: 2024-10-30
Attending: STUDENT IN AN ORGANIZED HEALTH CARE EDUCATION/TRAINING PROGRAM
Payer: MEDICARE

## 2024-10-30 VITALS
HEART RATE: 77 BPM | RESPIRATION RATE: 16 BRPM | OXYGEN SATURATION: 99 % | DIASTOLIC BLOOD PRESSURE: 71 MMHG | SYSTOLIC BLOOD PRESSURE: 106 MMHG

## 2024-10-30 DIAGNOSIS — I63.89 AC ISCH MULTI VASC TERRITORIES STROKE (H): ICD-10-CM

## 2024-10-30 DIAGNOSIS — Z86.73 HISTORY OF ISCHEMIC STROKE WITHOUT RESIDUAL DEFICITS: Chronic | ICD-10-CM

## 2024-10-30 DIAGNOSIS — I65.21 STENOSIS OF RIGHT CAROTID ARTERY: ICD-10-CM

## 2024-10-30 DIAGNOSIS — H53.8 BLURRED VISION: Primary | ICD-10-CM

## 2024-10-30 PROCEDURE — 99417 PROLNG OP E/M EACH 15 MIN: CPT | Performed by: PSYCHIATRY & NEUROLOGY

## 2024-10-30 PROCEDURE — 99215 OFFICE O/P EST HI 40 MIN: CPT | Performed by: PSYCHIATRY & NEUROLOGY

## 2024-10-30 ASSESSMENT — PAIN SCALES - GENERAL: PAINLEVEL_OUTOF10: MODERATE PAIN (4)

## 2024-10-30 NOTE — H&P (VIEW-ONLY)
No LOS data to display   Time spent by me doing chart review, history and exam, documentation and further activities per the note        _____________________________________________________________    [facility]    at [facility]  _____________________________________________________________      Chief Complaint: Patient presents with:  Consult      History of Present Illness: Sanchez Muñoz is a 39 year old male presenting as a new patient for stroke follow up.    39 year old male with past medical hx of DM HTN Hld know R ICA stenosis presented to [place] ED on 7/9/2024 with headaches and progressive dizziness for 1 week. Work up showed MRI with acute infarct in R caudate , MEDHAT , right hippocampus and R temporal lobe. CTA showed R ICA high grade stenosis with moderate narrowing of R m1 and multifocal narrowing of IZABELLA. Further work up showed echo with preserved EF and no cardioembolic sources. According to the patient since discharge his vision is getting worse. He notices blurred vision and tunneled vision. The tunneling of vision has worsened since then. To given an example pt while watching TV is unable to see subtitles on the screen. He reports that vision change improves when lying down. They are there constantly. They are not better or worse on any particular time of the day. He mentions that reading bright signal may be difficult for him. He also reports headache which is stable since July however was new in July. Headache is episodic. Episodes are short and brief and he describes headache as sharp pain on bitemporal region and radiating to the R side of the neck . He denies any vertiginous symptoms.     Pt has a hx of stroke in 2013 and has residual L sided weakness from it. On review of records it appears pt had DSA and MRI in 2013 but reports are not in the system. Personal review shows R terminal ICA occlusion. Mri brain showed R IC  infarct ( larger than lacunar stroke) likely anterior choroidal artery infarct.     MRI 10/8/2014  HEAD MRI:  1.  Interval evolutionary changes of now chronic right posterior limb internal capsule and corona radiata infarcts with encephalomalacia and gliosis. No new restricted diffusion, space occupying hemorrhage, or intracranial mass.     2.  Small amount of presumed chronic hemosiderin deposition within the right internal capsule chronic infarct.     HEAD MRA:  1.  There has been progression of stenosis involving the distal right internal carotid artery which is diffusely small in caliber intracranially and tapers to critical stenosis or near-occlusion at its junction with the right M1 segment. The right MCA   branches do appear to opacify symmetrically and normally when compared to the left on this examination.     2.  Remainder of the MRA is normal and unchanged. 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     NECK MRA:  1.  Development of mild diffuse narrowing of the right ICA throughout its cervical course with more focal moderate narrowing of the proximal right ICA of 50-60%. These findings are indeterminate though could be related to interval dissection since   08/30/2013. The vessel remains patent. CTA or conventional angiography may be helpful for clarification.     8/26/2017    IMPRESSION:  CONCLUSION:  HEAD MRA:  1.  Redemonstration of marked narrowing at the junction of right ICA terminus and the right M1. Diminished caliber of visualized right ICA, similar to prior.   2.  Asymmetry in the appearance of MCA branches with less numerous and less opacified branches on the right, similar to prior.   3.  Diminished opacification of A2 segments and its branches bilaterally, new since prior. It is due to combination of above described right ICA narrowing and either hypoplasia or narrowing  of left A1.  There may be narrowing at the left ICA terminus,   better appreciated on prior exams.     NECK MRA:  1.  Redemonstration of diffuse narrowing of the right ICA throughout the neck, most prominent in its proximal aspect.   2.  Narrowing proximally is slightly progressed since the prior, measured at approximately 70% just distal to the bifurcation compared with 60-70% on the prior.  3.  Normal left ICA and both vertebral arteries.        Stroke Evaluation Summarized     MRI/Head CT MRI Right caudate body, right internal capsule posterior limb, right hippocampus, right temporal stem, right temporal lobe white matter demonstrate multiple small acute infarcts.       Intracranial Vasculature NECK CTA:  1.  High-grade critical narrowing of the right internal carotid artery starting at the origin to the skull base, worsened since previous MRA 08/26/2017.  2.  No hemodynamically significant narrowing of the left internal carotid artery based on the NASCET criteria.  3.  The vertebral arteries are patent throughout their course in the neck.     Cervical Vasculature HEAD CTA:  1.  High-grade narrowing of the distal extracranial internal carotid artery, worsened since prior head CTA 08/24/2013 and MRA 08/26/2017.  2.  Redemonstration of moderate narrowing of the M1 segment of the right middle cerebral artery, similar to the prior exam.  3.  Multifocal moderate narrowing of the A2 and A3 segments of the anterior cerebral arteries. Mild to moderate narrowing of the supraclinoid left internal carotid artery.      Echocardiogram TTE:Left ventricular size, wall motion and function are normal. The ejection  fraction is 60-65%.  Normal right ventricle size and systolic function.  A contrast injection (Bubble Study) was performed that was negative for flow  across the interatrial septum.  No hemodynamically significant valvular abnormalities on 2D or color flow  imaging.   EKG/Telemetry NSR   Hypercoagulable work  NA    Vasculitis work up NA   CSF analysis NA      LDL  LDL Cholesterol Calculated   Date Value Ref Range Status   07/08/2024   Final     Comment:     Cannot estimate LDL when triglyceride exceeds 400 mg/dL     LDL Cholesterol Direct   Date Value Ref Range Status   09/23/2020 142 (H) <=129 mg/dl Final        A1C  8.1          DSA 09/05/2024 Impression:  Right internal carotid artery occlusion at the level of the  bifurcation with compensatory leptomeningeal anastomoses between the  posterior cerebral artery and middle cerebral artery. As well as  external carotid artery to internal carotid artery anastomoses via the  ophthalmic artery and the middle meningeal artery.            Impression:   Problem List Items Addressed This Visit          Nervous and Auditory    Ac isch multi vasc territories stroke (H)       Circulatory    Stenosis of right carotid artery       Other    History of ischemic stroke without residual deficits (Chronic)     39 year old male with past medical hx of HTN HLD DM and known R ICA stenosis and ischemic stroke presents for follow up. Review of previous imaging shows R ICA terminus occlusion and slow progressive stenosis of proximal ICA. Recent CTA shows R ICA occlusion which was confirmed on DSA as well. Pt has had two strokes both appears to be in R sub cortical areas likely choroidal artery territory. Talked to pt about treatment options. Since artery is completely occluded so pt is not a candidate for stenting . Other potential option is bypass. Will need to continue with best risk factor management and control and evaluate for other potential etiologies and rule out cardiac reasons for stroke in young.    Plan:   - Continue DAPT  - High intensity statin  - KRISSY HELIO and zio patch  - Ophthalmology referral  - Follow up in 3 months    Stroke Education provided.  He will call us with any questions.  For any acute neurologic deficits he was advised to  go directly to the hospital rather than call  "the clinic.    Oni Callaway MD  Neurology  10/30/2024 10:34 AM  To page me or covering stroke neurology team member, click here: AMCOM  Choose \"On Call\" tab at top, then search dropdown box for \"Neurology Adult\" & press Enter, look for Neuro ICU/Stroke    ___________________________________________________________________    Current Medications  Current Outpatient Medications   Medication Sig Dispense Refill    aspirin 81 MG EC tablet [ASPIRIN 81 MG EC TABLET] Take 1 tablet (81 mg total) by mouth daily. To prevent stroke 90 tablet 6    atorvastatin (LIPITOR) 80 MG tablet Take 80 mg by mouth every morning.      dulaglutide (TRULICITY) 1.5 mg/0.5 mL PnIj [DULAGLUTIDE (TRULICITY) 1.5 MG/0.5 ML PNIJ] Inject 1.5 mg under the skin every 7 days. For blood sugars 6 mL 6    lisinopril (ZESTRIL) 20 MG tablet Take 20 mg by mouth every morning.      clopidogrel (PLAVIX) 75 MG tablet Take 1 tablet (75 mg) by mouth daily for 90 days (Patient taking differently: Take 75 mg by mouth every morning.) 90 tablet 0    ticagrelor (BRILINTA) 60 MG tablet Please begin taking 1 tab two times daily 5 days prior to procedure. (Patient not taking: Reported on 10/30/2024) 60 tablet 3     No current facility-administered medications for this visit.       Past Medical History  Past Medical History:   Diagnosis Date    Anxiety     Cerebrovascular accident (CVA) involving anterior circulation of right side (H)     hemorrhagic due to right ica dissection per clinic notes    Hypertension     Hypertriglyceridemia     Lumbar radiculopathy     Tinea versicolor     Uncontrolled diabetes mellitus     on insulin    Vitamin D deficiency        Social History  Social History     Tobacco Use    Smoking status: Never    Smokeless tobacco: Never   Substance Use Topics    Alcohol use: No    Drug use: No       Family History  Family History   Problem Relation Age of Onset    No Known Problems Mother     No Known Problems Father     No Known Problems Sister     " No Known Problems Brother     No Known Problems Maternal Grandmother     No Known Problems Maternal Grandfather     No Known Problems Paternal Grandmother     No Known Problems Paternal Grandfather        ROS: 10 point relevant ROS neg other than the symptoms noted above in the HPI.    Physical Exam    /71   Pulse 77   Resp 16   SpO2 99%     General:  no acute distress  HEENT:  normocephalic/atraumatic  Pulmonary:  no respiratory distress    Neurologic  Mental Status:  alert, oriented x 3, follows commands, speech clear and fluent, naming and repetition normal  Cranial Nerves:  EOMI with normal smooth pursuit, L facial weakness, hearing not formally tested but intact to conversation, no dysarthria, shoulder shrug equal bilaterally, tongue protrusion midline  Motor:  mild L arm and leg weakness  Reflexes:  Intact  Sensory:  Intact to light touch in bilateral upper and bilateral lower extremities  Coordination:  normal finger-to-nose and heel-to-shin bilaterally without dysmetria, rapid alternating movements symmetric  Station/Gait:  Normal    Neuroimaging: as per HPI. I    Labs:    Recent Labs   Lab Test 09/05/24  0645   INR 1.01        Recent Labs   Lab Test 07/08/24  1733 09/23/20  1411 07/29/19  0934   CHOL 226* 319* 263*   HDL 34* 39* 36*   LDL  --  142* 142*   TRIG 407* 916* 461*       Recent Labs   Lab Test 07/08/24  1733 04/12/21  1753 12/08/20  0919   A1C 8.1* 8.1* 7.2*       No lab results found.

## 2024-10-30 NOTE — LETTER
10/30/2024       RE: Sanchez Muñoz  [address]  [address]  [place] [address]     Dear Colleague,    Thank you for referring your patient, Sanchez Muñoz, to the [facility] [place] at [facility]. Please see a copy of my visit note below.      No LOS data to display   Time spent by me doing chart review, history and exam, documentation and further activities per the note        _____________________________________________________________    [facility]    at [facility]  _____________________________________________________________      Chief Complaint: Patient presents with:  Consult      History of Present Illness: Sanchez Muñoz is a 39 year old male presenting as a new patient for stroke follow up.    39 year old male with past medical hx of DM HTN Hld know R ICA stenosis presented to [place] ED on 7/9/2024 with headaches and progressive dizziness for 1 week. Work up showed MRI with acute infarct in R caudate , MEDHAT , right hippocampus and R temporal lobe. CTA showed R ICA high grade stenosis with moderate narrowing of R m1 and multifocal narrowing of IZABELLA. Further work up showed echo with preserved EF and no cardioembolic sources. According to the patient since discharge his vision is getting worse. He notices blurred vision and tunneled vision. The tunneling of vision has worsened since then. To given an example pt while watching TV is unable to see subtitles on the screen. He reports that vision change improves when lying down. They are there constantly. They are not better or worse on any particular time of the day. He mentions that reading bright signal may be difficult for him. He also reports headache which is stable since July however was new in July. Headache is episodic. Episodes are short and brief and he describes headache as sharp pain on bitemporal region and  radiating to the R side of the neck . He denies any vertiginous symptoms.     Pt has a hx of stroke in 2013 and has residual L sided weakness from it. On review of records it appears pt had DSA and MRI in 2013 but reports are not in the system. Personal review shows R terminal ICA occlusion. Mri brain showed R IC infarct ( larger than lacunar stroke) likely anterior choroidal artery infarct.     MRI 10/8/2014  HEAD MRI:  1.  Interval evolutionary changes of now chronic right posterior limb internal capsule and corona radiata infarcts with encephalomalacia and gliosis. No new restricted diffusion, space occupying hemorrhage, or intracranial mass.     2.  Small amount of presumed chronic hemosiderin deposition within the right internal capsule chronic infarct.     HEAD MRA:  1.  There has been progression of stenosis involving the distal right internal carotid artery which is diffusely small in caliber intracranially and tapers to critical stenosis or near-occlusion at its junction with the right M1 segment. The right MCA   branches do appear to opacify symmetrically and normally when compared to the left on this examination.     2.  Remainder of the MRA is normal and unchanged. 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     NECK MRA:  1.  Development of mild diffuse narrowing of the right ICA throughout its cervical course with more focal moderate narrowing of the proximal right ICA of 50-60%. These findings are indeterminate though could be related to interval dissection since   08/30/2013. The vessel remains patent. CTA or conventional angiography may be helpful for clarification.     8/26/2017    IMPRESSION:  CONCLUSION:  HEAD MRA:  1.  Redemonstration of marked narrowing at the junction of right ICA terminus and the right M1. Diminished caliber of visualized right ICA, similar to prior.   2.   Asymmetry in the appearance of MCA branches with less numerous and less opacified branches on the right, similar to prior.   3.  Diminished opacification of A2 segments and its branches bilaterally, new since prior. It is due to combination of above described right ICA narrowing and either hypoplasia or narrowing of left A1.  There may be narrowing at the left ICA terminus,   better appreciated on prior exams.     NECK MRA:  1.  Redemonstration of diffuse narrowing of the right ICA throughout the neck, most prominent in its proximal aspect.   2.  Narrowing proximally is slightly progressed since the prior, measured at approximately 70% just distal to the bifurcation compared with 60-70% on the prior.  3.  Normal left ICA and both vertebral arteries.        Stroke Evaluation Summarized     MRI/Head CT MRI Right caudate body, right internal capsule posterior limb, right hippocampus, right temporal stem, right temporal lobe white matter demonstrate multiple small acute infarcts.       Intracranial Vasculature NECK CTA:  1.  High-grade critical narrowing of the right internal carotid artery starting at the origin to the skull base, worsened since previous MRA 08/26/2017.  2.  No hemodynamically significant narrowing of the left internal carotid artery based on the NASCET criteria.  3.  The vertebral arteries are patent throughout their course in the neck.     Cervical Vasculature HEAD CTA:  1.  High-grade narrowing of the distal extracranial internal carotid artery, worsened since prior head CTA 08/24/2013 and MRA 08/26/2017.  2.  Redemonstration of moderate narrowing of the M1 segment of the right middle cerebral artery, similar to the prior exam.  3.  Multifocal moderate narrowing of the A2 and A3 segments of the anterior cerebral arteries. Mild to moderate narrowing of the supraclinoid left internal carotid artery.      Echocardiogram TTE:Left ventricular size, wall motion and function are normal. The  ejection  fraction is 60-65%.  Normal right ventricle size and systolic function.  A contrast injection (Bubble Study) was performed that was negative for flow  across the interatrial septum.  No hemodynamically significant valvular abnormalities on 2D or color flow  imaging.   EKG/Telemetry NSR   Hypercoagulable work  NA   Vasculitis work up NA   CSF analysis NA      LDL  LDL Cholesterol Calculated   Date Value Ref Range Status   07/08/2024   Final     Comment:     Cannot estimate LDL when triglyceride exceeds 400 mg/dL     LDL Cholesterol Direct   Date Value Ref Range Status   09/23/2020 142 (H) <=129 mg/dl Final        A1C  8.1          DSA 09/05/2024 Impression:  Right internal carotid artery occlusion at the level of the  bifurcation with compensatory leptomeningeal anastomoses between the  posterior cerebral artery and middle cerebral artery. As well as  external carotid artery to internal carotid artery anastomoses via the  ophthalmic artery and the middle meningeal artery.            Impression:   Problem List Items Addressed This Visit          Nervous and Auditory    Ac isch multi vasc territories stroke (H)       Circulatory    Stenosis of right carotid artery       Other    History of ischemic stroke without residual deficits (Chronic)     39 year old male with past medical hx of HTN HLD DM and known R ICA stenosis and ischemic stroke presents for follow up. Review of previous imaging shows R ICA terminus occlusion and slow progressive stenosis of proximal ICA. Recent CTA shows R ICA occlusion which was confirmed on DSA as well. Pt has had two strokes both appears to be in R sub cortical areas likely choroidal artery territory. Talked to pt about treatment options. Since artery is completely occluded so pt is not a candidate for stenting . Other potential option is bypass. Will need to continue with best risk factor management and control and evaluate for other potential etiologies and rule out cardiac  "reasons for stroke in young.    Plan:   - Continue DAPT  - High intensity statin  - KRISSY HELIO and zio patch  - Ophthalmology referral  - Follow up in 3 months    Stroke Education provided.  He will call us with any questions.  For any acute neurologic deficits he was advised to  go directly to the hospital rather than call the clinic.    Oni Callaway MD  Neurology  10/30/2024 10:34 AM  To page me or covering stroke neurology team member, click here: AMCOM  Choose \"On Call\" tab at top, then search dropdown box for \"Neurology Adult\" & press Enter, look for Neuro ICU/Stroke    ___________________________________________________________________    Current Medications  Current Outpatient Medications   Medication Sig Dispense Refill     aspirin 81 MG EC tablet [ASPIRIN 81 MG EC TABLET] Take 1 tablet (81 mg total) by mouth daily. To prevent stroke 90 tablet 6     atorvastatin (LIPITOR) 80 MG tablet Take 80 mg by mouth every morning.       dulaglutide (TRULICITY) 1.5 mg/0.5 mL PnIj [DULAGLUTIDE (TRULICITY) 1.5 MG/0.5 ML PNIJ] Inject 1.5 mg under the skin every 7 days. For blood sugars 6 mL 6     lisinopril (ZESTRIL) 20 MG tablet Take 20 mg by mouth every morning.       clopidogrel (PLAVIX) 75 MG tablet Take 1 tablet (75 mg) by mouth daily for 90 days (Patient taking differently: Take 75 mg by mouth every morning.) 90 tablet 0     ticagrelor (BRILINTA) 60 MG tablet Please begin taking 1 tab two times daily 5 days prior to procedure. (Patient not taking: Reported on 10/30/2024) 60 tablet 3     No current facility-administered medications for this visit.       Past Medical History  Past Medical History:   Diagnosis Date     Anxiety      Cerebrovascular accident (CVA) involving anterior circulation of right side (H)     hemorrhagic due to right ica dissection per clinic notes     Hypertension      Hypertriglyceridemia      Lumbar radiculopathy      Tinea versicolor      Uncontrolled diabetes mellitus     on insulin     Vitamin " D deficiency        Social History  Social History     Tobacco Use     Smoking status: Never     Smokeless tobacco: Never   Substance Use Topics     Alcohol use: No     Drug use: No       Family History  Family History   Problem Relation Age of Onset     No Known Problems Mother      No Known Problems Father      No Known Problems Sister      No Known Problems Brother      No Known Problems Maternal Grandmother      No Known Problems Maternal Grandfather      No Known Problems Paternal Grandmother      No Known Problems Paternal Grandfather        ROS: 10 point relevant ROS neg other than the symptoms noted above in the HPI.    Physical Exam    /71   Pulse 77   Resp 16   SpO2 99%     General:  no acute distress  HEENT:  normocephalic/atraumatic  Pulmonary:  no respiratory distress    Neurologic  Mental Status:  alert, oriented x 3, follows commands, speech clear and fluent, naming and repetition normal  Cranial Nerves:  EOMI with normal smooth pursuit, L facial weakness, hearing not formally tested but intact to conversation, no dysarthria, shoulder shrug equal bilaterally, tongue protrusion midline  Motor:  mild L arm and leg weakness  Reflexes:  Intact  Sensory:  Intact to light touch in bilateral upper and bilateral lower extremities  Coordination:  normal finger-to-nose and heel-to-shin bilaterally without dysmetria, rapid alternating movements symmetric  Station/Gait:  Normal    Neuroimaging: as per HPI. I    Labs:    Recent Labs   Lab Test 09/05/24  0645   INR 1.01        Recent Labs   Lab Test 07/08/24  1733 09/23/20  1411 07/29/19  0934   CHOL 226* 319* 263*   HDL 34* 39* 36*   LDL  --  142* 142*   TRIG 407* 916* 461*       Recent Labs   Lab Test 07/08/24  1733 04/12/21  1753 12/08/20  0919   A1C 8.1* 8.1* 7.2*       No lab results found.      Again, thank you for allowing me to participate in the care of your patient.      Sincerely,    Oni Callaway MD

## 2024-10-30 NOTE — PROGRESS NOTES
No LOS data to display   Time spent by me doing chart review, history and exam, documentation and further activities per the note        _____________________________________________________________    [facility]    at [facility]  _____________________________________________________________      Chief Complaint: Patient presents with:  Consult      History of Present Illness: Sanchez Muñoz is a 39 year old male presenting as a new patient for stroke follow up.    39 year old male with past medical hx of DM HTN Hld know R ICA stenosis presented to [facility] on 7/9/2024 with headaches and progressive dizziness for 1 week. Work up showed MRI with acute infarct in R caudate , MEDHAT , right hippocampus and R temporal lobe. CTA showed R ICA high grade stenosis with moderate narrowing of R m1 and multifocal narrowing of IZABELLA. Further work up showed echo with preserved EF and no cardioembolic sources. According to the patient since discharge his vision is getting worse. He notices blurred vision and tunneled vision. The tunneling of vision has worsened since then. To given an example pt while watching TV is unable to see subtitles on the screen. He reports that vision change improves when lying down. They are there constantly. They are not better or worse on any particular time of the day. He mentions that reading bright signal may be difficult for him. He also reports headache which is stable since July however was new in July. Headache is episodic. Episodes are short and brief and he describes headache as sharp pain on bitemporal region and radiating to the R side of the neck . He denies any vertiginous symptoms.     Pt has a hx of stroke in 2013 and has residual L sided weakness from it. On review of records it appears pt had DSA and MRI in 2013 but reports are not in the system. Personal review shows R terminal ICA occlusion. Mri brain showed R IC  infarct ( larger than lacunar stroke) likely anterior choroidal artery infarct.     MRI 10/8/2014  HEAD MRI:  1.  Interval evolutionary changes of now chronic right posterior limb internal capsule and corona radiata infarcts with encephalomalacia and gliosis. No new restricted diffusion, space occupying hemorrhage, or intracranial mass.     2.  Small amount of presumed chronic hemosiderin deposition within the right internal capsule chronic infarct.     HEAD MRA:  1.  There has been progression of stenosis involving the distal right internal carotid artery which is diffusely small in caliber intracranially and tapers to critical stenosis or near-occlusion at its junction with the right M1 segment. The right MCA   branches do appear to opacify symmetrically and normally when compared to the left on this examination.     2.  Remainder of the MRA is normal and unchanged. 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     NECK MRA:  1.  Development of mild diffuse narrowing of the right ICA throughout its cervical course with more focal moderate narrowing of the proximal right ICA of 50-60%. These findings are indeterminate though could be related to interval dissection since   08/30/2013. The vessel remains patent. CTA or conventional angiography may be helpful for clarification.     8/26/2017    IMPRESSION:  CONCLUSION:  HEAD MRA:  1.  Redemonstration of marked narrowing at the junction of right ICA terminus and the right M1. Diminished caliber of visualized right ICA, similar to prior.   2.  Asymmetry in the appearance of MCA branches with less numerous and less opacified branches on the right, similar to prior.   3.  Diminished opacification of A2 segments and its branches bilaterally, new since prior. It is due to combination of above described right ICA narrowing and either hypoplasia or narrowing  of left A1.  There may be narrowing at the left ICA terminus,   better appreciated on prior exams.     NECK MRA:  1.  Redemonstration of diffuse narrowing of the right ICA throughout the neck, most prominent in its proximal aspect.   2.  Narrowing proximally is slightly progressed since the prior, measured at approximately 70% just distal to the bifurcation compared with 60-70% on the prior.  3.  Normal left ICA and both vertebral arteries.        Stroke Evaluation Summarized     MRI/Head CT MRI Right caudate body, right internal capsule posterior limb, right hippocampus, right temporal stem, right temporal lobe white matter demonstrate multiple small acute infarcts.       Intracranial Vasculature NECK CTA:  1.  High-grade critical narrowing of the right internal carotid artery starting at the origin to the skull base, worsened since previous MRA 08/26/2017.  2.  No hemodynamically significant narrowing of the left internal carotid artery based on the NASCET criteria.  3.  The vertebral arteries are patent throughout their course in the neck.     Cervical Vasculature HEAD CTA:  1.  High-grade narrowing of the distal extracranial internal carotid artery, worsened since prior head CTA 08/24/2013 and MRA 08/26/2017.  2.  Redemonstration of moderate narrowing of the M1 segment of the right middle cerebral artery, similar to the prior exam.  3.  Multifocal moderate narrowing of the A2 and A3 segments of the anterior cerebral arteries. Mild to moderate narrowing of the supraclinoid left internal carotid artery.      Echocardiogram TTE:Left ventricular size, wall motion and function are normal. The ejection  fraction is 60-65%.  Normal right ventricle size and systolic function.  A contrast injection (Bubble Study) was performed that was negative for flow  across the interatrial septum.  No hemodynamically significant valvular abnormalities on 2D or color flow  imaging.   EKG/Telemetry NSR   Hypercoagulable work  NA    Vasculitis work up NA   CSF analysis NA      LDL  LDL Cholesterol Calculated   Date Value Ref Range Status   07/08/2024   Final     Comment:     Cannot estimate LDL when triglyceride exceeds 400 mg/dL     LDL Cholesterol Direct   Date Value Ref Range Status   09/23/2020 142 (H) <=129 mg/dl Final        A1C  8.1          DSA 09/05/2024 Impression:  Right internal carotid artery occlusion at the level of the  bifurcation with compensatory leptomeningeal anastomoses between the  posterior cerebral artery and middle cerebral artery. As well as  external carotid artery to internal carotid artery anastomoses via the  ophthalmic artery and the middle meningeal artery.            Impression:   Problem List Items Addressed This Visit          Nervous and Auditory    Ac isch multi vasc territories stroke (H)       Circulatory    Stenosis of right carotid artery       Other    History of ischemic stroke without residual deficits (Chronic)     39 year old male with past medical hx of HTN HLD DM and known R ICA stenosis and ischemic stroke presents for follow up. Review of previous imaging shows R ICA terminus occlusion and slow progressive stenosis of proximal ICA. Recent CTA shows R ICA occlusion which was confirmed on DSA as well. Pt has had two strokes both appears to be in R sub cortical areas likely choroidal artery territory. Talked to pt about treatment options. Since artery is completely occluded so pt is not a candidate for stenting . Other potential option is bypass. Will need to continue with best risk factor management and control and evaluate for other potential etiologies and rule out cardiac reasons for stroke in young.    Plan:   - Continue DAPT  - High intensity statin  - KRISSY HELIO and zio patch  - Ophthalmology referral  - Follow up in 3 months    Stroke Education provided.  He will call us with any questions.  For any acute neurologic deficits he was advised to  go directly to the hospital rather than call  "the clinic.    Oni Callaway MD  Neurology  10/30/2024 10:34 AM  To page me or covering stroke neurology team member, click here: AMCOM  Choose \"On Call\" tab at top, then search dropdown box for \"Neurology Adult\" & press Enter, look for Neuro ICU/Stroke    ___________________________________________________________________    Current Medications  Current Outpatient Medications   Medication Sig Dispense Refill    aspirin 81 MG EC tablet [ASPIRIN 81 MG EC TABLET] Take 1 tablet (81 mg total) by mouth daily. To prevent stroke 90 tablet 6    atorvastatin (LIPITOR) 80 MG tablet Take 80 mg by mouth every morning.      dulaglutide (TRULICITY) 1.5 mg/0.5 mL PnIj [DULAGLUTIDE (TRULICITY) 1.5 MG/0.5 ML PNIJ] Inject 1.5 mg under the skin every 7 days. For blood sugars 6 mL 6    lisinopril (ZESTRIL) 20 MG tablet Take 20 mg by mouth every morning.      clopidogrel (PLAVIX) 75 MG tablet Take 1 tablet (75 mg) by mouth daily for 90 days (Patient taking differently: Take 75 mg by mouth every morning.) 90 tablet 0    ticagrelor (BRILINTA) 60 MG tablet Please begin taking 1 tab two times daily 5 days prior to procedure. (Patient not taking: Reported on 10/30/2024) 60 tablet 3     No current facility-administered medications for this visit.       Past Medical History  Past Medical History:   Diagnosis Date    Anxiety     Cerebrovascular accident (CVA) involving anterior circulation of right side (H)     hemorrhagic due to right ica dissection per clinic notes    Hypertension     Hypertriglyceridemia     Lumbar radiculopathy     Tinea versicolor     Uncontrolled diabetes mellitus     on insulin    Vitamin D deficiency        Social History  Social History     Tobacco Use    Smoking status: Never    Smokeless tobacco: Never   Substance Use Topics    Alcohol use: No    Drug use: No       Family History  Family History   Problem Relation Age of Onset    No Known Problems Mother     No Known Problems Father     No Known Problems Sister     " No Known Problems Brother     No Known Problems Maternal Grandmother     No Known Problems Maternal Grandfather     No Known Problems Paternal Grandmother     No Known Problems Paternal Grandfather        ROS: 10 point relevant ROS neg other than the symptoms noted above in the HPI.    Physical Exam    /71   Pulse 77   Resp 16   SpO2 99%     General:  no acute distress  HEENT:  normocephalic/atraumatic  Pulmonary:  no respiratory distress    Neurologic  Mental Status:  alert, oriented x 3, follows commands, speech clear and fluent, naming and repetition normal  Cranial Nerves:  EOMI with normal smooth pursuit, L facial weakness, hearing not formally tested but intact to conversation, no dysarthria, shoulder shrug equal bilaterally, tongue protrusion midline  Motor:  mild L arm and leg weakness  Reflexes:  Intact  Sensory:  Intact to light touch in bilateral upper and bilateral lower extremities  Coordination:  normal finger-to-nose and heel-to-shin bilaterally without dysmetria, rapid alternating movements symmetric  Station/Gait:  Normal    Neuroimaging: as per HPI. I    Labs:    Recent Labs   Lab Test 09/05/24  0645   INR 1.01        Recent Labs   Lab Test 07/08/24  1733 09/23/20  1411 07/29/19  0934   CHOL 226* 319* 263*   HDL 34* 39* 36*   LDL  --  142* 142*   TRIG 407* 916* 461*       Recent Labs   Lab Test 07/08/24  1733 04/12/21  1753 12/08/20  0919   A1C 8.1* 8.1* 7.2*       No lab results found.

## 2024-11-04 NOTE — PATIENT INSTRUCTIONS
- Continue DAPT  - High intensity statin  - KRISSY HELIO and zio patch  - Ophthalmology referral  - Follow up in 3 months    Stroke & Endovascular RN Care Coordinators:    Silvia Salgado, RN, BSN  Saundra Wade, RN, CNRN, SCRN    If you have any questions please contact the RN Care Coordinators at [phone], option 1.     Thank you for choosing [facility] for your health care needs.

## 2024-11-05 ENCOUNTER — TELEPHONE (OUTPATIENT)
Dept: CALL CENTER | Age: 39
End: 2024-11-05
Payer: MEDICARE

## 2024-11-05 NOTE — TELEPHONE ENCOUNTER
Health Call Center    Phone Message    May a detailed message be left on voicemail: yes     Reason for Call: Appointment Intake    Referring Provider Name:  Oni Callaway MD in [facility] NEUROLOGY  Diagnosis and/or Symptoms: Stenosis of right carotid artery [I65.21]  Ac isch multi vasc territories stroke (H) [I63.89]  History of ischemic stroke without residual deficits [Z86.73]  Blurred vision [H53.8], Pt with R ICA occlusion with progressive tunneled vision since July getting worse.    Arms worker May calling to schedule from referral. Multiple diagnosis writer unsure who to schedule with.    Please review medical records and call May back at [phone] to schedule with appropriate provider. Thank you.    Action Taken: Message routed to:  [facility]    Travel Screening: Not Applicable     Date of Service:

## 2024-11-06 ENCOUNTER — ORDERS ONLY (AUTO-RELEASED) (OUTPATIENT)
Dept: NEUROLOGY | Facility: CLINIC | Age: 39
End: 2024-11-06
Payer: MEDICARE

## 2024-11-06 DIAGNOSIS — I65.21 STENOSIS OF RIGHT CAROTID ARTERY: ICD-10-CM

## 2024-11-06 DIAGNOSIS — I63.89 AC ISCH MULTI VASC TERRITORIES STROKE (H): ICD-10-CM

## 2024-11-06 DIAGNOSIS — Z86.73 HISTORY OF ISCHEMIC STROKE WITHOUT RESIDUAL DEFICITS: Chronic | ICD-10-CM

## 2024-11-07 NOTE — TELEPHONE ENCOUNTER
Spoke to ARMS worker    Pt with peripheral vision loss following stroke/since around July in each eye.    Dizziness complaints.    Scheduled December 10th with Dr. Swift in 60 minute Low vision time slot.    ARMS worker aware of date/time/location at [facility]/main clinic number/ambassadors at [facility].    Mauro Alvarado RN 10:39 AM 11/07/24

## 2024-11-20 ENCOUNTER — TELEPHONE (OUTPATIENT)
Dept: NEUROLOGY | Facility: CLINIC | Age: 39
End: 2024-11-20
Payer: MEDICARE

## 2024-11-20 ENCOUNTER — APPOINTMENT (OUTPATIENT)
Dept: INTERPRETER SERVICES | Facility: CLINIC | Age: 39
End: 2024-11-20
Payer: MEDICARE

## 2024-11-20 NOTE — TELEPHONE ENCOUNTER
Patient confirmed scheduled appointment:  Date: 2/26/2025  Time: 9:30 am  Visit type: Return Stroke  Provider: [place]  Location: [facility]  Testing/imaging: NA  Additional notes: Follow up    Yoli Angeles on 11/20/2024 at 12:33 PM

## 2024-11-21 ENCOUNTER — HOSPITAL ENCOUNTER (OUTPATIENT)
Dept: CARDIOLOGY | Facility: HOSPITAL | Age: 39
End: 2024-11-21
Attending: PSYCHIATRY & NEUROLOGY | Admitting: INTERNAL MEDICINE
Payer: MEDICARE

## 2024-11-21 VITALS
TEMPERATURE: 98.5 F | HEART RATE: 60 BPM | OXYGEN SATURATION: 99 % | DIASTOLIC BLOOD PRESSURE: 65 MMHG | SYSTOLIC BLOOD PRESSURE: 105 MMHG | RESPIRATION RATE: 18 BRPM

## 2024-11-21 PROCEDURE — 93320 DOPPLER ECHO COMPLETE: CPT

## 2024-11-21 PROCEDURE — 250N000009 HC RX 250: Performed by: INTERNAL MEDICINE

## 2024-11-21 PROCEDURE — 99152 MOD SED SAME PHYS/QHP 5/>YRS: CPT | Performed by: INTERNAL MEDICINE

## 2024-11-21 PROCEDURE — 250N000011 HC RX IP 250 OP 636: Performed by: INTERNAL MEDICINE

## 2024-11-21 PROCEDURE — 99153 MOD SED SAME PHYS/QHP EA: CPT | Performed by: INTERNAL MEDICINE

## 2024-11-21 PROCEDURE — 93325 DOPPLER ECHO COLOR FLOW MAPG: CPT

## 2024-11-21 RX ORDER — MAGNESIUM HYDROXIDE/ALUMINUM HYDROXICE/SIMETHICONE 120; 1200; 1200 MG/30ML; MG/30ML; MG/30ML
30 SUSPENSION ORAL EVERY 8 HOURS PRN
Status: ACTIVE | OUTPATIENT
Start: 2024-11-21 | End: 2024-11-23

## 2024-11-21 RX ORDER — ACETAMINOPHEN 325 MG/1
650 TABLET ORAL EVERY 4 HOURS PRN
Status: ACTIVE | OUTPATIENT
Start: 2024-11-21 | End: 2024-11-23

## 2024-11-21 RX ORDER — FENTANYL CITRATE 50 UG/ML
INJECTION, SOLUTION INTRAMUSCULAR; INTRAVENOUS
Status: COMPLETED | OUTPATIENT
Start: 2024-11-21 | End: 2024-11-21

## 2024-11-21 RX ORDER — BENZOCAINE/MENTHOL 6 MG-10 MG
1 LOZENGE MUCOUS MEMBRANE 3 TIMES DAILY PRN
Status: ACTIVE | OUTPATIENT
Start: 2024-11-21 | End: 2024-11-23

## 2024-11-21 RX ORDER — LIDOCAINE HYDROCHLORIDE 20 MG/ML
SOLUTION OROPHARYNGEAL
Status: COMPLETED | OUTPATIENT
Start: 2024-11-21 | End: 2024-11-21

## 2024-11-21 RX ADMIN — FENTANYL CITRATE 75 MCG: 50 INJECTION, SOLUTION INTRAMUSCULAR; INTRAVENOUS at 08:23

## 2024-11-21 RX ADMIN — MIDAZOLAM HYDROCHLORIDE 1 MG: 1 INJECTION, SOLUTION INTRAMUSCULAR; INTRAVENOUS at 08:23

## 2024-11-21 RX ADMIN — FENTANYL CITRATE 25 MCG: 50 INJECTION, SOLUTION INTRAMUSCULAR; INTRAVENOUS at 08:26

## 2024-11-21 RX ADMIN — LIDOCAINE HYDROCHLORIDE 15 ML: 20 SOLUTION ORAL; TOPICAL at 08:18

## 2024-11-21 RX ADMIN — TOPICAL ANESTHETIC 0.5 ML: 200 SPRAY DENTAL; PERIODONTAL at 08:19

## 2024-11-21 RX ADMIN — MIDAZOLAM HYDROCHLORIDE 1 MG: 1 INJECTION, SOLUTION INTRAMUSCULAR; INTRAVENOUS at 08:26

## 2024-11-21 ASSESSMENT — ACTIVITIES OF DAILY LIVING (ADL)
ADLS_ACUITY_SCORE: 0

## 2024-11-21 NOTE — INTERVAL H&P NOTE
I have reviewed the surgical (or preoperative) H&P that is linked to this encounter, and examined the patient. There are no significant changes    Clinical Conditions Present on Arrival:  Clinically Significant Risk Factors Present on Admission                  # Drug Induced Platelet Defect: home medication list includes an antiplatelet medication      # DMII: A1C = N/A within past 6 months

## 2024-11-21 NOTE — DISCHARGE INSTRUCTIONS
1. You are required to have someone accompany you home.    2. Rest today. Do not drive or operate machinery today. Over-activity may produce nausea and dizziness.    3. You should follow your normal diet. Drink plenty of fluids. Do not drink any alcoholic beverages for 24 hours. *(Alcohol may interact with the medications you received today).    4. NO HOT FOODS or LIQUIDS FOR 6 HOURS after the procedure, until 2:30pm today 11-21-24.    5. You may have a sore throat or cough. This is normal. These symptoms should resolve in 24 hours.     6. If you have further questions call your doctor: Dr. Callaway.

## 2024-11-21 NOTE — PRE-PROCEDURE
GENERAL PRE-PROCEDURE:     Verbal consent obtained?: Yes    Risks and benefits: Risks, benefits and alternatives were discussed    Consent given by:  Patient  Patient states understanding of procedure being performed: Yes    Patient's understanding of procedure matches consent: Yes    Procedure consent matches procedure scheduled: Yes    Expected level of sedation:  Moderate  Appropriately NPO:  Yes  Mallampati  :  Grade 3- soft palate visible, posterior pharyngeal wall not visible  Lungs:  Lungs clear with good breath sounds bilaterally  Heart:  Normal heart sounds and rate  History & Physical reviewed:  History and physical reviewed and no updates needed  Statement of review:  I have reviewed the lab findings, diagnostic data, medications, and the plan for sedation

## 2024-11-25 ASSESSMENT — ACTIVITIES OF DAILY LIVING (ADL)
ADLS_ACUITY_SCORE: 57
ADLS_ACUITY_SCORE: 0
ADLS_ACUITY_SCORE: 57
ADLS_ACUITY_SCORE: 0
ADLS_ACUITY_SCORE: 57
ADLS_ACUITY_SCORE: 0
ADLS_ACUITY_SCORE: 0
ADLS_ACUITY_SCORE: 57
ADLS_ACUITY_SCORE: 0
ADLS_ACUITY_SCORE: 57
ADLS_ACUITY_SCORE: 0
ADLS_ACUITY_SCORE: 57
ADLS_ACUITY_SCORE: 57
ADLS_ACUITY_SCORE: 0
ADLS_ACUITY_SCORE: 57

## 2024-11-26 ASSESSMENT — ACTIVITIES OF DAILY LIVING (ADL)
ADLS_ACUITY_SCORE: 57

## 2024-11-27 ASSESSMENT — ACTIVITIES OF DAILY LIVING (ADL)
ADLS_ACUITY_SCORE: 57

## 2024-11-28 ASSESSMENT — ACTIVITIES OF DAILY LIVING (ADL)
ADLS_ACUITY_SCORE: 57

## 2024-11-29 ASSESSMENT — ACTIVITIES OF DAILY LIVING (ADL)
ADLS_ACUITY_SCORE: 57

## 2024-11-30 ASSESSMENT — ACTIVITIES OF DAILY LIVING (ADL)
ADLS_ACUITY_SCORE: 57

## 2024-12-09 ENCOUNTER — TELEPHONE (OUTPATIENT)
Dept: NEUROLOGY | Facility: CLINIC | Age: 39
End: 2024-12-09
Payer: MEDICARE

## 2024-12-09 NOTE — TELEPHONE ENCOUNTER
Called patient LVM asking patient to call us back regarding ziopatch. Left our clinic number to call us back regarding ziopatch being returned?.

## 2024-12-10 ENCOUNTER — OFFICE VISIT (OUTPATIENT)
Dept: OPTOMETRY | Facility: CLINIC | Age: 39
End: 2024-12-10
Payer: MEDICARE

## 2024-12-10 DIAGNOSIS — H52.13 MYOPIA OF BOTH EYES: ICD-10-CM

## 2024-12-10 DIAGNOSIS — Z86.73 HISTORY OF STROKE: Primary | ICD-10-CM

## 2024-12-10 ASSESSMENT — CONF VISUAL FIELD
METHOD: COUNTING FINGERS
OD_SUPERIOR_NASAL_RESTRICTION: 1
OS_SUPERIOR_NASAL_RESTRICTION: 1
OS_SUPERIOR_TEMPORAL_RESTRICTION: 1

## 2024-12-10 ASSESSMENT — TONOMETRY
OD_IOP_MMHG: 12
IOP_METHOD: ICARE
OS_IOP_MMHG: 15

## 2024-12-10 ASSESSMENT — VISUAL ACUITY
OS_CC: 20/30+
CORRECTION_TYPE: GLASSES
OD_CC: 20/25
METHOD: SNELLEN - LINEAR

## 2024-12-10 ASSESSMENT — REFRACTION_MANIFEST
OD_SPHERE: -1.50
OS_CYLINDER: SPHERE
OS_SPHERE: -1.75
OD_CYLINDER: SPHERE

## 2024-12-10 ASSESSMENT — REFRACTION_WEARINGRX
OS_SPHERE: -0.75
OS_CYLINDER: SPHERE
OD_CYLINDER: SPHERE
OD_SPHERE: -0.75

## 2024-12-11 ASSESSMENT — EXTERNAL EXAM - LEFT EYE: OS_EXAM: NORMAL

## 2024-12-11 ASSESSMENT — SLIT LAMP EXAM - LIDS
COMMENTS: NORMAL
COMMENTS: NORMAL

## 2024-12-11 ASSESSMENT — EXTERNAL EXAM - RIGHT EYE: OD_EXAM: NORMAL

## 2024-12-11 NOTE — PROGRESS NOTES
Assessment/Plan  (Z86.73) History of stroke  (primary encounter diagnosis)  Comment: Secondary to stenosis of right carotid artery. Patient is currently managed by neurology. With timing of symptoms and otherwise excellent best corrected visual acuity, suspect that this is the source of his blurred vision.   Plan: Glaucoma Top OU        Discussed findings with patient. Recommend repeating visual field exam (DMV field OU) in about 2 months to monitor for any improvements. Patient is ok to continue using glasses as needed but was advised to not drive at this time. If defect retreats away from the horizontal midline he could be approved for driving again.    (H52.13) Myopia of both eyes  Plan: Ok to continue with glasses for now, as refractive error is an unlikely source of his blurred vision and/or dizziness.           30 minutes were spent on the date of the encounter doing chart review, history and exam, documentation, and further activities as noted above.    Complete documentation of historical and exam elements from today's encounter can  be found in the full encounter summary report (not reduplicated in this progress  note). I personally obtained the chief complaint(s) and history of present illness. I  confirmed and edited as necessary the review of systems, past medical/surgical  history, family history, social history, and examination findings as documented by  others; and I examined the patient myself. I personally reviewed the relevant tests,  images, and reports as documented above. I formulated and edited as necessary the  assessment and plan and discussed the findings and management plan with the  patient and family.    Genaro Swift, NIR

## 2024-12-12 LAB — CV ZIO PRELIM RESULTS: NORMAL

## 2024-12-18 ENCOUNTER — DOCUMENTATION ONLY (OUTPATIENT)
Dept: NEUROLOGY | Facility: CLINIC | Age: 39
End: 2024-12-18
Payer: MEDICARE

## 2025-02-26 ENCOUNTER — LAB (OUTPATIENT)
Dept: LAB | Facility: CLINIC | Age: 40
End: 2025-02-26
Payer: MEDICARE

## 2025-02-26 ENCOUNTER — OFFICE VISIT (OUTPATIENT)
Dept: NEUROLOGY | Facility: CLINIC | Age: 40
End: 2025-02-26
Payer: MEDICARE

## 2025-02-26 VITALS
SYSTOLIC BLOOD PRESSURE: 145 MMHG | RESPIRATION RATE: 16 BRPM | OXYGEN SATURATION: 100 % | HEART RATE: 61 BPM | DIASTOLIC BLOOD PRESSURE: 90 MMHG | BODY MASS INDEX: 25.81 KG/M2 | WEIGHT: 141.1 LBS

## 2025-02-26 DIAGNOSIS — Z86.73 HISTORY OF ISCHEMIC STROKE WITHOUT RESIDUAL DEFICITS: Chronic | ICD-10-CM

## 2025-02-26 DIAGNOSIS — I63.89 AC ISCH MULTI VASC TERRITORIES STROKE (H): ICD-10-CM

## 2025-02-26 DIAGNOSIS — E78.2 MIXED HYPERLIPIDEMIA: Primary | ICD-10-CM

## 2025-02-26 DIAGNOSIS — I69.354 HEMIPARESIS AFFECTING LEFT SIDE AS LATE EFFECT OF CEREBROVASCULAR ACCIDENT (CVA) (H): ICD-10-CM

## 2025-02-26 DIAGNOSIS — I65.21 STENOSIS OF RIGHT CAROTID ARTERY: ICD-10-CM

## 2025-02-26 PROCEDURE — 86038 ANTINUCLEAR ANTIBODIES: CPT | Performed by: PSYCHIATRY & NEUROLOGY

## 2025-02-26 PROCEDURE — 99000 SPECIMEN HANDLING OFFICE-LAB: CPT | Performed by: PATHOLOGY

## 2025-02-26 PROCEDURE — 36415 COLL VENOUS BLD VENIPUNCTURE: CPT | Performed by: PATHOLOGY

## 2025-02-26 RX ORDER — EZETIMIBE 10 MG/1
10 TABLET ORAL DAILY
Qty: 30 TABLET | Refills: 3 | Status: SHIPPED | OUTPATIENT
Start: 2025-02-26

## 2025-02-26 RX ORDER — ASPIRIN 325 MG
325 TABLET, DELAYED RELEASE (ENTERIC COATED) ORAL DAILY
Qty: 30 TABLET | Refills: 3 | Status: SHIPPED | OUTPATIENT
Start: 2025-02-26

## 2025-02-26 RX ORDER — THERMOMETER, ELECTRONIC,ORAL
EACH MISCELLANEOUS 2 TIMES DAILY
COMMUNITY

## 2025-02-26 ASSESSMENT — PAIN SCALES - GENERAL: PAINLEVEL_OUTOF10: NO PAIN (0)

## 2025-02-26 NOTE — LETTER
2/26/2025       RE: Sanchez Muñoz  [address] [facility] [address]  [place] [address]     Dear Colleague,    Thank you for referring your patient, Sanchez Muñoz, to the [facility] [place] at [facility]. Please see a copy of my visit note below.      I spent a total of 44 minutes on the day of the visit.   Time spent by me doing chart review, history and exam, documentation and further activities per the note        _____________________________________________________________    [facility]    at [facility]  _____________________________________________________________      Chief Complaint: Patient presents with:  RECHECK: Return Stroke         History of Present Illness: Sancehz Muñoz is a 39 year old male presenting for stroke follow up. He is accompanied with care coordination who helps with interpretation.     39 year old male with past medical hx of DM HTN Hld know R ICA stenosis presented to [facility] on 7/9/2024 with headaches and progressive dizziness for 1 week. Work up showed MRI with acute infarct in R caudate , MEDHAT , right hippocampus and R temporal lobe. CTA showed R ICA high grade stenosis with moderate narrowing of R m1 and multifocal narrowing of IZABELLA. Further work up showed echo with preserved EF and no cardioembolic sources. According to the patient since discharge his vision is getting worse. He notices blurred vision and tunneled vision. The tunneling of vision has worsened since then. To given an example pt while watching TV is unable to see subtitles on the screen. He reports that vision change improves when lying down. They are there constantly. They are not better or worse on any particular time of the day. He mentions that reading bright signal may be difficult for him. He also reports headache which is stable since July however was new in July. Headache is  episodic. Episodes are short and brief and he describes headache as sharp pain on bitemporal region and radiating to the R side of the neck . He denies any vertiginous symptoms.     Pt has a hx of stroke in 2013 and has residual L sided weakness from it. On review of records it appears pt had DSA and MRI in 2013 but reports are not in the system. Personal review shows R terminal ICA occlusion. Mri brain showed R IC infarct ( larger than lacunar stroke) likely anterior choroidal artery infarct.     MRI 10/8/2014  HEAD MRI:  1.  Interval evolutionary changes of now chronic right posterior limb internal capsule and corona radiata infarcts with encephalomalacia and gliosis. No new restricted diffusion, space occupying hemorrhage, or intracranial mass.     2.  Small amount of presumed chronic hemosiderin deposition within the right internal capsule chronic infarct.     HEAD MRA:  1.  There has been progression of stenosis involving the distal right internal carotid artery which is diffusely small in caliber intracranially and tapers to critical stenosis or near-occlusion at its junction with the right M1 segment. The right MCA   branches do appear to opacify symmetrically and normally when compared to the left on this examination.     2.  Remainder of the MRA is normal and unchanged. 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     NECK MRA:  1.  Development of mild diffuse narrowing of the right ICA throughout its cervical course with more focal moderate narrowing of the proximal right ICA of 50-60%. These findings are indeterminate though could be related to interval dissection since   08/30/2013. The vessel remains patent. CTA or conventional angiography may be helpful for clarification.     8/26/2017    IMPRESSION:  CONCLUSION:  HEAD MRA:  1.  Redemonstration of marked narrowing at the junction of  right ICA terminus and the right M1. Diminished caliber of visualized right ICA, similar to prior.   2.  Asymmetry in the appearance of MCA branches with less numerous and less opacified branches on the right, similar to prior.   3.  Diminished opacification of A2 segments and its branches bilaterally, new since prior. It is due to combination of above described right ICA narrowing and either hypoplasia or narrowing of left A1.  There may be narrowing at the left ICA terminus,   better appreciated on prior exams.     NECK MRA:  1.  Redemonstration of diffuse narrowing of the right ICA throughout the neck, most prominent in its proximal aspect.   2.  Narrowing proximally is slightly progressed since the prior, measured at approximately 70% just distal to the bifurcation compared with 60-70% on the prior.  3.  Normal left ICA and both vertebral arteries.        Stroke Evaluation Summarized     MRI/Head CT MRI Right caudate body, right internal capsule posterior limb, right hippocampus, right temporal stem, right temporal lobe white matter demonstrate multiple small acute infarcts.       Intracranial Vasculature NECK CTA:  1.  High-grade critical narrowing of the right internal carotid artery starting at the origin to the skull base, worsened since previous MRA 08/26/2017.  2.  No hemodynamically significant narrowing of the left internal carotid artery based on the NASCET criteria.  3.  The vertebral arteries are patent throughout their course in the neck.     Cervical Vasculature HEAD CTA:  1.  High-grade narrowing of the distal extracranial internal carotid artery, worsened since prior head CTA 08/24/2013 and MRA 08/26/2017.  2.  Redemonstration of moderate narrowing of the M1 segment of the right middle cerebral artery, similar to the prior exam.  3.  Multifocal moderate narrowing of the A2 and A3 segments of the anterior cerebral arteries. Mild to moderate narrowing of the supraclinoid left internal carotid  artery.      Echocardiogram TTE:Left ventricular size, wall motion and function are normal. The ejection  fraction is 60-65%.  Normal right ventricle size and systolic function.  A contrast injection (Bubble Study) was performed that was negative for flow  across the interatrial septum.  No hemodynamically significant valvular abnormalities on 2D or color flow  imaging.    KRISSY: Negative   EKG/Telemetry NSR   Hypercoagulable work  NA   Vasculitis work up NA   CSF analysis NA      LDL  142----> 119       A1C  8.1---->6.2          DSA 09/05/2024 Impression:  Right internal carotid artery occlusion at the level of the  bifurcation with compensatory leptomeningeal anastomoses between the  posterior cerebral artery and middle cerebral artery. As well as  external carotid artery to internal carotid artery anastomoses via the  ophthalmic artery and the middle meningeal artery.     Pt is doing well. Denies any new stroke or TIA like symptoms. He is compliant with medication without any side effects. 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            Impression:   Problem List Items Addressed This Visit          Endocrine    Mixed hyperlipidemia - Primary    Relevant Medications    ezetimibe (ZETIA) 10 MG tablet     Other Visit Diagnoses       Hemiparesis affecting left side as late effect of cerebrovascular accident (CVA) (H)        Relevant Medications    aspirin (ASA) 325 MG EC tablet            39 year old male with past medical hx of HTN HLD DM and known R ICA stenosis and ischemic stroke presents for follow up. Review of previous imaging shows R ICA terminus occlusion and slow progressive stenosis of proximal ICA. Recent CTA shows R ICA occlusion which was confirmed on DSA as well. Pt has had two strokes both appears to be in R sub cortical areas likely choroidal artery territory. Talked to pt about treatment options. Since artery is completely occluded so pt is  "not a candidate for stenting . Other potential option is bypass. Will need to continue with best risk factor management and control and evaluate for other potential etiologies and rule out cardiac reasons for stroke in young.    Mrs 2    Plan:   - Stop aspirin 81 mg and Plavix 75 mg. Start Aspirin 325 mg daily. Long term use of DAPT will put pt at a higher risk of bleeding complication without providing any additional benefit compared to single antiplatelet agent.  - High intensity statin. LDL not controlled.   - Start Zetia in  addition to Lipitor 80 mg to target LDL goal <70  - Recent Hba1c down to 6.2  - HELIO pending  - requested pt to  monitor BP about 3 times a week and send me log via [employer].  -Pt asked questions about By pass surgery. I reiterated Bypass surgery is an option however given no recurrence of events on medical management we should hold off on by pass right now. In case if there is a breakthrough event despite medication then we may consider By pass surgery.   - Pt wondered that his vision complaints and dizziness are bothersome. I informed him that these are likely related to his stroke and by pass surgery is not going to help these symptoms instead it is there for stroke prevention.  - Follow up in 6 months    Stroke Education provided.  He will call us with any questions.  For any acute neurologic deficits he was advised to  go directly to the hospital rather than call the clinic.    Oni Callaway MD  Neurology  02/26/2025 11:25 AM  To page me or covering stroke neurology team member, click here: AMCOM  Choose \"On Call\" tab at top, then search dropdown box for \"Neurology Adult\" & press Enter, look for Neuro ICU/Stroke    ___________________________________________________________________    Current Medications  Current Outpatient Medications   Medication Sig Dispense Refill     aspirin (ASA) 325 MG EC tablet Take 1 tablet (325 mg) by mouth daily. 30 tablet 3     atorvastatin (LIPITOR) 80 MG " tablet Take 80 mg by mouth every morning.       dulaglutide (TRULICITY) 1.5 mg/0.5 mL PnIj [DULAGLUTIDE (TRULICITY) 1.5 MG/0.5 ML PNIJ] Inject 1.5 mg under the skin every 7 days. For blood sugars 6 mL 6     ezetimibe (ZETIA) 10 MG tablet Take 1 tablet (10 mg) by mouth daily. 30 tablet 3     insulin glargine (LANTUS PEN) 100 UNIT/ML pen Inject 25 Units subcutaneously.       lisinopril (ZESTRIL) 20 MG tablet Take 20 mg by mouth every morning.       metFORMIN (GLUCOPHAGE) 500 MG tablet Take 500 mg by mouth.       CLOTRIMAZOLE ANTI-FUNGAL 1 % external cream Apply topically 2 times daily.       No current facility-administered medications for this visit.       Past Medical History  Past Medical History:   Diagnosis Date     Anxiety      Cerebrovascular accident (CVA) involving anterior circulation of right side (H)     hemorrhagic due to right ica dissection per clinic notes     Hypertension      Hypertriglyceridemia      Lumbar radiculopathy      Tinea versicolor      Uncontrolled diabetes mellitus     on insulin     Vitamin D deficiency        Social History  Social History     Tobacco Use     Smoking status: Never     Smokeless tobacco: Never   Substance Use Topics     Alcohol use: No     Drug use: No       Family History  Family History   Problem Relation Age of Onset     No Known Problems Mother      No Known Problems Father      No Known Problems Sister      No Known Problems Brother      No Known Problems Maternal Grandmother      No Known Problems Maternal Grandfather      No Known Problems Paternal Grandmother      No Known Problems Paternal Grandfather        ROS: 10 point relevant ROS neg other than the symptoms noted above in the HPI.    Physical Exam    BP (!) 145/90 (BP Location: Left arm, Patient Position: Sitting, Cuff Size: Adult Regular)   Pulse 61   Resp 16   Wt 64 kg (141 lb 1.6 oz)   SpO2 100%   BMI 25.81 kg/m      General:  no acute distress  HEENT:  normocephalic/atraumatic  Pulmonary:  no  respiratory distress    Neurologic  Mental Status:  alert, oriented x 3, follows commands, speech clear and fluent, naming and repetition normal  Cranial Nerves:  EOMI with normal smooth pursuit, L facial weakness, hearing not formally tested but intact to conversation, no dysarthria, shoulder shrug equal bilaterally, tongue protrusion midline  Motor:  mild L arm and leg weakness  Reflexes:  Intact  Sensory:  Intact to light touch in bilateral upper and bilateral lower extremities  Coordination:  normal finger-to-nose and heel-to-shin bilaterally without dysmetria, rapid alternating movements symmetric  Station/Gait:  Normal    Neuroimaging: as per HPI. I    Labs:    Recent Labs   Lab Test 09/05/24  0645   INR 1.01        Recent Labs   Lab Test 07/08/24  1733 09/23/20  1411 07/29/19  0934   CHOL 226* 319* 263*   HDL 34* 39* 36*   LDL  --  142* 142*   TRIG 407* 916* 461*       Recent Labs   Lab Test 07/08/24  1733 04/12/21  1753 12/08/20  0919   A1C 8.1* 8.1* 7.2*       No lab results found.      Again, thank you for allowing me to participate in the care of your patient.      Sincerely,    Oni Callaway MD

## 2025-02-26 NOTE — NURSING NOTE
Chief Complaint   Patient presents with    RECHECK     Return Stroke        BP (!) 145/90 (BP Location: Left arm, Patient Position: Sitting, Cuff Size: Adult Regular)   Pulse 61   Resp 16   Wt 64 kg (141 lb 1.6 oz)   SpO2 100%   BMI 25.81 kg/m    Grazyna Moore

## 2025-02-26 NOTE — PATIENT INSTRUCTIONS
Follow-up with Dr. Callaway in 6 months    Stroke & Endovascular RN Care Coordinators:    Silvia Salgado, RN, BSN  Saundra Wade, RN, CNRN, SCRN    If you have any questions please contact the RN Care Coordinators at [phone], option 1.     Thank you for choosing [facility] for your health care needs.

## 2025-02-26 NOTE — PROGRESS NOTES
I spent a total of 44 minutes on the day of the visit.   Time spent by me doing chart review, history and exam, documentation and further activities per the note        _____________________________________________________________    [facility]    at [facility]  _____________________________________________________________      Chief Complaint: Patient presents with:  RECHECK: Return Stroke         History of Present Illness: Sanchez Muñoz is a 39 year old male presenting for stroke follow up. He is accompanied with care coordination who helps with interpretation.     39 year old male with past medical hx of DM HTN Hld know R ICA stenosis presented to [place] ED on 7/9/2024 with headaches and progressive dizziness for 1 week. Work up showed MRI with acute infarct in R caudate , MEDHAT , right hippocampus and R temporal lobe. CTA showed R ICA high grade stenosis with moderate narrowing of R m1 and multifocal narrowing of IZABELLA. Further work up showed echo with preserved EF and no cardioembolic sources. According to the patient since discharge his vision is getting worse. He notices blurred vision and tunneled vision. The tunneling of vision has worsened since then. To given an example pt while watching TV is unable to see subtitles on the screen. He reports that vision change improves when lying down. They are there constantly. They are not better or worse on any particular time of the day. He mentions that reading bright signal may be difficult for him. He also reports headache which is stable since July however was new in July. Headache is episodic. Episodes are short and brief and he describes headache as sharp pain on bitemporal region and radiating to the R side of the neck . He denies any vertiginous symptoms.     Pt has a hx of stroke in 2013 and has residual L sided weakness from it. On review of records it appears pt had DSA and MRI in  2013 but reports are not in the system. Personal review shows R terminal ICA occlusion. Mri brain showed R IC infarct ( larger than lacunar stroke) likely anterior choroidal artery infarct.     MRI 10/8/2014  HEAD MRI:  1.  Interval evolutionary changes of now chronic right posterior limb internal capsule and corona radiata infarcts with encephalomalacia and gliosis. No new restricted diffusion, space occupying hemorrhage, or intracranial mass.     2.  Small amount of presumed chronic hemosiderin deposition within the right internal capsule chronic infarct.     HEAD MRA:  1.  There has been progression of stenosis involving the distal right internal carotid artery which is diffusely small in caliber intracranially and tapers to critical stenosis or near-occlusion at its junction with the right M1 segment. The right MCA   branches do appear to opacify symmetrically and normally when compared to the left on this examination.     2.  Remainder of the MRA is normal and unchanged. 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     NECK MRA:  1.  Development of mild diffuse narrowing of the right ICA throughout its cervical course with more focal moderate narrowing of the proximal right ICA of 50-60%. These findings are indeterminate though could be related to interval dissection since   08/30/2013. The vessel remains patent. CTA or conventional angiography may be helpful for clarification.     8/26/2017    IMPRESSION:  CONCLUSION:  HEAD MRA:  1.  Redemonstration of marked narrowing at the junction of right ICA terminus and the right M1. Diminished caliber of visualized right ICA, similar to prior.   2.  Asymmetry in the appearance of MCA branches with less numerous and less opacified branches on the right, similar to prior.   3.  Diminished opacification of A2 segments and its branches bilaterally, new  since prior. It is due to combination of above described right ICA narrowing and either hypoplasia or narrowing of left A1.  There may be narrowing at the left ICA terminus,   better appreciated on prior exams.     NECK MRA:  1.  Redemonstration of diffuse narrowing of the right ICA throughout the neck, most prominent in its proximal aspect.   2.  Narrowing proximally is slightly progressed since the prior, measured at approximately 70% just distal to the bifurcation compared with 60-70% on the prior.  3.  Normal left ICA and both vertebral arteries.        Stroke Evaluation Summarized     MRI/Head CT MRI Right caudate body, right internal capsule posterior limb, right hippocampus, right temporal stem, right temporal lobe white matter demonstrate multiple small acute infarcts.       Intracranial Vasculature NECK CTA:  1.  High-grade critical narrowing of the right internal carotid artery starting at the origin to the skull base, worsened since previous MRA 08/26/2017.  2.  No hemodynamically significant narrowing of the left internal carotid artery based on the NASCET criteria.  3.  The vertebral arteries are patent throughout their course in the neck.     Cervical Vasculature HEAD CTA:  1.  High-grade narrowing of the distal extracranial internal carotid artery, worsened since prior head CTA 08/24/2013 and MRA 08/26/2017.  2.  Redemonstration of moderate narrowing of the M1 segment of the right middle cerebral artery, similar to the prior exam.  3.  Multifocal moderate narrowing of the A2 and A3 segments of the anterior cerebral arteries. Mild to moderate narrowing of the supraclinoid left internal carotid artery.      Echocardiogram TTE:Left ventricular size, wall motion and function are normal. The ejection  fraction is 60-65%.  Normal right ventricle size and systolic function.  A contrast injection (Bubble Study) was performed that was negative for flow  across the interatrial septum.  No hemodynamically  significant valvular abnormalities on 2D or color flow  imaging.    KRISSY: Negative   EKG/Telemetry NSR   Hypercoagulable work  NA   Vasculitis work up NA   CSF analysis NA      LDL  142----> 119       A1C  8.1---->6.2          DSA 09/05/2024 Impression:  Right internal carotid artery occlusion at the level of the  bifurcation with compensatory leptomeningeal anastomoses between the  posterior cerebral artery and middle cerebral artery. As well as  external carotid artery to internal carotid artery anastomoses via the  ophthalmic artery and the middle meningeal artery.     Pt is doing well. Denies any new stroke or TIA like symptoms. He is compliant with medication without any side effects. 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            Impression:   Problem List Items Addressed This Visit          Endocrine    Mixed hyperlipidemia - Primary    Relevant Medications    ezetimibe (ZETIA) 10 MG tablet     Other Visit Diagnoses       Hemiparesis affecting left side as late effect of cerebrovascular accident (CVA) (H)        Relevant Medications    aspirin (ASA) 325 MG EC tablet            39 year old male with past medical hx of HTN HLD DM and known R ICA stenosis and ischemic stroke presents for follow up. Review of previous imaging shows R ICA terminus occlusion and slow progressive stenosis of proximal ICA. Recent CTA shows R ICA occlusion which was confirmed on DSA as well. Pt has had two strokes both appears to be in R sub cortical areas likely choroidal artery territory. Talked to pt about treatment options. Since artery is completely occluded so pt is not a candidate for stenting . Other potential option is bypass. Will need to continue with best risk factor management and control and evaluate for other potential etiologies and rule out cardiac reasons for stroke in young.    Mrs 2    Plan:   - Stop aspirin 81 mg and Plavix 75 mg. Start Aspirin 325 mg  "daily. Long term use of DAPT will put pt at a higher risk of bleeding complication without providing any additional benefit compared to single antiplatelet agent.  - High intensity statin. LDL not controlled.   - Start Zetia in  addition to Lipitor 80 mg to target LDL goal <70  - Recent Hba1c down to 6.2  - HELIO pending  - requested pt to  monitor BP about 3 times a week and send me log via [employer].  -Pt asked questions about By pass surgery. I reiterated Bypass surgery is an option however given no recurrence of events on medical management we should hold off on by pass right now. In case if there is a breakthrough event despite medication then we may consider By pass surgery.   - Pt wondered that his vision complaints and dizziness are bothersome. I informed him that these are likely related to his stroke and by pass surgery is not going to help these symptoms instead it is there for stroke prevention.  - Follow up in 6 months    Stroke Education provided.  He will call us with any questions.  For any acute neurologic deficits he was advised to  go directly to the hospital rather than call the clinic.    Oni Callaway MD  Neurology  02/26/2025 11:25 AM  To page me or covering stroke neurology team member, click here: AMCOM  Choose \"On Call\" tab at top, then search dropdown box for \"Neurology Adult\" & press Enter, look for Neuro ICU/Stroke    ___________________________________________________________________    Current Medications  Current Outpatient Medications   Medication Sig Dispense Refill    aspirin (ASA) 325 MG EC tablet Take 1 tablet (325 mg) by mouth daily. 30 tablet 3    atorvastatin (LIPITOR) 80 MG tablet Take 80 mg by mouth every morning.      dulaglutide (TRULICITY) 1.5 mg/0.5 mL PnIj [DULAGLUTIDE (TRULICITY) 1.5 MG/0.5 ML PNIJ] Inject 1.5 mg under the skin every 7 days. For blood sugars 6 mL 6    ezetimibe (ZETIA) 10 MG tablet Take 1 tablet (10 mg) by mouth daily. 30 tablet 3    insulin glargine " (LANTUS PEN) 100 UNIT/ML pen Inject 25 Units subcutaneously.      lisinopril (ZESTRIL) 20 MG tablet Take 20 mg by mouth every morning.      metFORMIN (GLUCOPHAGE) 500 MG tablet Take 500 mg by mouth.      CLOTRIMAZOLE ANTI-FUNGAL 1 % external cream Apply topically 2 times daily.       No current facility-administered medications for this visit.       Past Medical History  Past Medical History:   Diagnosis Date    Anxiety     Cerebrovascular accident (CVA) involving anterior circulation of right side (H)     hemorrhagic due to right ica dissection per clinic notes    Hypertension     Hypertriglyceridemia     Lumbar radiculopathy     Tinea versicolor     Uncontrolled diabetes mellitus     on insulin    Vitamin D deficiency        Social History  Social History     Tobacco Use    Smoking status: Never    Smokeless tobacco: Never   Substance Use Topics    Alcohol use: No    Drug use: No       Family History  Family History   Problem Relation Age of Onset    No Known Problems Mother     No Known Problems Father     No Known Problems Sister     No Known Problems Brother     No Known Problems Maternal Grandmother     No Known Problems Maternal Grandfather     No Known Problems Paternal Grandmother     No Known Problems Paternal Grandfather        ROS: 10 point relevant ROS neg other than the symptoms noted above in the HPI.    Physical Exam    BP (!) 145/90 (BP Location: Left arm, Patient Position: Sitting, Cuff Size: Adult Regular)   Pulse 61   Resp 16   Wt 64 kg (141 lb 1.6 oz)   SpO2 100%   BMI 25.81 kg/m      General:  no acute distress  HEENT:  normocephalic/atraumatic  Pulmonary:  no respiratory distress    Neurologic  Mental Status:  alert, oriented x 3, follows commands, speech clear and fluent, naming and repetition normal  Cranial Nerves:  EOMI with normal smooth pursuit, L facial weakness, hearing not formally tested but intact to conversation, no dysarthria, shoulder shrug equal bilaterally, tongue  protrusion midline  Motor:  mild L arm and leg weakness  Reflexes:  Intact  Sensory:  Intact to light touch in bilateral upper and bilateral lower extremities  Coordination:  normal finger-to-nose and heel-to-shin bilaterally without dysmetria, rapid alternating movements symmetric  Station/Gait:  Normal    Neuroimaging: as per HPI. I    Labs:    Recent Labs   Lab Test 09/05/24  0645   INR 1.01        Recent Labs   Lab Test 07/08/24  1733 09/23/20  1411 07/29/19  0934   CHOL 226* 319* 263*   HDL 34* 39* 36*   LDL  --  142* 142*   TRIG 407* 916* 461*       Recent Labs   Lab Test 07/08/24  1733 04/12/21  1753 12/08/20  0919   A1C 8.1* 8.1* 7.2*       No lab results found.

## 2025-02-27 LAB — ANA SER QL IF: NEGATIVE

## 2025-03-09 ENCOUNTER — HEALTH MAINTENANCE LETTER (OUTPATIENT)
Age: 40
End: 2025-03-09

## 2025-05-29 ENCOUNTER — TELEPHONE (OUTPATIENT)
Dept: NEUROLOGY | Facility: CLINIC | Age: 40
End: 2025-05-29
Payer: MEDICARE

## 2025-05-29 ENCOUNTER — TELEPHONE (OUTPATIENT)
Dept: NEUROSURGERY | Facility: CLINIC | Age: 40
End: 2025-05-29
Payer: MEDICARE

## 2025-05-29 ENCOUNTER — APPOINTMENT (OUTPATIENT)
Dept: INTERPRETER SERVICES | Facility: CLINIC | Age: 40
End: 2025-05-29
Payer: MEDICARE

## 2025-05-29 NOTE — TELEPHONE ENCOUNTER
Please cancel the pt's appointment with Dr. Callaway, per the RN's this appointment is to soon.    Please inform the patient that they will be added to the providers appointment list and will be contacted to reschedule.    Misa Aranda on 5/29/2025 at 9:03 AM

## 2025-06-25 ENCOUNTER — TELEPHONE (OUTPATIENT)
Dept: NEUROLOGY | Facility: CLINIC | Age: 40
End: 2025-06-25

## 2025-06-25 NOTE — TELEPHONE ENCOUNTER
Left Voicemail (1st Attempt) and Sent Mychart (1st Attempt) for the patient to call back and schedule the following:    Appointment type: Return Stroke  Provider: Nilton  Return date: Next Available (August)  Specialty phone number: [phone]  Additional appointment(s) needed: na  Additonal Notes:     Follow up. Rescheduling 6/25 appt as they were no show.

## 2025-08-20 ENCOUNTER — TELEPHONE (OUTPATIENT)
Dept: NEUROLOGY | Facility: CLINIC | Age: 40
End: 2025-08-20

## (undated) RX ORDER — HEPARIN SODIUM 1000 [USP'U]/ML
INJECTION, SOLUTION INTRAVENOUS; SUBCUTANEOUS
Status: DISPENSED
Start: 2024-09-05

## (undated) RX ORDER — PROTAMINE SULFATE 10 MG/ML
INJECTION, SOLUTION INTRAVENOUS
Status: DISPENSED
Start: 2024-09-05

## (undated) RX ORDER — PROPOFOL 10 MG/ML
INJECTION, EMULSION INTRAVENOUS
Status: DISPENSED
Start: 2024-09-05

## (undated) RX ORDER — ACETAMINOPHEN 325 MG/1
TABLET ORAL
Status: DISPENSED
Start: 2024-09-05

## (undated) RX ORDER — OXYCODONE HYDROCHLORIDE 5 MG/1
TABLET ORAL
Status: DISPENSED
Start: 2024-09-05

## (undated) RX ORDER — FENTANYL CITRATE 50 UG/ML
INJECTION, SOLUTION INTRAMUSCULAR; INTRAVENOUS
Status: DISPENSED
Start: 2024-09-05

## (undated) RX ORDER — FENTANYL CITRATE-0.9 % NACL/PF 10 MCG/ML
PLASTIC BAG, INJECTION (ML) INTRAVENOUS
Status: DISPENSED
Start: 2024-09-05

## (undated) RX ORDER — ESMOLOL HYDROCHLORIDE 10 MG/ML
INJECTION INTRAVENOUS
Status: DISPENSED
Start: 2024-09-05

## (undated) RX ORDER — LIDOCAINE HYDROCHLORIDE 10 MG/ML
INJECTION, SOLUTION EPIDURAL; INFILTRATION; INTRACAUDAL; PERINEURAL
Status: DISPENSED
Start: 2024-09-05

## (undated) RX ORDER — HEPARIN SODIUM 200 [USP'U]/100ML
INJECTION, SOLUTION INTRAVENOUS
Status: DISPENSED
Start: 2024-09-05

## (undated) RX ORDER — GLYCOPYRROLATE 0.2 MG/ML
INJECTION, SOLUTION INTRAMUSCULAR; INTRAVENOUS
Status: DISPENSED
Start: 2024-09-05

## (undated) RX ORDER — ONDANSETRON 2 MG/ML
INJECTION INTRAMUSCULAR; INTRAVENOUS
Status: DISPENSED
Start: 2024-09-05